# Patient Record
Sex: MALE | Race: WHITE | NOT HISPANIC OR LATINO | URBAN - METROPOLITAN AREA
[De-identification: names, ages, dates, MRNs, and addresses within clinical notes are randomized per-mention and may not be internally consistent; named-entity substitution may affect disease eponyms.]

---

## 2021-10-18 ENCOUNTER — OFFICE VISIT (OUTPATIENT)
Dept: INTERNAL MEDICINE CLINIC | Facility: CLINIC | Age: 56
End: 2021-10-18
Payer: COMMERCIAL

## 2021-10-18 VITALS
HEIGHT: 71 IN | WEIGHT: 174.8 LBS | RESPIRATION RATE: 18 BRPM | DIASTOLIC BLOOD PRESSURE: 102 MMHG | BODY MASS INDEX: 24.47 KG/M2 | HEART RATE: 53 BPM | SYSTOLIC BLOOD PRESSURE: 144 MMHG | OXYGEN SATURATION: 98 % | TEMPERATURE: 98.3 F

## 2021-10-18 DIAGNOSIS — I10 ACCELERATED HYPERTENSION: ICD-10-CM

## 2021-10-18 DIAGNOSIS — Z12.11 SCREENING FOR COLON CANCER: Primary | ICD-10-CM

## 2021-10-18 DIAGNOSIS — Z86.010 HX OF COLONOSCOPY WITH POLYPECTOMY: ICD-10-CM

## 2021-10-18 DIAGNOSIS — Z98.890 HX OF COLONOSCOPY WITH POLYPECTOMY: ICD-10-CM

## 2021-10-18 DIAGNOSIS — F17.200 SMOKING: ICD-10-CM

## 2021-10-18 PROBLEM — Z86.0100 HX OF COLONOSCOPY WITH POLYPECTOMY: Status: ACTIVE | Noted: 2021-10-18

## 2021-10-18 PROBLEM — IMO0001 SMOKING: Status: ACTIVE | Noted: 2021-10-18

## 2021-10-18 PROCEDURE — 99203 OFFICE O/P NEW LOW 30 MIN: CPT | Performed by: INTERNAL MEDICINE

## 2021-10-18 RX ORDER — AMLODIPINE AND VALSARTAN 5; 160 MG/1; MG/1
1 TABLET ORAL DAILY
Qty: 30 TABLET | Refills: 5 | Status: SHIPPED | OUTPATIENT
Start: 2021-10-18

## 2021-10-25 ENCOUNTER — OFFICE VISIT (OUTPATIENT)
Dept: INTERNAL MEDICINE CLINIC | Facility: CLINIC | Age: 56
End: 2021-10-25
Payer: COMMERCIAL

## 2021-10-25 VITALS
HEART RATE: 65 BPM | RESPIRATION RATE: 20 BRPM | BODY MASS INDEX: 24.25 KG/M2 | SYSTOLIC BLOOD PRESSURE: 130 MMHG | OXYGEN SATURATION: 96 % | WEIGHT: 173.2 LBS | HEIGHT: 71 IN | DIASTOLIC BLOOD PRESSURE: 88 MMHG | TEMPERATURE: 98.4 F

## 2021-10-25 DIAGNOSIS — I10 ESSENTIAL HYPERTENSION: Primary | ICD-10-CM

## 2021-10-25 PROCEDURE — 99213 OFFICE O/P EST LOW 20 MIN: CPT | Performed by: INTERNAL MEDICINE

## 2021-10-26 ENCOUNTER — TELEPHONE (OUTPATIENT)
Dept: ADMINISTRATIVE | Facility: OTHER | Age: 56
End: 2021-10-26

## 2022-10-27 ENCOUNTER — VBI (OUTPATIENT)
Dept: ADMINISTRATIVE | Facility: OTHER | Age: 57
End: 2022-10-27

## 2023-06-15 ENCOUNTER — OFFICE VISIT (OUTPATIENT)
Dept: INTERNAL MEDICINE CLINIC | Facility: OTHER | Age: 58
End: 2023-06-15
Payer: COMMERCIAL

## 2023-06-15 VITALS
BODY MASS INDEX: 23.8 KG/M2 | HEART RATE: 69 BPM | TEMPERATURE: 98.1 F | WEIGHT: 170 LBS | HEIGHT: 71 IN | SYSTOLIC BLOOD PRESSURE: 160 MMHG | DIASTOLIC BLOOD PRESSURE: 92 MMHG | OXYGEN SATURATION: 97 %

## 2023-06-15 DIAGNOSIS — I10 ESSENTIAL HYPERTENSION: ICD-10-CM

## 2023-06-15 DIAGNOSIS — Z13.29 SCREENING FOR THYROID DISORDER: ICD-10-CM

## 2023-06-15 DIAGNOSIS — Z13.220 SCREENING FOR LIPID DISORDERS: ICD-10-CM

## 2023-06-15 DIAGNOSIS — I10 ACCELERATED HYPERTENSION: ICD-10-CM

## 2023-06-15 DIAGNOSIS — J01.10 ACUTE NON-RECURRENT FRONTAL SINUSITIS: Primary | ICD-10-CM

## 2023-06-15 DIAGNOSIS — Z12.11 ENCOUNTER FOR COLORECTAL CANCER SCREENING: ICD-10-CM

## 2023-06-15 DIAGNOSIS — R31.0 GROSS HEMATURIA: ICD-10-CM

## 2023-06-15 DIAGNOSIS — Z12.12 ENCOUNTER FOR COLORECTAL CANCER SCREENING: ICD-10-CM

## 2023-06-15 PROCEDURE — 99214 OFFICE O/P EST MOD 30 MIN: CPT | Performed by: NURSE PRACTITIONER

## 2023-06-15 RX ORDER — AMOXICILLIN AND CLAVULANATE POTASSIUM 875; 125 MG/1; MG/1
1 TABLET, FILM COATED ORAL EVERY 12 HOURS SCHEDULED
Qty: 14 TABLET | Refills: 0 | Status: SHIPPED | OUTPATIENT
Start: 2023-06-15 | End: 2023-06-22 | Stop reason: SDUPTHER

## 2023-06-15 RX ORDER — AMLODIPINE AND VALSARTAN 5; 160 MG/1; MG/1
1 TABLET ORAL DAILY
Qty: 30 TABLET | Refills: 1 | Status: SHIPPED | OUTPATIENT
Start: 2023-06-15

## 2023-06-15 NOTE — PATIENT INSTRUCTIONS
Start augmentin twice daily for 7 days   Rest and hydration    Restart exforge for your blood pressure  Follow up with Dr ADEN in 1 month   Get blood done prior to visit     Schedule ultrasound of kidneys and bladder  Schedule follow up appointment with urology

## 2023-06-15 NOTE — LETTER
Dhara 15, 2023     Patient: Linda Mejia  YOB: 1965  Date of Visit: 6/15/2023      To Whom it May Concern:    Rutduran Ricopooja is under my professional care  Raymundo Mar was seen in my office on 6/15/2023  Raymundo Mar may return to work on 6/19/2023   Please excuse him 6/14, 6/15, 6/16, 6/17  If you have any questions or concerns, please don't hesitate to call           Sincerely,          EDUARDO Osborne        CC: No Recipients

## 2023-06-15 NOTE — ASSESSMENT & PLAN NOTE
- new onset gross hematuria intermittently since starting with URI    - DDX: IgA nephropathy, malignancy given his longstanding hx of smoking, stone, etc    - UA in ER showed 6-10 RBCs  - check ultrasound kidneys and bladder  - referral made to follow up with [facility] urology

## 2023-06-15 NOTE — ASSESSMENT & PLAN NOTE
- stopped his amlodipine-valsartan over a year ago  - restart amlodipine-valsartan 5-160mg daily  - follow up 1 month

## 2023-06-15 NOTE — PROGRESS NOTES
Assessment/Plan:    Problem List Items Addressed This Visit        Cardiovascular and Mediastinum    Essential hypertension     - stopped his amlodipine-valsartan over a year ago  - restart amlodipine-valsartan 5-160mg daily  - follow up 1 month         Relevant Medications    amLODIPine-valsartan (EXFORGE) 5-160 MG per tablet       Genitourinary    Gross hematuria     - new onset gross hematuria intermittently since starting with URI    - DDX: IgA nephropathy, malignancy given his longstanding hx of smoking, stone, etc    - UA in ER showed 6-10 RBCs  - check ultrasound kidneys and bladder  - referral made to follow up with [facility]           Relevant Orders    US kidney and bladder    Ambulatory Referral to Urology   Other Visit Diagnoses     Acute non-recurrent frontal sinusitis    -  Primary    - start augmentin bid x 7 days   - continue zyrtec  - work note given      Relevant Medications    amoxicillin-clavulanate (AUGMENTIN) 875-125 mg per tablet    Accelerated hypertension        Relevant Medications    amLODIPine-valsartan (EXFORGE) 5-160 MG per tablet    Other Relevant Orders    TSH, 3rd generation with Free T4 reflex    Encounter for colorectal cancer screening        Relevant Orders    Ambulatory Referral to Gastroenterology    Screening for lipid disorders        Relevant Orders    Lipid Panel with Direct LDL reflex    Screening for thyroid disorder        Relevant Orders    TSH, 3rd generation with Free T4 reflex          BMI Counseling: Body mass index is 24 05 kg/m²  M*Modal software was used to dictate this note  It may contain errors with dictating incorrect words or incorrect spelling  Please contact the provider directly with any questions  Subjective:      Patient ID: Rylie Zuñiga is a 59 y o  male  HPI    Patient presents today for ER follow up  He was last seen in our office in Oct 2021  He has since stopped his Exforge antihypertensive medication   BP is elevated today 160/92  In the ER his BP was 173/110  He has been off of his medication for 1-1 5 years     He was recently seen at [place] [address] ER on 6/11 for URI symptoms and hematuria  EKG in the ED showed sinus bradycardia, left anterior fascicular block  Minimal voltage criteria for LVH  Nonspecific T wave abnormality  CXR showed no acute cardiopulmonary abnormality   UA positive blood 6-10 RBC, otherwise normal  He was referred to urology   CBC and CMP unremarkable   Influenza, RSV and covid negative  He was recommended to use claritin or zyrtec for his symptoms    He URI symptoms started about 1 5 weeks ago  He complains of significant pressure in his head, sore throat,  Rhinorrhea  Minimal cough  He has been taking zyrtec which has been drying him up    Hematuria - he states he was taking a lot of ibuprofen and tylenol last week and then started noticing gross hematuria  He states if he didn't take aspirin, the symptom resolved  He denies any flank pain  No dysuria, frequency or urgency  He smokes 1/2-1 ppd  He has smoked for about 42 years  He states after taking ibuprofen yesterday he noticed his urine was brown tinged  The following portions of the patient's history were reviewed and updated as appropriate: allergies, current medications, past family history, past medical history, past social history, past surgical history and problem list     Review of Systems   HENT: Positive for congestion, ear pain, sinus pressure, sinus pain and sore throat  Respiratory: Positive for cough (mild)  Negative for chest tightness, shortness of breath and wheezing  Cardiovascular: Positive for chest pain (with cough)  Genitourinary: Positive for hematuria  Negative for difficulty urinating, dysuria and flank pain  Neurological: Positive for headaches           Past Medical History:   Diagnosis Date   • Colon polyps    • Hypertension          Current Outpatient Medications:   •  amLODIPine-valsartan (Ilya Sandoval) "5-160 MG per tablet, Take 1 tablet by mouth daily, Disp: 30 tablet, Rfl: 1  •  amoxicillin-clavulanate (AUGMENTIN) 875-125 mg per tablet, Take 1 tablet by mouth every 12 (twelve) hours for 7 days, Disp: 14 tablet, Rfl: 0    No Known Allergies    Social History   Past Surgical History:   Procedure Laterality Date   • COLONOSCOPY W/ BIOPSIES AND POLYPECTOMY       Family History   Family history unknown: Yes       Objective:  /92 (BP Location: Left arm, Patient Position: Sitting, Cuff Size: Standard)   Pulse 69   Temp 98 1 °F (36 7 °C) (Temporal)   Ht 5' 10 5\" (1 791 m)   Wt 77 1 kg (170 lb)   SpO2 97%   BMI 24 05 kg/m²      Physical Exam  Vitals reviewed  Constitutional:       General: He is not in acute distress  Appearance: He is normal weight  He is ill-appearing (mildly)  He is not diaphoretic  HENT:      Head: Normocephalic and atraumatic  Right Ear: Tympanic membrane and external ear normal       Left Ear: Tympanic membrane and external ear normal       Nose: Rhinorrhea present  Mouth/Throat:      Mouth: Mucous membranes are moist       Pharynx: Oropharynx is clear  Posterior oropharyngeal erythema (mild) present  No oropharyngeal exudate  Eyes:      Extraocular Movements: Extraocular movements intact  Conjunctiva/sclera: Conjunctivae normal       Pupils: Pupils are equal, round, and reactive to light  Cardiovascular:      Rate and Rhythm: Normal rate and regular rhythm  Heart sounds: Normal heart sounds  Pulmonary:      Effort: Pulmonary effort is normal  No respiratory distress  Breath sounds: Normal breath sounds  No wheezing, rhonchi or rales  Abdominal:      Tenderness: There is no right CVA tenderness or left CVA tenderness  Lymphadenopathy:      Cervical: No cervical adenopathy  Skin:     General: Skin is warm and dry  Neurological:      Mental Status: He is alert and oriented to person, place, and time  Mental status is at baseline   " Psychiatric:         Mood and Affect: Mood normal          Behavior: Behavior normal          Thought Content:  Thought content normal          Judgment: Judgment normal

## 2023-06-19 ENCOUNTER — TELEPHONE (OUTPATIENT)
Dept: INTERNAL MEDICINE CLINIC | Facility: OTHER | Age: 58
End: 2023-06-19

## 2023-06-19 NOTE — TELEPHONE ENCOUNTER
Patient calling because he was seen last week by Kash Jones  Patient was given off until the 17th  He is still not feeling better and was asking if we could update the note for him to be off today and tomorrow as well and he would go back Wednesday?

## 2023-06-19 NOTE — LETTER
June 19, 2023     Patient: Milan Saunders  YOB: 1965  Date of Visit: 6/19/2023      To Whom it May Concern:    Merari Talamantes is under my professional care  Ebonie Baltazar was seen in my office on 6/19/2023  Ebonie Baltazar may return to work on 6/21/2023   If you have any questions or concerns, please don't hesitate to call           Sincerely,        Jose Arenas MD

## 2023-06-22 ENCOUNTER — OFFICE VISIT (OUTPATIENT)
Dept: INTERNAL MEDICINE CLINIC | Facility: OTHER | Age: 58
End: 2023-06-22
Payer: COMMERCIAL

## 2023-06-22 VITALS
DIASTOLIC BLOOD PRESSURE: 98 MMHG | TEMPERATURE: 98.4 F | WEIGHT: 170.4 LBS | BODY MASS INDEX: 23.85 KG/M2 | HEART RATE: 73 BPM | HEIGHT: 71 IN | OXYGEN SATURATION: 97 % | RESPIRATION RATE: 18 BRPM | SYSTOLIC BLOOD PRESSURE: 142 MMHG

## 2023-06-22 DIAGNOSIS — I10 ESSENTIAL HYPERTENSION: Primary | ICD-10-CM

## 2023-06-22 DIAGNOSIS — J01.10 ACUTE NON-RECURRENT FRONTAL SINUSITIS: ICD-10-CM

## 2023-06-22 DIAGNOSIS — M62.838 NECK MUSCLE SPASM: ICD-10-CM

## 2023-06-22 PROCEDURE — 99214 OFFICE O/P EST MOD 30 MIN: CPT | Performed by: INTERNAL MEDICINE

## 2023-06-22 RX ORDER — VALSARTAN 160 MG/1
160 TABLET ORAL DAILY
Qty: 30 TABLET | Refills: 0 | Status: SHIPPED | OUTPATIENT
Start: 2023-06-22 | End: 2023-06-27

## 2023-06-22 RX ORDER — AMOXICILLIN AND CLAVULANATE POTASSIUM 875; 125 MG/1; MG/1
1 TABLET, FILM COATED ORAL EVERY 12 HOURS SCHEDULED
Qty: 6 TABLET | Refills: 0 | Status: SHIPPED | OUTPATIENT
Start: 2023-06-22 | End: 2023-06-25

## 2023-06-22 RX ORDER — BACLOFEN 10 MG/1
10 TABLET ORAL 2 TIMES DAILY PRN
Qty: 20 TABLET | Refills: 0 | Status: SHIPPED | OUTPATIENT
Start: 2023-06-22

## 2023-06-22 NOTE — PROGRESS NOTES
Assessment/Plan:    Essential hypertension  BP improved however remains elevated  Will add valsartan 160 mg daily to amlodipine-valsartan 5-160 mg daily  Reevaluate in 3 weeks  Neck muscle spasm  Will give baclofen PRN for spasms  Continue Tylenol as needed  Discussed range of motion and stretching exercises as well as massage therapy  Acute non-recurrent frontal sinusitis  We will treat with an additional 3 days of Augmentin twice daily  Continue Zyrtec  Diagnoses and all orders for this visit:    Essential hypertension  -     valsartan (DIOVAN) 160 mg tablet; Take 1 tablet (160 mg total) by mouth daily    Acute non-recurrent frontal sinusitis  Comments:  - start augmentin bid x 7 days   - continue zyrtec  - work note given    Orders:  -     amoxicillin-clavulanate (AUGMENTIN) 875-125 mg per tablet; Take 1 tablet by mouth every 12 (twelve) hours for 3 days    Neck muscle spasm  -     baclofen 10 mg tablet; Take 1 tablet (10 mg total) by mouth 2 (two) times a day as needed for muscle spasms                  Subjective:      Patient ID: Xavier Hoff is a 59 y o  male  Chief Complaint   Patient presents with   • Sinus Problem     Saw Hilda Goodman 6/15 for sinusitis symptoms  Still has sore throat and a pain behind the right side of his head that goes down behind the ear down his arm   • Cough     Bring up green mucus   • Hypertension     elevated   • Insomnia     So much pain he hasn't been sleeping for past 2 nights  Has been taking tylenol        59year old male is seen today with multiple concerns  He was treated with Augmentin for 7 days, last pill was last night  He continues to have a sore throat and a productive cough  He is also experiencing right sided neck pain radiates down his right arm  He has some muscle weakness in his   His BP is elevated  He has been compliant with his antihypertensives  Sinusitis  This is a new problem  The current episode started 1 to 4 weeks ago   The problem has been gradually improving since onset  There has been no fever  Associated symptoms include coughing and a sore throat  Pertinent negatives include no chills, congestion, diaphoresis, headaches, shortness of breath, sinus pressure or sneezing  Past treatments include nothing  Hypertension  This is a chronic problem  The current episode started more than 1 year ago  The problem is unchanged  The problem is uncontrolled  Pertinent negatives include no chest pain, headaches, palpitations or shortness of breath  Past treatments include angiotensin blockers and calcium channel blockers  The current treatment provides mild improvement  There are no compliance problems  The following portions of the patient's history were reviewed and updated as appropriate: allergies, current medications, past family history, past medical history, past social history, past surgical history and problem list     Review of Systems   Constitutional: Negative for activity change, appetite change, chills, diaphoresis, fatigue and fever  HENT: Positive for sore throat  Negative for congestion, postnasal drip, rhinorrhea, sinus pressure, sinus pain and sneezing  Eyes: Negative for visual disturbance  Respiratory: Positive for cough  Negative for apnea, choking, chest tightness, shortness of breath and wheezing  Cardiovascular: Negative for chest pain, palpitations and leg swelling  Gastrointestinal: Negative for abdominal distention, abdominal pain, anal bleeding, blood in stool, constipation, diarrhea, nausea and vomiting  Endocrine: Negative for cold intolerance and heat intolerance  Genitourinary: Negative for difficulty urinating, dysuria and hematuria  Musculoskeletal: Negative  Skin: Negative  Neurological: Negative for dizziness, weakness, light-headedness, numbness and headaches  Hematological: Negative for adenopathy     Psychiatric/Behavioral: Negative for agitation, sleep disturbance and "suicidal ideas  All other systems reviewed and are negative  Past Medical History:   Diagnosis Date   • Colon polyps    • Hypertension          Current Outpatient Medications:   •  amLODIPine-valsartan (EXFORGE) 5-160 MG per tablet, Take 1 tablet by mouth daily, Disp: 30 tablet, Rfl: 1  •  amoxicillin-clavulanate (AUGMENTIN) 875-125 mg per tablet, Take 1 tablet by mouth every 12 (twelve) hours for 3 days, Disp: 6 tablet, Rfl: 0  •  baclofen 10 mg tablet, Take 1 tablet (10 mg total) by mouth 2 (two) times a day as needed for muscle spasms, Disp: 20 tablet, Rfl: 0  •  valsartan (DIOVAN) 160 mg tablet, Take 1 tablet (160 mg total) by mouth daily, Disp: 30 tablet, Rfl: 0    No Known Allergies    Social History   Past Surgical History:   Procedure Laterality Date   • COLONOSCOPY W/ BIOPSIES AND POLYPECTOMY       Family History   Family history unknown: Yes       Objective:  /98 (BP Location: Left arm, Patient Position: Sitting, Cuff Size: Standard)   Pulse 73   Temp 98 4 °F (36 9 °C) (Temporal)   Resp 18   Ht 5' 10 5\" (1 791 m)   Wt 77 3 kg (170 lb 6 4 oz)   SpO2 97%   BMI 24 10 kg/m²     No results found for this or any previous visit (from the past 1344 hour(s))  Physical Exam  Vitals and nursing note reviewed  Constitutional:       General: He is not in acute distress  Appearance: He is well-developed  He is not diaphoretic  HENT:      Head: Normocephalic and atraumatic  Eyes:      General: No scleral icterus  Right eye: No discharge  Left eye: No discharge  Conjunctiva/sclera: Conjunctivae normal       Pupils: Pupils are equal, round, and reactive to light  Neck:      Thyroid: No thyromegaly  Vascular: No JVD  Cardiovascular:      Rate and Rhythm: Normal rate and regular rhythm  Heart sounds: Normal heart sounds  No murmur heard  No friction rub  No gallop  Pulmonary:      Effort: Pulmonary effort is normal  No respiratory distress        " Breath sounds: Normal breath sounds  No wheezing or rales  Chest:      Chest wall: No tenderness  Abdominal:      General: Bowel sounds are normal  There is no distension  Palpations: Abdomen is soft  There is no mass  Tenderness: There is no abdominal tenderness  There is no guarding or rebound  Musculoskeletal:         General: No tenderness or deformity  Normal range of motion  Cervical back: Normal range of motion and neck supple  Lymphadenopathy:      Cervical: No cervical adenopathy  Skin:     General: Skin is warm and dry  Coloration: Skin is not pale  Findings: No erythema or rash  Neurological:      Mental Status: He is alert and oriented to person, place, and time  Cranial Nerves: No cranial nerve deficit  Coordination: Coordination normal       Deep Tendon Reflexes: Reflexes are normal and symmetric  Psychiatric:         Behavior: Behavior normal          Thought Content:  Thought content normal          Judgment: Judgment normal

## 2023-06-22 NOTE — LETTER
June 22, 2023     Patient: Zakia Montes  YOB: 1965  Date of Visit: 6/22/2023      To Whom it May Concern:    Mook Ward is under my professional care  Denver Slider was seen in my office on 6/22/2023  Denver Slider may return to work on 6/27/2023   Please excuse him from work missed from 6/21 through 6/26/2023  If you have any questions or concerns, please don't hesitate to call           Sincerely,          Morris Heath MD        CC: No Recipients

## 2023-06-22 NOTE — ASSESSMENT & PLAN NOTE
Will give baclofen PRN for spasms  Continue Tylenol as needed  Discussed range of motion and stretching exercises as well as massage therapy

## 2023-06-22 NOTE — ASSESSMENT & PLAN NOTE
BP improved however remains elevated  Will add valsartan 160 mg daily to amlodipine-valsartan 5-160 mg daily  Reevaluate in 3 weeks

## 2023-06-23 ENCOUNTER — HOSPITAL ENCOUNTER (OUTPATIENT)
Dept: RADIOLOGY | Facility: IMAGING CENTER | Age: 58
End: 2023-06-23
Payer: COMMERCIAL

## 2023-06-23 ENCOUNTER — TELEPHONE (OUTPATIENT)
Dept: INTERNAL MEDICINE CLINIC | Age: 58
End: 2023-06-23

## 2023-06-23 DIAGNOSIS — R31.0 GROSS HEMATURIA: ICD-10-CM

## 2023-06-23 PROCEDURE — 76775 US EXAM ABDO BACK WALL LIM: CPT

## 2023-06-23 NOTE — TELEPHONE ENCOUNTER
Received prior authorization for medication:    valsartan (DIOVAN) 160 mg tablet       Scanning into media and sending to [employer]

## 2023-06-24 ENCOUNTER — APPOINTMENT (OUTPATIENT)
Dept: LAB | Facility: IMAGING CENTER | Age: 58
End: 2023-06-24
Payer: COMMERCIAL

## 2023-06-24 DIAGNOSIS — Z13.220 SCREENING FOR LIPID DISORDERS: ICD-10-CM

## 2023-06-24 DIAGNOSIS — Z13.29 SCREENING FOR THYROID DISORDER: ICD-10-CM

## 2023-06-24 DIAGNOSIS — I10 ACCELERATED HYPERTENSION: ICD-10-CM

## 2023-06-24 LAB
CHOLEST SERPL-MCNC: 161 MG/DL
HDLC SERPL-MCNC: 47 MG/DL
LDLC SERPL CALC-MCNC: 100 MG/DL (ref 0–100)
TRIGL SERPL-MCNC: 68 MG/DL
TSH SERPL DL<=0.05 MIU/L-ACNC: 2.32 UIU/ML (ref 0.45–4.5)

## 2023-06-24 PROCEDURE — 84443 ASSAY THYROID STIM HORMONE: CPT

## 2023-06-24 PROCEDURE — 80061 LIPID PANEL: CPT

## 2023-06-24 PROCEDURE — 36415 COLL VENOUS BLD VENIPUNCTURE: CPT

## 2023-06-27 ENCOUNTER — OFFICE VISIT (OUTPATIENT)
Dept: INTERNAL MEDICINE CLINIC | Facility: OTHER | Age: 58
End: 2023-06-27
Payer: COMMERCIAL

## 2023-06-27 VITALS
TEMPERATURE: 98.3 F | DIASTOLIC BLOOD PRESSURE: 88 MMHG | SYSTOLIC BLOOD PRESSURE: 130 MMHG | HEIGHT: 71 IN | BODY MASS INDEX: 23.52 KG/M2 | HEART RATE: 72 BPM | WEIGHT: 168 LBS | OXYGEN SATURATION: 98 %

## 2023-06-27 DIAGNOSIS — J01.00 ACUTE NON-RECURRENT MAXILLARY SINUSITIS: Primary | ICD-10-CM

## 2023-06-27 DIAGNOSIS — J01.10 ACUTE NON-RECURRENT FRONTAL SINUSITIS: ICD-10-CM

## 2023-06-27 PROCEDURE — 99213 OFFICE O/P EST LOW 20 MIN: CPT | Performed by: NURSE PRACTITIONER

## 2023-06-27 RX ORDER — PREDNISONE 20 MG/1
40 TABLET ORAL DAILY
Qty: 10 TABLET | Refills: 0 | Status: SHIPPED | OUTPATIENT
Start: 2023-06-27 | End: 2023-07-02

## 2023-06-27 NOTE — TELEPHONE ENCOUNTER
Please contact patient to inform that the additional dose of valsartan prescribed is not covered by his insurance  That being said, I see that his blood pressure during today's appointment was within normal range  Lets hold off on starting the additional valsartan  Thank you

## 2023-06-27 NOTE — LETTER
June 27, 2023     Patient: Carmita Del Toro  YOB: 1965  Date of Visit: 6/27/2023      To Whom it May Concern:    Telma Wilson is under my professional care  Leticia Wilhelmemmanuel was seen in my office on 6/27/2023  Leticia Cleaning may return to work on 6/29/23   If you have any questions or concerns, please don't hesitate to call           Sincerely,          EDUARDO Moreira        CC: No Recipients

## 2023-06-27 NOTE — ASSESSMENT & PLAN NOTE
Course of Augmentin, will start course of prednisone, continue with Zyrtec    Return to work note given

## 2023-06-27 NOTE — PROGRESS NOTES
Assessment/Plan:    Acute non-recurrent frontal sinusitis  Course of Augmentin, will start course of prednisone, continue with Zyrtec  Return to work note given              Diagnoses and all orders for this visit:    Acute non-recurrent maxillary sinusitis  -     predniSONE 20 mg tablet; Take 2 tablets (40 mg total) by mouth daily for 5 days    Acute non-recurrent frontal sinusitis          Subjective:      Patient ID: Angelia Alvarez is a 59 y o  male  Patient presents today with ongoing cold like symptoms  He was seen in our office on 6/15 and 6/22 for similar symptoms   Was treated with a ten day course of Augmentin     He reports ongoing right ear pain, head pain, and sore throat  He reports that he is having trouble swallowing food due to sore throat    He has been taking tylenol without relief  Was taking antihistamine     He reports that his sympotoms are slowly improving       The following portions of the patient's history were reviewed and updated as appropriate: allergies, current medications, past family history, past medical history, past social history, past surgical history and problem list     Review of Systems   Constitutional: Negative for chills and fever  HENT: Positive for ear pain, sinus pressure and sore throat  Eyes: Negative for pain and visual disturbance  Respiratory: Negative for cough and shortness of breath  Cardiovascular: Negative for chest pain and palpitations  Gastrointestinal: Negative for abdominal pain and vomiting  Genitourinary: Negative for dysuria and hematuria  Musculoskeletal: Negative for arthralgias and back pain  Skin: Negative for color change and rash  Neurological: Positive for headaches  Negative for seizures and syncope  All other systems reviewed and are negative          Past Medical History:   Diagnosis Date   • Colon polyps    • Hypertension          Current Outpatient Medications:   •  amLODIPine-valsartan (EXFORGE) 5-160 MG per "tablet, Take 1 tablet by mouth daily, Disp: 30 tablet, Rfl: 1  •  predniSONE 20 mg tablet, Take 2 tablets (40 mg total) by mouth daily for 5 days, Disp: 10 tablet, Rfl: 0  •  valsartan (DIOVAN) 160 mg tablet, Take 1 tablet (160 mg total) by mouth daily, Disp: 30 tablet, Rfl: 0  •  baclofen 10 mg tablet, Take 1 tablet (10 mg total) by mouth 2 (two) times a day as needed for muscle spasms (Patient not taking: Reported on 6/27/2023), Disp: 20 tablet, Rfl: 0    No Known Allergies    Social History   Past Surgical History:   Procedure Laterality Date   • COLONOSCOPY W/ BIOPSIES AND POLYPECTOMY       Family History   Family history unknown: Yes       Objective:  /88 (BP Location: Left arm, Patient Position: Sitting, Cuff Size: Standard)   Pulse 72   Temp 98 3 °F (36 8 °C) (Temporal)   Ht 5' 10 5\" (1 791 m)   Wt 76 2 kg (168 lb)   SpO2 98%   BMI 23 76 kg/m²     Recent Results (from the past 1344 hour(s))   Lipid Panel with Direct LDL reflex    Collection Time: 06/24/23  8:33 AM   Result Value Ref Range    Cholesterol 161 See Comment mg/dL    Triglycerides 68 See Comment mg/dL    HDL, Direct 47 >=40 mg/dL    LDL Calculated 100 0 - 100 mg/dL   TSH, 3rd generation with Free T4 reflex    Collection Time: 06/24/23  8:33 AM   Result Value Ref Range    TSH 3RD GENERATON 2 319 0 450 - 4 500 uIU/mL            Physical Exam  Constitutional:       General: He is not in acute distress  Appearance: He is well-developed  He is not diaphoretic  HENT:      Head: Normocephalic and atraumatic  Right Ear: External ear normal  A middle ear effusion is present  Tympanic membrane is not erythematous or bulging  Left Ear: External ear normal  A middle ear effusion is present  Tympanic membrane is not erythematous or bulging  Nose: Nose normal       Mouth/Throat:      Pharynx: Posterior oropharyngeal erythema present  No oropharyngeal exudate  Eyes:      General:         Right eye: No discharge           Left " eye: No discharge  Conjunctiva/sclera: Conjunctivae normal       Pupils: Pupils are equal, round, and reactive to light  Neck:      Thyroid: No thyromegaly  Cardiovascular:      Rate and Rhythm: Normal rate and regular rhythm  Heart sounds: Normal heart sounds  No murmur heard  No friction rub  No gallop  Pulmonary:      Effort: Pulmonary effort is normal  No respiratory distress  Breath sounds: Normal breath sounds  No stridor  No wheezing or rales  Abdominal:      General: Bowel sounds are normal  There is no distension  Palpations: Abdomen is soft  Tenderness: There is no abdominal tenderness  Musculoskeletal:      Cervical back: Normal range of motion and neck supple  Lymphadenopathy:      Cervical: No cervical adenopathy  Skin:     General: Skin is warm and dry  Findings: No erythema or rash  Neurological:      Mental Status: He is alert and oriented to person, place, and time  Psychiatric:         Behavior: Behavior normal          Thought Content:  Thought content normal          Judgment: Judgment normal

## 2023-07-07 DIAGNOSIS — N28.1 BILATERAL RENAL CYSTS: Primary | ICD-10-CM

## 2023-07-28 ENCOUNTER — VBI (OUTPATIENT)
Dept: ADMINISTRATIVE | Facility: OTHER | Age: 58
End: 2023-07-28

## 2023-08-12 DIAGNOSIS — I10 ACCELERATED HYPERTENSION: ICD-10-CM

## 2023-08-13 RX ORDER — AMLODIPINE AND VALSARTAN 5; 160 MG/1; MG/1
1 TABLET ORAL DAILY
Qty: 30 TABLET | Refills: 0 | OUTPATIENT
Start: 2023-08-13

## 2023-08-21 PROBLEM — J01.10 ACUTE NON-RECURRENT FRONTAL SINUSITIS: Status: RESOLVED | Noted: 2023-06-22 | Resolved: 2023-08-21

## 2023-12-11 ENCOUNTER — VBI (OUTPATIENT)
Dept: ADMINISTRATIVE | Facility: OTHER | Age: 58
End: 2023-12-11

## 2024-04-26 ENCOUNTER — TELEPHONE (OUTPATIENT)
Dept: INTERNAL MEDICINE CLINIC | Facility: OTHER | Age: 59
End: 2024-04-26

## 2024-09-19 ENCOUNTER — ANESTHESIA EVENT (EMERGENCY)
Dept: PERIOP | Facility: HOSPITAL | Age: 59
End: 2024-09-19
Payer: COMMERCIAL

## 2024-09-19 ENCOUNTER — APPOINTMENT (INPATIENT)
Dept: NON INVASIVE DIAGNOSTICS | Facility: HOSPITAL | Age: 59
DRG: 219 | End: 2024-09-19
Attending: THORACIC SURGERY (CARDIOTHORACIC VASCULAR SURGERY)
Payer: COMMERCIAL

## 2024-09-19 ENCOUNTER — APPOINTMENT (EMERGENCY)
Dept: RADIOLOGY | Facility: HOSPITAL | Age: 59
End: 2024-09-19
Payer: COMMERCIAL

## 2024-09-19 ENCOUNTER — ANESTHESIA (EMERGENCY)
Dept: PERIOP | Facility: HOSPITAL | Age: 59
End: 2024-09-19
Payer: COMMERCIAL

## 2024-09-19 ENCOUNTER — APPOINTMENT (EMERGENCY)
Dept: CT IMAGING | Facility: HOSPITAL | Age: 59
End: 2024-09-19
Payer: COMMERCIAL

## 2024-09-19 ENCOUNTER — HOSPITAL ENCOUNTER (EMERGENCY)
Facility: HOSPITAL | Age: 59
End: 2024-09-19
Attending: EMERGENCY MEDICINE
Payer: COMMERCIAL

## 2024-09-19 ENCOUNTER — HOSPITAL ENCOUNTER (INPATIENT)
Facility: HOSPITAL | Age: 59
LOS: 5 days | Discharge: HOME HEALTH | DRG: 219 | End: 2024-09-24
Attending: THORACIC SURGERY (CARDIOTHORACIC VASCULAR SURGERY) | Admitting: THORACIC SURGERY (CARDIOTHORACIC VASCULAR SURGERY)
Payer: COMMERCIAL

## 2024-09-19 VITALS
DIASTOLIC BLOOD PRESSURE: 79 MMHG | OXYGEN SATURATION: 98 % | BODY MASS INDEX: 25.2 KG/M2 | HEIGHT: 71 IN | RESPIRATION RATE: 20 BRPM | TEMPERATURE: 97.6 F | SYSTOLIC BLOOD PRESSURE: 131 MMHG | WEIGHT: 180 LBS | HEART RATE: 49 BPM

## 2024-09-19 DIAGNOSIS — R91.8 PULMONARY NODULES: ICD-10-CM

## 2024-09-19 DIAGNOSIS — I10 ESSENTIAL HYPERTENSION: ICD-10-CM

## 2024-09-19 DIAGNOSIS — R07.9 CHEST PAIN: ICD-10-CM

## 2024-09-19 DIAGNOSIS — I10 ACCELERATED HYPERTENSION: ICD-10-CM

## 2024-09-19 DIAGNOSIS — Z98.890 S/P AORTIC DISSECTION REPAIR: Primary | ICD-10-CM

## 2024-09-19 DIAGNOSIS — I71.00 AORTIC DISSECTION (HCC): Primary | ICD-10-CM

## 2024-09-19 PROBLEM — I71.010 TYPE 1 DISSECTION OF ASCENDING AORTA (HCC): Status: ACTIVE | Noted: 2024-09-19

## 2024-09-19 LAB
2HR DELTA HS TROPONIN: 0 NG/L
ABO GROUP BLD: NORMAL
ALBUMIN SERPL BCG-MCNC: 4.2 G/DL (ref 3.5–5)
ALP SERPL-CCNC: 50 U/L (ref 34–104)
ALT SERPL W P-5'-P-CCNC: 17 U/L (ref 7–52)
ANION GAP SERPL CALCULATED.3IONS-SCNC: 9 MMOL/L (ref 4–13)
AST SERPL W P-5'-P-CCNC: 18 U/L (ref 13–39)
BASE EXCESS BLDA CALC-SCNC: -1 MMOL/L (ref -2–3)
BASE EXCESS BLDA CALC-SCNC: -3 MMOL/L (ref -2–3)
BASE EXCESS BLDA CALC-SCNC: 1 MMOL/L (ref -2–3)
BASOPHILS # BLD AUTO: 0.05 THOUSANDS/ΜL (ref 0–0.1)
BASOPHILS NFR BLD AUTO: 0 % (ref 0–1)
BILIRUB SERPL-MCNC: 0.65 MG/DL (ref 0.2–1)
BLD GP AB SCN SERPL QL: NEGATIVE
BLD GP AB SCN SERPL QL: NEGATIVE
BNP SERPL-MCNC: 49 PG/ML (ref 0–100)
BUN SERPL-MCNC: 25 MG/DL (ref 5–25)
CA-I BLD-SCNC: 0.9 MMOL/L (ref 1.12–1.32)
CA-I BLD-SCNC: 1.04 MMOL/L (ref 1.12–1.32)
CA-I BLD-SCNC: 1.04 MMOL/L (ref 1.12–1.32)
CA-I BLD-SCNC: 1.12 MMOL/L (ref 1.12–1.32)
CA-I BLD-SCNC: 1.16 MMOL/L (ref 1.12–1.32)
CALCIUM SERPL-MCNC: 9.4 MG/DL (ref 8.4–10.2)
CARDIAC TROPONIN I PNL SERPL HS: 4 NG/L
CARDIAC TROPONIN I PNL SERPL HS: 4 NG/L
CHLORIDE SERPL-SCNC: 101 MMOL/L (ref 96–108)
CO2 SERPL-SCNC: 27 MMOL/L (ref 21–32)
CREAT SERPL-MCNC: 1.33 MG/DL (ref 0.6–1.3)
EOSINOPHIL # BLD AUTO: 0.47 THOUSAND/ΜL (ref 0–0.61)
EOSINOPHIL NFR BLD AUTO: 3 % (ref 0–6)
ERYTHROCYTE [DISTWIDTH] IN BLOOD BY AUTOMATED COUNT: 12.7 % (ref 11.6–15.1)
FLUAV RNA RESP QL NAA+PROBE: NEGATIVE
FLUBV RNA RESP QL NAA+PROBE: NEGATIVE
GFR SERPL CREATININE-BSD FRML MDRD: 57 ML/MIN/1.73SQ M
GLUCOSE SERPL-MCNC: 122 MG/DL (ref 65–140)
GLUCOSE SERPL-MCNC: 126 MG/DL (ref 65–140)
GLUCOSE SERPL-MCNC: 145 MG/DL (ref 65–140)
GLUCOSE SERPL-MCNC: 149 MG/DL (ref 65–140)
GLUCOSE SERPL-MCNC: 151 MG/DL (ref 65–140)
GLUCOSE SERPL-MCNC: 158 MG/DL (ref 65–140)
HCO3 BLDA-SCNC: 22.5 MMOL/L (ref 22–28)
HCO3 BLDA-SCNC: 25.1 MMOL/L (ref 22–28)
HCO3 BLDA-SCNC: 26.1 MMOL/L (ref 24–30)
HCO3 BLDA-SCNC: 27.5 MMOL/L (ref 22–28)
HCO3 BLDA-SCNC: 27.6 MMOL/L (ref 22–28)
HCT VFR BLD AUTO: 44.2 % (ref 36.5–49.3)
HCT VFR BLD CALC: 26 % (ref 36.5–49.3)
HCT VFR BLD CALC: 31 % (ref 36.5–49.3)
HCT VFR BLD CALC: 31 % (ref 36.5–49.3)
HCT VFR BLD CALC: 34 % (ref 36.5–49.3)
HCT VFR BLD CALC: 39 % (ref 36.5–49.3)
HGB BLD-MCNC: 14.4 G/DL (ref 12–17)
HGB BLDA-MCNC: 10.5 G/DL (ref 12–17)
HGB BLDA-MCNC: 10.5 G/DL (ref 12–17)
HGB BLDA-MCNC: 11.6 G/DL (ref 12–17)
HGB BLDA-MCNC: 13.3 G/DL (ref 12–17)
HGB BLDA-MCNC: 8.8 G/DL (ref 12–17)
IMM GRANULOCYTES # BLD AUTO: 0.05 THOUSAND/UL (ref 0–0.2)
IMM GRANULOCYTES NFR BLD AUTO: 0 % (ref 0–2)
KCT BLD-ACNC: 154 SEC (ref 89–137)
KCT BLD-ACNC: 402 SEC (ref 89–137)
KCT BLD-ACNC: 521 SEC (ref 89–137)
KCT BLD-ACNC: 653 SEC (ref 89–137)
KCT BLD-ACNC: >1000 SEC (ref 89–137)
LYMPHOCYTES # BLD AUTO: 1.92 THOUSANDS/ΜL (ref 0.6–4.47)
LYMPHOCYTES NFR BLD AUTO: 14 % (ref 14–44)
MCH RBC QN AUTO: 29 PG (ref 26.8–34.3)
MCHC RBC AUTO-ENTMCNC: 32.6 G/DL (ref 31.4–37.4)
MCV RBC AUTO: 89 FL (ref 82–98)
MONOCYTES # BLD AUTO: 1.23 THOUSAND/ΜL (ref 0.17–1.22)
MONOCYTES NFR BLD AUTO: 9 % (ref 4–12)
NEUTROPHILS # BLD AUTO: 10.17 THOUSANDS/ΜL (ref 1.85–7.62)
NEUTS SEG NFR BLD AUTO: 74 % (ref 43–75)
NRBC BLD AUTO-RTO: 0 /100 WBCS
PCO2 BLD: 23 MMOL/L (ref 21–32)
PCO2 BLD: 27 MMOL/L (ref 21–32)
PCO2 BLD: 28 MMOL/L (ref 21–32)
PCO2 BLD: 29 MMOL/L (ref 21–32)
PCO2 BLD: 29 MMOL/L (ref 21–32)
PCO2 BLD: 33.1 MM HG (ref 36–44)
PCO2 BLD: 51.5 MM HG (ref 36–44)
PCO2 BLD: 56.5 MM HG (ref 42–50)
PCO2 BLD: 59.2 MM HG (ref 36–44)
PCO2 BLD: 63.9 MM HG (ref 36–44)
PH BLD: 7.23 [PH] (ref 7.35–7.45)
PH BLD: 7.24 [PH] (ref 7.35–7.45)
PH BLD: 7.27 [PH] (ref 7.3–7.4)
PH BLD: 7.34 [PH] (ref 7.35–7.45)
PH BLD: 7.44 [PH] (ref 7.35–7.45)
PLATELET # BLD AUTO: 240 THOUSANDS/UL (ref 149–390)
PMV BLD AUTO: 8.2 FL (ref 8.9–12.7)
PO2 BLD: 350 MM HG (ref 75–129)
PO2 BLD: 351 MM HG (ref 75–129)
PO2 BLD: 80 MM HG (ref 35–45)
PO2 BLD: >400 MM HG (ref 75–129)
PO2 BLD: >400 MM HG (ref 75–129)
POTASSIUM BLD-SCNC: 3.5 MMOL/L (ref 3.5–5.3)
POTASSIUM BLD-SCNC: 4 MMOL/L (ref 3.5–5.3)
POTASSIUM BLD-SCNC: 4.2 MMOL/L (ref 3.5–5.3)
POTASSIUM BLD-SCNC: 4.2 MMOL/L (ref 3.5–5.3)
POTASSIUM BLD-SCNC: 4.3 MMOL/L (ref 3.5–5.3)
POTASSIUM SERPL-SCNC: 4 MMOL/L (ref 3.5–5.3)
PROT SERPL-MCNC: 7.5 G/DL (ref 6.4–8.4)
RBC # BLD AUTO: 4.97 MILLION/UL (ref 3.88–5.62)
RH BLD: POSITIVE
RSV RNA RESP QL NAA+PROBE: NEGATIVE
SAO2 % BLD FROM PO2: 100 % (ref 60–85)
SAO2 % BLD FROM PO2: 100 % (ref 60–85)
SAO2 % BLD FROM PO2: 94 % (ref 60–85)
SARS-COV-2 RNA RESP QL NAA+PROBE: NEGATIVE
SODIUM BLD-SCNC: 131 MMOL/L (ref 136–145)
SODIUM BLD-SCNC: 132 MMOL/L (ref 136–145)
SODIUM BLD-SCNC: 135 MMOL/L (ref 136–145)
SODIUM BLD-SCNC: 137 MMOL/L (ref 136–145)
SODIUM BLD-SCNC: 138 MMOL/L (ref 136–145)
SODIUM SERPL-SCNC: 137 MMOL/L (ref 135–147)
SPECIMEN EXPIRATION DATE: NORMAL
SPECIMEN EXPIRATION DATE: NORMAL
SPECIMEN SOURCE: ABNORMAL
WBC # BLD AUTO: 13.89 THOUSAND/UL (ref 4.31–10.16)

## 2024-09-19 PROCEDURE — 82947 ASSAY GLUCOSE BLOOD QUANT: CPT

## 2024-09-19 PROCEDURE — P9012 CRYOPRECIPITATE EACH UNIT: HCPCS

## 2024-09-19 PROCEDURE — P9017 PLASMA 1 DONOR FRZ W/IN 8 HR: HCPCS

## 2024-09-19 PROCEDURE — 02QF0ZZ REPAIR AORTIC VALVE, OPEN APPROACH: ICD-10-PCS | Performed by: THORACIC SURGERY (CARDIOTHORACIC VASCULAR SURGERY)

## 2024-09-19 PROCEDURE — 82330 ASSAY OF CALCIUM: CPT

## 2024-09-19 PROCEDURE — 85014 HEMATOCRIT: CPT

## 2024-09-19 PROCEDURE — 36415 COLL VENOUS BLD VENIPUNCTURE: CPT | Performed by: STUDENT IN AN ORGANIZED HEALTH CARE EDUCATION/TRAINING PROGRAM

## 2024-09-19 PROCEDURE — 86900 BLOOD TYPING SEROLOGIC ABO: CPT

## 2024-09-19 PROCEDURE — 33858 AS-AORT GRF F/AORTIC DSJ: CPT | Performed by: THORACIC SURGERY (CARDIOTHORACIC VASCULAR SURGERY)

## 2024-09-19 PROCEDURE — C1874 STENT, COATED/COV W/DEL SYS: HCPCS | Performed by: THORACIC SURGERY (CARDIOTHORACIC VASCULAR SURGERY)

## 2024-09-19 PROCEDURE — 30233R1 TRANSFUSION OF NONAUTOLOGOUS PLATELETS INTO PERIPHERAL VEIN, PERCUTANEOUS APPROACH: ICD-10-PCS | Performed by: ANESTHESIOLOGY

## 2024-09-19 PROCEDURE — C1768 GRAFT, VASCULAR: HCPCS | Performed by: THORACIC SURGERY (CARDIOTHORACIC VASCULAR SURGERY)

## 2024-09-19 PROCEDURE — 33881 EVASC RPR TA NDGFT XCOV LSA: CPT | Performed by: THORACIC SURGERY (CARDIOTHORACIC VASCULAR SURGERY)

## 2024-09-19 PROCEDURE — 83880 ASSAY OF NATRIURETIC PEPTIDE: CPT

## 2024-09-19 PROCEDURE — 34716 OPN AX/SUBCLA ART EXPOS CNDT: CPT | Performed by: THORACIC SURGERY (CARDIOTHORACIC VASCULAR SURGERY)

## 2024-09-19 PROCEDURE — 71045 X-RAY EXAM CHEST 1 VIEW: CPT

## 2024-09-19 PROCEDURE — 33858 AS-AORT GRF F/AORTIC DSJ: CPT | Performed by: PHYSICIAN ASSISTANT

## 2024-09-19 PROCEDURE — 33871 TRANSVRS A-ARCH GRF HYPTHRM: CPT | Performed by: PHYSICIAN ASSISTANT

## 2024-09-19 PROCEDURE — 93005 ELECTROCARDIOGRAM TRACING: CPT

## 2024-09-19 PROCEDURE — 02RW0KZ REPLACEMENT OF THORACIC AORTA, DESCENDING WITH NONAUTOLOGOUS TISSUE SUBSTITUTE, OPEN APPROACH: ICD-10-PCS | Performed by: THORACIC SURGERY (CARDIOTHORACIC VASCULAR SURGERY)

## 2024-09-19 PROCEDURE — 84484 ASSAY OF TROPONIN QUANT: CPT | Performed by: STUDENT IN AN ORGANIZED HEALTH CARE EDUCATION/TRAINING PROGRAM

## 2024-09-19 PROCEDURE — 80053 COMPREHEN METABOLIC PANEL: CPT | Performed by: STUDENT IN AN ORGANIZED HEALTH CARE EDUCATION/TRAINING PROGRAM

## 2024-09-19 PROCEDURE — 96375 TX/PRO/DX INJ NEW DRUG ADDON: CPT

## 2024-09-19 PROCEDURE — 86901 BLOOD TYPING SEROLOGIC RH(D): CPT | Performed by: PHYSICIAN ASSISTANT

## 2024-09-19 PROCEDURE — 99285 EMERGENCY DEPT VISIT HI MDM: CPT

## 2024-09-19 PROCEDURE — 85347 COAGULATION TIME ACTIVATED: CPT

## 2024-09-19 PROCEDURE — 74174 CTA ABD&PLVS W/CONTRAST: CPT

## 2024-09-19 PROCEDURE — 86923 COMPATIBILITY TEST ELECTRIC: CPT

## 2024-09-19 PROCEDURE — 30233L1 TRANSFUSION OF NONAUTOLOGOUS FRESH PLASMA INTO PERIPHERAL VEIN, PERCUTANEOUS APPROACH: ICD-10-PCS | Performed by: ANESTHESIOLOGY

## 2024-09-19 PROCEDURE — 33881 EVASC RPR TA NDGFT XCOV LSA: CPT | Performed by: PHYSICIAN ASSISTANT

## 2024-09-19 PROCEDURE — 99223 1ST HOSP IP/OBS HIGH 75: CPT | Performed by: THORACIC SURGERY (CARDIOTHORACIC VASCULAR SURGERY)

## 2024-09-19 PROCEDURE — 30233N0 TRANSFUSION OF AUTOLOGOUS RED BLOOD CELLS INTO PERIPHERAL VEIN, PERCUTANEOUS APPROACH: ICD-10-PCS | Performed by: THORACIC SURGERY (CARDIOTHORACIC VASCULAR SURGERY)

## 2024-09-19 PROCEDURE — 71275 CT ANGIOGRAPHY CHEST: CPT

## 2024-09-19 PROCEDURE — 33871 TRANSVRS A-ARCH GRF HYPTHRM: CPT | Performed by: THORACIC SURGERY (CARDIOTHORACIC VASCULAR SURGERY)

## 2024-09-19 PROCEDURE — 86901 BLOOD TYPING SEROLOGIC RH(D): CPT

## 2024-09-19 PROCEDURE — 82803 BLOOD GASES ANY COMBINATION: CPT

## 2024-09-19 PROCEDURE — A7041 WATER SEAL DRAIN CONTAINER: HCPCS | Performed by: THORACIC SURGERY (CARDIOTHORACIC VASCULAR SURGERY)

## 2024-09-19 PROCEDURE — 85025 COMPLETE CBC W/AUTO DIFF WBC: CPT | Performed by: STUDENT IN AN ORGANIZED HEALTH CARE EDUCATION/TRAINING PROGRAM

## 2024-09-19 PROCEDURE — 86850 RBC ANTIBODY SCREEN: CPT | Performed by: PHYSICIAN ASSISTANT

## 2024-09-19 PROCEDURE — 5A1221Z PERFORMANCE OF CARDIAC OUTPUT, CONTINUOUS: ICD-10-PCS | Performed by: ANESTHESIOLOGY

## 2024-09-19 PROCEDURE — C2628 CATHETER, OCCLUSION: HCPCS | Performed by: THORACIC SURGERY (CARDIOTHORACIC VASCULAR SURGERY)

## 2024-09-19 PROCEDURE — 84132 ASSAY OF SERUM POTASSIUM: CPT

## 2024-09-19 PROCEDURE — 34716 OPN AX/SUBCLA ART EXPOS CNDT: CPT | Performed by: PHYSICIAN ASSISTANT

## 2024-09-19 PROCEDURE — 86850 RBC ANTIBODY SCREEN: CPT

## 2024-09-19 PROCEDURE — 96366 THER/PROPH/DIAG IV INF ADDON: CPT

## 2024-09-19 PROCEDURE — 0241U HB NFCT DS VIR RESP RNA 4 TRGT: CPT

## 2024-09-19 PROCEDURE — 86900 BLOOD TYPING SEROLOGIC ABO: CPT | Performed by: PHYSICIAN ASSISTANT

## 2024-09-19 PROCEDURE — 96365 THER/PROPH/DIAG IV INF INIT: CPT

## 2024-09-19 PROCEDURE — P9037 PLATE PHERES LEUKOREDU IRRAD: HCPCS

## 2024-09-19 PROCEDURE — 84295 ASSAY OF SERUM SODIUM: CPT

## 2024-09-19 PROCEDURE — 93355 ECHO TRANSESOPHAGEAL (TEE): CPT

## 2024-09-19 DEVICE — GRAFT VASCULAR GELWEAVE STRAIGHT 8MM X 30CM: Type: IMPLANTABLE DEVICE | Site: AXILLA | Status: FUNCTIONAL

## 2024-09-19 DEVICE — BARD® PTFE FELT, 2.5 CM X 15.2 CM
Type: IMPLANTABLE DEVICE | Site: HEART | Status: FUNCTIONAL
Brand: BARD® PTFE FELT

## 2024-09-19 RX ORDER — SODIUM CHLORIDE 9 MG/ML
INJECTION, SOLUTION INTRAVENOUS CONTINUOUS PRN
Status: DISCONTINUED | OUTPATIENT
Start: 2024-09-19 | End: 2024-09-20

## 2024-09-19 RX ORDER — ASPIRIN 81 MG/1
324 TABLET, CHEWABLE ORAL ONCE
Status: COMPLETED | OUTPATIENT
Start: 2024-09-19 | End: 2024-09-19

## 2024-09-19 RX ORDER — CEFAZOLIN SODIUM 2 G/50ML
2000 SOLUTION INTRAVENOUS ONCE
Status: DISCONTINUED | OUTPATIENT
Start: 2024-09-19 | End: 2024-09-20 | Stop reason: HOSPADM

## 2024-09-19 RX ORDER — SODIUM CHLORIDE, SODIUM GLUCONATE, SODIUM ACETATE, POTASSIUM CHLORIDE, MAGNESIUM CHLORIDE, SODIUM PHOSPHATE, DIBASIC, AND POTASSIUM PHOSPHATE .53; .5; .37; .037; .03; .012; .00082 G/100ML; G/100ML; G/100ML; G/100ML; G/100ML; G/100ML; G/100ML
1000 INJECTION, SOLUTION INTRAVENOUS ONCE
Status: COMPLETED | OUTPATIENT
Start: 2024-09-19 | End: 2024-09-19

## 2024-09-19 RX ORDER — ROCURONIUM BROMIDE 10 MG/ML
INJECTION, SOLUTION INTRAVENOUS AS NEEDED
Status: DISCONTINUED | OUTPATIENT
Start: 2024-09-19 | End: 2024-09-20

## 2024-09-19 RX ORDER — POTASSIUM CHLORIDE 29.8 MG/ML
INJECTION INTRAVENOUS AS NEEDED
Status: DISCONTINUED | OUTPATIENT
Start: 2024-09-19 | End: 2024-09-20

## 2024-09-19 RX ORDER — MIDAZOLAM HYDROCHLORIDE 2 MG/2ML
INJECTION, SOLUTION INTRAMUSCULAR; INTRAVENOUS AS NEEDED
Status: DISCONTINUED | OUTPATIENT
Start: 2024-09-19 | End: 2024-09-20

## 2024-09-19 RX ORDER — ALBUMIN, HUMAN INJ 5% 5 %
SOLUTION INTRAVENOUS CONTINUOUS PRN
Status: DISCONTINUED | OUTPATIENT
Start: 2024-09-19 | End: 2024-09-20

## 2024-09-19 RX ORDER — HEPARIN SODIUM 1000 [USP'U]/ML
10000 INJECTION, SOLUTION INTRAVENOUS; SUBCUTANEOUS ONCE
Status: COMPLETED | OUTPATIENT
Start: 2024-09-19 | End: 2024-09-20

## 2024-09-19 RX ORDER — HEPARIN SODIUM 1000 [USP'U]/ML
400 INJECTION, SOLUTION INTRAVENOUS; SUBCUTANEOUS ONCE
Status: COMPLETED | OUTPATIENT
Start: 2024-09-19 | End: 2024-09-19

## 2024-09-19 RX ORDER — CEFAZOLIN SODIUM 1 G/3ML
INJECTION, POWDER, FOR SOLUTION INTRAMUSCULAR; INTRAVENOUS AS NEEDED
Status: DISCONTINUED | OUTPATIENT
Start: 2024-09-19 | End: 2024-09-20

## 2024-09-19 RX ORDER — MILRINONE LACTATE 0.2 MG/ML
INJECTION, SOLUTION INTRAVENOUS AS NEEDED
Status: DISCONTINUED | OUTPATIENT
Start: 2024-09-19 | End: 2024-09-20

## 2024-09-19 RX ORDER — AMINOCAPROIC ACID 250 MG/ML
INJECTION, SOLUTION INTRAVENOUS AS NEEDED
Status: DISCONTINUED | OUTPATIENT
Start: 2024-09-19 | End: 2024-09-20

## 2024-09-19 RX ORDER — SODIUM CHLORIDE, SODIUM GLUCONATE, SODIUM ACETATE, POTASSIUM CHLORIDE, MAGNESIUM CHLORIDE, SODIUM PHOSPHATE, DIBASIC, AND POTASSIUM PHOSPHATE .53; .5; .37; .037; .03; .012; .00082 G/100ML; G/100ML; G/100ML; G/100ML; G/100ML; G/100ML; G/100ML
INJECTION, SOLUTION INTRAVENOUS AS NEEDED
Status: DISCONTINUED | OUTPATIENT
Start: 2024-09-19 | End: 2024-09-20

## 2024-09-19 RX ORDER — FUROSEMIDE 10 MG/ML
INJECTION INTRAMUSCULAR; INTRAVENOUS AS NEEDED
Status: DISCONTINUED | OUTPATIENT
Start: 2024-09-19 | End: 2024-09-20

## 2024-09-19 RX ORDER — VANCOMYCIN HYDROCHLORIDE 1 G/20ML
INJECTION, POWDER, LYOPHILIZED, FOR SOLUTION INTRAVENOUS AS NEEDED
Status: DISCONTINUED | OUTPATIENT
Start: 2024-09-19 | End: 2024-09-20 | Stop reason: HOSPADM

## 2024-09-19 RX ORDER — EPHEDRINE SULFATE 50 MG/ML
INJECTION INTRAVENOUS AS NEEDED
Status: DISCONTINUED | OUTPATIENT
Start: 2024-09-19 | End: 2024-09-20

## 2024-09-19 RX ORDER — LIDOCAINE HYDROCHLORIDE 10 MG/ML
INJECTION, SOLUTION EPIDURAL; INFILTRATION; INTRACAUDAL; PERINEURAL
Status: COMPLETED | OUTPATIENT
Start: 2024-09-19 | End: 2024-09-19

## 2024-09-19 RX ORDER — FENTANYL CITRATE 50 UG/ML
INJECTION, SOLUTION INTRAMUSCULAR; INTRAVENOUS AS NEEDED
Status: DISCONTINUED | OUTPATIENT
Start: 2024-09-19 | End: 2024-09-20

## 2024-09-19 RX ORDER — SUCCINYLCHOLINE/SOD CL,ISO/PF 100 MG/5ML
SYRINGE (ML) INTRAVENOUS AS NEEDED
Status: DISCONTINUED | OUTPATIENT
Start: 2024-09-19 | End: 2024-09-20

## 2024-09-19 RX ORDER — MAGNESIUM HYDROXIDE 1200 MG/15ML
LIQUID ORAL AS NEEDED
Status: DISCONTINUED | OUTPATIENT
Start: 2024-09-19 | End: 2024-09-20 | Stop reason: HOSPADM

## 2024-09-19 RX ORDER — PROPOFOL 10 MG/ML
INJECTION, EMULSION INTRAVENOUS AS NEEDED
Status: DISCONTINUED | OUTPATIENT
Start: 2024-09-19 | End: 2024-09-20

## 2024-09-19 RX ORDER — SODIUM CHLORIDE, SODIUM LACTATE, POTASSIUM CHLORIDE, CALCIUM CHLORIDE 600; 310; 30; 20 MG/100ML; MG/100ML; MG/100ML; MG/100ML
INJECTION, SOLUTION INTRAVENOUS CONTINUOUS PRN
Status: DISCONTINUED | OUTPATIENT
Start: 2024-09-19 | End: 2024-09-20

## 2024-09-19 RX ORDER — FENTANYL CITRATE 50 UG/ML
50 INJECTION, SOLUTION INTRAMUSCULAR; INTRAVENOUS ONCE
Status: COMPLETED | OUTPATIENT
Start: 2024-09-19 | End: 2024-09-19

## 2024-09-19 RX ORDER — PHENYLEPHRINE HCL IN 0.9% NACL 1 MG/10 ML
200-2000 SYRINGE (ML) INTRAVENOUS ONCE
Status: DISCONTINUED | OUTPATIENT
Start: 2024-09-19 | End: 2024-09-20 | Stop reason: HOSPADM

## 2024-09-19 RX ORDER — MANNITOL 250 MG/ML
INJECTION, SOLUTION INTRAVENOUS AS NEEDED
Status: DISCONTINUED | OUTPATIENT
Start: 2024-09-19 | End: 2024-09-20

## 2024-09-19 RX ADMIN — HEPARIN SODIUM 10000 UNITS: 1000 INJECTION INTRAVENOUS; SUBCUTANEOUS at 22:02

## 2024-09-19 RX ADMIN — CEFAZOLIN 2000 MG: 1 INJECTION, POWDER, FOR SOLUTION INTRAMUSCULAR; INTRAVENOUS at 19:45

## 2024-09-19 RX ADMIN — MILRINONE LACTATE IN DEXTROSE 1 MG: 200 INJECTION, SOLUTION INTRAVENOUS at 21:35

## 2024-09-19 RX ADMIN — ROCURONIUM 50 MG: 50 INJECTION, SOLUTION INTRAVENOUS at 22:26

## 2024-09-19 RX ADMIN — PROPOFOL 100 MG: 10 INJECTION, EMULSION INTRAVENOUS at 22:26

## 2024-09-19 RX ADMIN — PHENYLEPHRINE HYDROCHLORIDE 50 MCG: 10 INJECTION INTRAVENOUS at 19:45

## 2024-09-19 RX ADMIN — HEPARIN SODIUM 10000 UNITS: 1000 INJECTION INTRAVENOUS; SUBCUTANEOUS at 19:57

## 2024-09-19 RX ADMIN — PROPOFOL 100 MG: 10 INJECTION, EMULSION INTRAVENOUS at 21:13

## 2024-09-19 RX ADMIN — HEPARIN SODIUM 22600 UNITS: 1000 INJECTION INTRAVENOUS; SUBCUTANEOUS at 21:35

## 2024-09-19 RX ADMIN — MIDAZOLAM 6 MG: 1 INJECTION INTRAMUSCULAR; INTRAVENOUS at 21:46

## 2024-09-19 RX ADMIN — ROCURONIUM 100 MG: 50 INJECTION, SOLUTION INTRAVENOUS at 19:19

## 2024-09-19 RX ADMIN — MILRINONE LACTATE IN DEXTROSE 1 MG: 200 INJECTION, SOLUTION INTRAVENOUS at 21:30

## 2024-09-19 RX ADMIN — ROCURONIUM 50 MG: 50 INJECTION, SOLUTION INTRAVENOUS at 21:46

## 2024-09-19 RX ADMIN — FUROSEMIDE 10 MG: 10 INJECTION, SOLUTION INTRAMUSCULAR; INTRAVENOUS at 22:23

## 2024-09-19 RX ADMIN — METHYLPREDNISOLONE SODIUM SUCCINATE 1000 MG: 1 INJECTION, POWDER, FOR SOLUTION INTRAMUSCULAR; INTRAVENOUS at 20:20

## 2024-09-19 RX ADMIN — MANNITOL 25 G: 12.5 INJECTION, SOLUTION INTRAVENOUS at 22:25

## 2024-09-19 RX ADMIN — PROPOFOL 200 MG: 10 INJECTION, EMULSION INTRAVENOUS at 19:16

## 2024-09-19 RX ADMIN — EPHEDRINE SULFATE 5 MG: 50 INJECTION, SOLUTION INTRAVENOUS at 21:47

## 2024-09-19 RX ADMIN — MANNITOL 25 G: 12.5 INJECTION, SOLUTION INTRAVENOUS at 19:58

## 2024-09-19 RX ADMIN — SODIUM BICARBONATE 50 MEQ: 84 INJECTION, SOLUTION INTRAVENOUS at 19:58

## 2024-09-19 RX ADMIN — FENTANYL CITRATE 500 MCG: 0.05 INJECTION, SOLUTION INTRAMUSCULAR; INTRAVENOUS at 20:34

## 2024-09-19 RX ADMIN — FENTANYL CITRATE 50 MCG: 50 INJECTION INTRAMUSCULAR; INTRAVENOUS at 17:07

## 2024-09-19 RX ADMIN — SODIUM CHLORIDE, SODIUM LACTATE, POTASSIUM CHLORIDE, AND CALCIUM CHLORIDE: .6; .31; .03; .02 INJECTION, SOLUTION INTRAVENOUS at 19:40

## 2024-09-19 RX ADMIN — PROPOFOL 100 MG: 10 INJECTION, EMULSION INTRAVENOUS at 22:15

## 2024-09-19 RX ADMIN — PHENYLEPHRINE HYDROCHLORIDE 50 MCG: 10 INJECTION INTRAVENOUS at 19:53

## 2024-09-19 RX ADMIN — SODIUM BICARBONATE 25 MEQ: 84 INJECTION, SOLUTION INTRAVENOUS at 23:53

## 2024-09-19 RX ADMIN — EPHEDRINE SULFATE 5 MG: 50 INJECTION, SOLUTION INTRAVENOUS at 19:53

## 2024-09-19 RX ADMIN — AMINOCAPROIC ACID 5 G: 250 INJECTION, SOLUTION INTRAVENOUS at 19:45

## 2024-09-19 RX ADMIN — LIDOCAINE HYDROCHLORIDE 0.5 ML: 10 INJECTION, SOLUTION EPIDURAL; INFILTRATION; INTRACAUDAL; PERINEURAL at 19:11

## 2024-09-19 RX ADMIN — POTASSIUM CHLORIDE 20 MEQ: 29.8 INJECTION, SOLUTION INTRAVENOUS at 22:32

## 2024-09-19 RX ADMIN — Medication 300 ML: at 22:30

## 2024-09-19 RX ADMIN — SODIUM CHLORIDE, SODIUM GLUCONATE, SODIUM ACETATE, POTASSIUM CHLORIDE, MAGNESIUM CHLORIDE, SODIUM PHOSPHATE, DIBASIC, AND POTASSIUM PHOSPHATE 1000 ML: .53; .5; .37; .037; .03; .012; .00082 INJECTION, SOLUTION INTRAVENOUS at 16:25

## 2024-09-19 RX ADMIN — PHENYLEPHRINE HYDROCHLORIDE 100 MCG: 10 INJECTION INTRAVENOUS at 19:48

## 2024-09-19 RX ADMIN — Medication 200 ML: at 22:35

## 2024-09-19 RX ADMIN — Medication 200 MG: at 19:16

## 2024-09-19 RX ADMIN — SODIUM CHLORIDE: 0.9 INJECTION, SOLUTION INTRAVENOUS at 19:19

## 2024-09-19 RX ADMIN — Medication 500 ML: at 22:34

## 2024-09-19 RX ADMIN — EPHEDRINE SULFATE 5 MG: 50 INJECTION, SOLUTION INTRAVENOUS at 19:56

## 2024-09-19 RX ADMIN — SODIUM BICARBONATE 25 MEQ: 84 INJECTION, SOLUTION INTRAVENOUS at 23:46

## 2024-09-19 RX ADMIN — PHENYLEPHRINE HYDROCHLORIDE 50 MCG: 10 INJECTION INTRAVENOUS at 19:41

## 2024-09-19 RX ADMIN — FENTANYL CITRATE 500 MCG: 0.05 INJECTION, SOLUTION INTRAMUSCULAR; INTRAVENOUS at 19:16

## 2024-09-19 RX ADMIN — POTASSIUM CHLORIDE 20 MEQ: 29.8 INJECTION, SOLUTION INTRAVENOUS at 23:54

## 2024-09-19 RX ADMIN — MILRINONE LACTATE IN DEXTROSE 0.25 MCG/KG/MIN: 200 INJECTION, SOLUTION INTRAVENOUS at 21:46

## 2024-09-19 RX ADMIN — PROPOFOL 100 MG: 10 INJECTION, EMULSION INTRAVENOUS at 22:11

## 2024-09-19 RX ADMIN — SODIUM BICARBONATE 25 MEQ: 84 INJECTION, SOLUTION INTRAVENOUS at 22:36

## 2024-09-19 RX ADMIN — PROPOFOL 100 MG: 10 INJECTION, EMULSION INTRAVENOUS at 22:09

## 2024-09-19 RX ADMIN — AMINOCAPROIC ACID 2 G/HR: 250 INJECTION, SOLUTION INTRAVENOUS at 19:45

## 2024-09-19 RX ADMIN — ALBUMIN (HUMAN): 12.5 INJECTION, SOLUTION INTRAVENOUS at 19:48

## 2024-09-19 RX ADMIN — Medication 250 ML: at 23:41

## 2024-09-19 RX ADMIN — PROPOFOL 100 MG: 10 INJECTION, EMULSION INTRAVENOUS at 22:38

## 2024-09-19 RX ADMIN — Medication 1 EACH: at 19:58

## 2024-09-19 RX ADMIN — MIDAZOLAM 4 MG: 1 INJECTION INTRAMUSCULAR; INTRAVENOUS at 19:16

## 2024-09-19 RX ADMIN — SODIUM CHLORIDE, SODIUM GLUCONATE, SODIUM ACETATE, POTASSIUM CHLORIDE, MAGNESIUM CHLORIDE, SODIUM PHOSPHATE, DIBASIC, AND POTASSIUM PHOSPHATE 500 ML: .53; .5; .37; .037; .03; .012; .00082 INJECTION, SOLUTION INTRAVENOUS at 19:58

## 2024-09-19 RX ADMIN — HEPARIN SODIUM 10000 UNITS: 1000 INJECTION INTRAVENOUS; SUBCUTANEOUS at 20:38

## 2024-09-19 RX ADMIN — CEFAZOLIN 2000 MG: 1 INJECTION, POWDER, FOR SOLUTION INTRAMUSCULAR; INTRAVENOUS at 23:40

## 2024-09-19 RX ADMIN — IOHEXOL 100 ML: 350 INJECTION, SOLUTION INTRAVENOUS at 16:55

## 2024-09-19 RX ADMIN — PROPOFOL 100 MG: 10 INJECTION, EMULSION INTRAVENOUS at 22:13

## 2024-09-19 RX ADMIN — ASPIRIN 81 MG 324 MG: 81 TABLET ORAL at 16:25

## 2024-09-19 RX ADMIN — Medication 250 ML: at 23:39

## 2024-09-19 RX ADMIN — PHENYLEPHRINE HYDROCHLORIDE 100 MCG: 10 INJECTION INTRAVENOUS at 21:47

## 2024-09-19 RX ADMIN — SODIUM CHLORIDE: 9 INJECTION, SOLUTION INTRAVENOUS at 19:39

## 2024-09-19 NOTE — ED PROCEDURE NOTE
PROCEDURE  CriticalCare Time    Date/Time: 9/19/2024 5:00 PM    Performed by: Greg Montiel III, DO  Authorized by: Greg Montiel III, DO    Critical care provider statement:     Critical care time (minutes):  45    Critical care start time:  9/19/2024 5:00 PM    Critical care end time:  9/19/2024 6:02 PM    Critical care time was exclusive of:  Separately billable procedures and treating other patients    Critical care was necessary to treat or prevent imminent or life-threatening deterioration of the following conditions:  Cardiac failure, circulatory failure, CNS failure or compromise and respiratory failure    Critical care was time spent personally by me on the following activities:  Obtaining history from patient or surrogate, development of treatment plan with patient or surrogate, discussions with consultants, evaluation of patient's response to treatment, review of old charts, examination of patient, re-evaluation of patient's condition, ordering and review of radiographic studies, ordering and review of laboratory studies and ordering and performing treatments and interventions       Greg Montiel III, DO  09/19/24 1801

## 2024-09-19 NOTE — EMTALA/ACUTE CARE TRANSFER
[facility] EMERGENCY DEPARTMENT  [address] [place] [address]  Dept: [phone]      EMTALA TRANSFER CONSENT    NAME Santos CARDOSO 1965                              MRN [id number]    I have been informed of my rights regarding examination, treatment, and transfer   by Dr. Greg Montiel I*    Benefits: Specialized equipment and/or services available at the receiving facility (Include comment)________________________, Continuity of care, Other benefits (Include comment)_______________________ (CT surgery)    Risks: Potential for delay in receiving treatment      Consent for Transfer:  I acknowledge that my medical condition has been evaluated and explained to me by the emergency department physician or other qualified medical person and/or my attending physician, who has recommended that I be transferred to the service of  Accepting Physician: Dr. Rio Menchaca MD at Accepting Facility Name, City & State : [facility]. The above potential benefits of such transfer, the potential risks associated with such transfer, and the probable risks of not being transferred have been explained to me, and I fully understand them.  The doctor has explained that, in my case, the benefits of transfer outweigh the risks.  I agree to be transferred.    I authorize the performance of emergency medical procedures and treatments upon me in both transit and upon arrival at the receiving facility.  Additionally, I authorize the release of any and all medical records to the receiving facility and request they be transported with me, if possible.  I understand that the safest mode of transportation during a medical emergency is an ambulance and that the Hospital advocates the use of this mode of transport. Risks of traveling to the receiving facility by car, including absence of medical control, life sustaining equipment, such as oxygen, and medical personnel  has been explained to me and I fully understand them.    (ZAK CORRECT BOX BELOW)  [  ]  I consent to the stated transfer and to be transported by ambulance/helicopter.  [  ]  I consent to the stated transfer, but refuse transportation by ambulance and accept full responsibility for my transportation by car.  I understand the risks of non-ambulance transfers and I exonerate the Hospital and its staff from any deterioration in my condition that results from this refusal.    X___________________________________________    DATE  24  TIME________  Signature of patient or legally responsible individual signing on patient behalf           RELATIONSHIP TO PATIENT_________________________          Provider Certification    NAME Santos Sandoval                                         1965                              MRN [id number]    A medical screening exam was performed on the above named patient.  Based on the examination:    Condition Necessitating Transfer The encounter diagnosis was Aortic dissection (HCC).    Patient Condition: The patient has been stabilized such that within reasonable medical probability, no material deterioration of the patient condition or the condition of the unborn child(sergey) is likely to result from the transfer, An emergency transfer is being made prior to stabilization due to the need for definitive care and the benefit of transfer outweighs the risk    Reason for Transfer: Level of Care needed not available at this facility    Transfer Requirements: Facility [facility]   Space available and qualified personnel available for treatment as acknowledged by    Agreed to accept transfer and to provide appropriate medical treatment as acknowledged by       Dr. Rio Menchaca MD  Appropriate medical records of the examination and treatment of the patient are provided at the time of transfer   STAFF INITIAL WHEN COMPLETED _______  Transfer will be performed by qualified personnel from    and  appropriate transfer equipment as required, including the use of necessary and appropriate life support measures.    Provider Certification: I have examined the patient and explained the following risks and benefits of being transferred/refusing transfer to the patient/family:  General risk, such as traffic hazards, adverse weather conditions, rough terrain or turbulence, possible failure of equipment (including vehicle or aircraft), or consequences of actions of persons outside the control of the transport personnel, Unanticipated needs of medical equipment and personnel during transport, Risk of worsening condition      Based on these reasonable risks and benefits to the patient and/or the unborn child(sergey), and based upon the information available at the time of the patient’s examination, I certify that the medical benefits reasonably to be expected from the provision of appropriate medical treatments at another medical facility outweigh the increasing risks, if any, to the individual’s medical condition, and in the case of labor to the unborn child, from effecting the transfer.    X____________________________________________ DATE 09/19/24        TIME_______      ORIGINAL - SEND TO MEDICAL RECORDS   COPY - SEND WITH PATIENT DURING TRANSFER

## 2024-09-19 NOTE — ED ATTENDING ATTESTATION
9/19/2024  I, Greg Montiel III, DO, saw and evaluated the patient. I have discussed the patient with the resident/non-physician practitioner and agree with the resident's/non-physician practitioner's findings, Plan of Care, and MDM as documented in the resident's/non-physician practitioner's note, except where noted. All available labs and Radiology studies were reviewed.  I was present for key portions of any procedure(s) performed by the resident/non-physician practitioner and I was immediately available to provide assistance.       At this point I agree with the current assessment done in the Emergency Department.  I have conducted an independent evaluation of this patient a history and physical is as follows:    ED Course  ED Course as of 09/19/24 1801   Thu Sep 19, 2024   1700 Patient seen and evaluated after emergent review of the CT imaging: appears to be Type A aortic dissection w/ extension into the illiacs, pt has significant EKG changes, with BP: 119 / 60, priority one stat transfer requested.   1719 Received communication from attending radiologist: Dr. Stephanie Mcguire,   Hi, I'm reading the CTA on Mr. Sandoval. I'm still working on the report but agree with your note -definitely type A dissection extending throughout entire aorta, into both common iliac arteries and into left external iliac artery. Looks like it's probably involving the aortic root. No mediastinal hematoma.         Critical Care Time  Procedures

## 2024-09-19 NOTE — ED ATTENDING ATTESTATION
9/19/2024  I, Greg Montiel III, DO, saw and evaluated the patient. I have discussed the patient with the resident/non-physician practitioner and agree with the resident's/non-physician practitioner's findings, Plan of Care, and MDM as documented in the resident's/non-physician practitioner's note, except where noted. All available labs and Radiology studies were reviewed.  I was present for key portions of any procedure(s) performed by the resident/non-physician practitioner and I was immediately available to provide assistance.       At this point I agree with the current assessment done in the Emergency Department.  I have conducted an independent evaluation of this patient a history and physical is as follows:    ED Course  ED Course as of 09/19/24 1856   Thu Sep 19, 2024   1700 Patient seen and evaluated after emergent review of the CT imaging: appears to be Type A aortic dissection w/ extension into the illiacs, pt has significant EKG changes, with BP: 119 / 60, priority one stat transfer requested.   1719 Received communication from attending radiologist: Dr. Stephanie Mcguire,   Hi, I'm reading the CTA on Mr. Sandoval. I'm still working on the report but agree with your note -definitely type A dissection extending throughout entire aorta, into both common iliac arteries and into left external iliac artery. Looks like it's probably involving the aortic root. No mediastinal hematoma.         Critical Care Time  Procedures

## 2024-09-19 NOTE — ED PROVIDER NOTES
1. Aortic dissection (HCC)    2. Chest pain    3. Pulmonary nodules      ED Disposition       ED Disposition   Transfer to Another Facility-In Network    Condition   --    Date/Time   Thu Sep 19, 2024  5:16 PM    Comment   Santos Sandoval should be transferred out to [facility].               Assessment & Plan       Medical Decision Making  Patient is a somewhat ill-appearing 59-year-old male presenting with chest pain radiating down into his abdomen and upper legs that started suddenly around 2 PM. He was also short of breath prior to arrival. His chest pain is much better here in the ED. No prior EKG for comparison and he has T wave inversions today.  Brief focused differential diagnosis: ACS, aortic dissection, PE  Will get cardiac workup including EKG, troponin, and chest x-ray to evaluate for acute cardiopulmonary disease and ACS. Will check baseline labs. BNP to evaluate for HF. Dissection study to evaluate for acute aortic dissection. UA to rule out UTI or hematuria.  See ED course for interpretation of labs, imaging, and further medical decision making.   Aspirin for his chest pain which was initially mild. IV fluids for hydration. Later gave fentanyl for worsening chest pain. Patient hemodynamically stable in the emergency department.  CT tech pulled me aside as he finished up the scans and had me review them, revealing a type A aortic dissection. PACS was notified for dissection alert and priority one transfer was arranged. Spoke to Dr. Bernarda MD who accepted the patient for transfer directly to [facility] OR.   Dispo: Patient transferred to [facility] operating room by [facility] crew.    Amount and/or Complexity of Data Reviewed  External Data Reviewed: labs, radiology and notes.  Labs: ordered. Decision-making details documented in ED Course.  Radiology: ordered. Decision-making details documented in ED Course.  ECG/medicine tests: ordered and independent interpretation performed.    Risk  OTC  drugs.  Prescription drug management.        HEART Risk Score      Flowsheet Row Most Recent Value   Heart Score Risk Calculator    History 1 Filed at: 09/19/2024 1631   ECG 1 Filed at: 09/19/2024 1631   Age 1 Filed at: 09/19/2024 1631   Risk Factors 2 Filed at: 09/19/2024 1631   Troponin 1 Filed at: 09/19/2024 1631   HEART Score 6 Filed at: 09/19/2024 1631               ED Course as of 09/19/24 1858   Thu Sep 19, 2024   1608 Blood Pressure: 119/60  Vital signs reviewed and WNL.   1618 WBC(!): 13.89  Leukocytosis. No anemia. Not meeting SIRS criteria given bradycardia.   1620 Absolute Neutrophils(!): 10.17   1628 Comprehensive metabolic panel(!)  Electrolytes WNL. Cr up from prior. No glucose abnormality. No transaminitis.   1638 hs TnI 0hr: 4  Will repeat at 2 hours.   1656 Spoke to PACS calling dissection alert. Called radiology for stat read.   1706 Spoke to PACs on the phone. Awaiting call from CT surgery.   1725 Patient much more comfortable after the fentanyl.   1727 Spoke to Dr Rio Menchaca MD on the phone with PACs and he agrees type A dissection. Priority 1 transfer to [facility]. He will be going straight to the OR.   1749 CTA dissection protocol chest/abdomen/pelvis  IMPRESSION:  1) Type A dissection involving the entire aorta, and extending to the level of the distal right common iliac artery and the distal left external iliac artery.  2) Proximally the dissection extends from at least the level of the sinoatrial junction, however likely also involves the aortic root, although evaluation of this region is limited by motion artifact. No mediastinal hematoma.  3) Complex configuration of the dissection flap with several entry and reentry tears. Both true and false lumens are opacified. SMA, KARL and bilateral renal arteries arise from what is presumed to be the false lumen. All major aortic branches remain   patent.  4) Diffusely ectatic thoracic and abdominal aorta, with ascending aorta measuring up to 4.3 x  4.0 cm.  5) No intramural hematoma.  6) No other acute thoracic, abdominal, or pelvic pathology.  7) 3 nonspecific pulmonary nodules measuring up to 3 mm. Based on current Fleischner Society 2017 Guidelines on incidental pulmonary nodules, no routine follow-up is needed if the patient is low risk for lung cancer.  If the patient is high risk,   optional follow-up chest CT in 12 months can be considered.  8) Additional incidental findings as above.   1815 Report given to [employer] crew at the bedside.       Medications   multi-electrolyte (ISOLYTE-S PH 7.4) bolus 1,000 mL (1,000 mL Intravenous New Bag 9/19/24 1625)   aspirin chewable tablet 324 mg (324 mg Oral Given 9/19/24 1625)   iohexol (OMNIPAQUE) 350 MG/ML injection (MULTI-DOSE) 100 mL (100 mL Intravenous Given 9/19/24 1655)   fentaNYL injection 50 mcg (50 mcg Intravenous Given 9/19/24 1707)       History of Present Illness       Patient is a 59-year-old male with relevant past medical history of hypertension and viral URI presenting with chest pain, shortness of breath, and diaphoresis x 2 hours. He is prescribed a medication for hypertension but is not taking it. He had sudden onset mid to left-sided chest pain around 2 PM when he was getting out of the shower and it radiated into his upper stomach. He felt like he pulled a muscle in his chest and he described as chest heaviness. The pain is much better upon arrival now rating it a 3/10. He did not take anything for the pain. The pain now radiates into his b/l hips. He denies arm pain or jaw pain and has never had a heart attack. He was short of breath prior to arrival. He is a 40-pack-year smoker. 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 He denies fever, chills, headache, dizziness, numbness, tingling, back pain, abdominal pain, nausea, vomiting, or urinary symptoms.      History provided by:  Patient   used: No    Chest Pain  Associated symptoms: fatigue, nausea and weakness     Associated symptoms: no abdominal pain, no back pain, no cough, no dizziness, no fever, no headache, no palpitations, no shortness of breath and not vomiting        Review of Systems   Constitutional:  Positive for fatigue. Negative for chills and fever.   HENT:  Negative for congestion, ear pain, rhinorrhea and sore throat.    Eyes:  Negative for pain and visual disturbance.   Respiratory:  Negative for cough and shortness of breath.    Cardiovascular:  Positive for chest pain. Negative for palpitations.   Gastrointestinal:  Positive for nausea. Negative for abdominal pain, constipation, diarrhea and vomiting.   Genitourinary:  Negative for dysuria, frequency, hematuria and urgency.   Musculoskeletal:  Negative for arthralgias and back pain.   Skin:  Negative for color change and rash.   Neurological:  Positive for weakness. Negative for dizziness, seizures, syncope, light-headedness and headaches.   Psychiatric/Behavioral:  Negative for agitation and confusion.            Objective     ED Triage Vitals   Temperature Pulse Blood Pressure Respirations SpO2 Patient Position - Orthostatic VS   09/19/24 1608 09/19/24 1606 09/19/24 1607 09/19/24 1606 09/19/24 1606 09/19/24 1606   97.6 °F (36.4 °C) 98 119/60 20 96 % Sitting      Temp Source Heart Rate Source BP Location FiO2 (%) Pain Score    09/19/24 1608 09/19/24 1606 09/19/24 1606 -- 09/19/24 1707    Tympanic Monitor Left arm  9        Physical Exam  Vitals and nursing note reviewed.   Constitutional:       General: He is not in acute distress.     Appearance: He is well-developed. He is not ill-appearing.   HENT:      Head: Normocephalic and atraumatic.   Eyes:      Conjunctiva/sclera: Conjunctivae normal.   Cardiovascular:      Rate and Rhythm: Regular rhythm. Bradycardia present.      Heart sounds: No murmur heard.  Pulmonary:      Effort: Pulmonary effort is normal. No respiratory distress.      Breath sounds: Normal breath sounds. No wheezing, rhonchi or  rales.   Abdominal:      Palpations: Abdomen is soft.      Tenderness: There is no abdominal tenderness. There is no guarding or rebound.   Musculoskeletal:         General: No swelling.      Cervical back: Neck supple.   Skin:     General: Skin is warm and dry.      Capillary Refill: Capillary refill takes less than 2 seconds.   Neurological:      General: No focal deficit present.      Mental Status: He is alert and oriented to person, place, and time.      GCS: GCS eye subscore is 4. GCS verbal subscore is 5. GCS motor subscore is 6.      Cranial Nerves: Cranial nerves 2-12 are intact.      Sensory: Sensation is intact.      Motor: Motor function is intact.      Coordination: Coordination is intact.      Gait: Gait is intact.   Psychiatric:         Mood and Affect: Mood normal.         Labs Reviewed   CBC AND DIFFERENTIAL - Abnormal       Result Value    WBC 13.89 (*)     RBC 4.97      Hemoglobin 14.4      Hematocrit 44.2      MCV 89      MCH 29.0      MCHC 32.6      RDW 12.7      MPV 8.2 (*)     Platelets 240      nRBC 0      Segmented % 74      Immature Grans % 0      Lymphocytes % 14      Monocytes % 9      Eosinophils Relative 3      Basophils Relative 0      Absolute Neutrophils 10.17 (*)     Absolute Immature Grans 0.05      Absolute Lymphocytes 1.92      Absolute Monocytes 1.23 (*)     Eosinophils Absolute 0.47      Basophils Absolute 0.05     COMPREHENSIVE METABOLIC PANEL - Abnormal    Sodium 137      Potassium 4.0      Chloride 101      CO2 27      ANION GAP 9      BUN 25      Creatinine 1.33 (*)     Glucose 122      Calcium 9.4      AST 18      ALT 17      Alkaline Phosphatase 50      Total Protein 7.5      Albumin 4.2      Total Bilirubin 0.65      eGFR 57      Narrative:     National Kidney Disease Foundation guidelines for Chronic Kidney Disease (CKD):     Stage 1 with normal or high GFR (GFR > 90 mL/min/1.73 square meters)    Stage 2 Mild CKD (GFR = 60-89 mL/min/1.73 square meters)    Stage 3A  Moderate CKD (GFR = 45-59 mL/min/1.73 square meters)    Stage 3B Moderate CKD (GFR = 30-44 mL/min/1.73 square meters)    Stage 4 Severe CKD (GFR = 15-29 mL/min/1.73 square meters)    Stage 5 End Stage CKD (GFR <15 mL/min/1.73 square meters)  Note: GFR calculation is accurate only with a steady state creatinine   COVID19, INFLUENZA A/B, RSV PCR, [facility] - Normal    SARS-CoV-2 Negative      INFLUENZA A PCR Negative      INFLUENZA B PCR Negative      RSV PCR Negative      Narrative:     This test has been performed using the CoV-2/Flu/RSV plus assay on the [employer] GeneXpert platform. This test has been validated by the  and verified by the performing laboratory.     This test is designed to amplify and detect the following: nucleocapsid (N), envelope (E), and RNA-dependent RNA polymerase (RdRP) genes of the SARS-CoV-2 genome; matrix (M), basic polymerase (PB2), and acidic protein (PA) segments of the influenza A genome; matrix (M) and non-structural protein (NS) segments of the influenza B genome, and the nucleocapsid genes of RSV A and RSV B.     Positive results are indicative of the presence of Flu A, Flu B, RSV, and/or SARS-CoV-2 RNA. Positive results for SARS-CoV-2 or suspected novel influenza should be reported to state, local, or federal health departments according to local reporting requirements.      All results should be assessed in conjunction with clinical presentation and other laboratory markers for clinical management.     FOR PEDIATRIC PATIENTS - copy/paste COVID Guidelines URL to browser: [email or web address]      HS TROPONIN I 0HR - Normal    hs TnI 0hr 4     B-TYPE NATRIURETIC PEPTIDE (BNP) - Normal    BNP 49     HS TROPONIN I 2HR - Normal    hs TnI 2hr 4      Delta 2hr hsTnI 0     URINALYSIS WITH REFLEX TO SCOPE   HS TROPONIN I 4HR   TYPE AND SCREEN    ABO Grouping O      Rh Factor Positive      Antibody Screen Negative      Specimen  Expiration Date 20240922     ABORH RECHECK    ABO Grouping O      Rh Factor Positive       CTA dissection protocol chest/abdomen/pelvis   Final Interpretation by Stephanie Mcguire MD (09/19 1742)      1) Type A dissection involving the entire aorta, and extending to the level of the distal right common iliac artery and the distal left external iliac artery.      2) Proximally the dissection extends from at least the level of the sinoatrial junction, however likely also involves the aortic root, although evaluation of this region is limited by motion artifact. No mediastinal hematoma.      3) Complex configuration of the dissection flap with several entry and reentry tears. Both true and false lumens are opacified. SMA, KARL and bilateral renal arteries arise from what is presumed to be the false lumen. All major aortic branches remain    patent.      4) Diffusely ectatic thoracic and abdominal aorta, with ascending aorta measuring up to 4.3 x 4.0 cm.      5) No intramural hematoma.      6) No other acute thoracic, abdominal, or pelvic pathology.      7) 3 nonspecific pulmonary nodules measuring up to 3 mm. Based on current Fleischner Society 2017 Guidelines on incidental pulmonary nodules, no routine follow-up is needed if the patient is low risk for lung cancer.  If the patient is high risk,    optional follow-up chest CT in 12 months can be considered.      8) Additional incidental findings as above.      The urgent findings of this study were discussed with  and JOE Caraballo via HIPAA compliant secure electronic messaging on 9/19/2024 at 5:18 PM with confirmation of receipt.               Workstation performed: [id number]         XR chest 1 view portable   Final Interpretation by Cristóbal Forte MD (09/19 1624)      No acute cardiopulmonary disease.            Workstation performed: [id number]             ECG 12 Lead Documentation Only    Date/Time: 9/19/2024 4:08 PM    Performed by: Cristino Caraballo,  DAVID  Authorized by: Cristino Caraballo PA-C    Indications / Diagnosis:  Chest pain  ECG reviewed by me, the ED Provider: yes    Patient location:  ED  Previous ECG:     Comparison to cardiac monitor: Yes    Interpretation:     Interpretation: abnormal    Rate:     ECG rate:  48    ECG rate assessment: bradycardic    Rhythm:     Rhythm: sinus bradycardia    Ectopy:     Ectopy: PVCs      PVCs:  Infrequent  QRS:     QRS axis:  Left    QRS intervals:  Normal  Conduction:     Conduction: normal    ST segments:     ST segments:  Abnormal    Elevation:  II and III  T waves:     T waves: inverted      Inverted:  V6, V5, V4, V3, II, III and aVF  CriticalCare Time    Date/Time: 9/19/2024 4:45 PM    Performed by: Cristino Caraballo PA-C  Authorized by: Cristino Caraballo PA-C    Critical care provider statement:     Critical care time (minutes):  60    Critical care time was exclusive of:  Separately billable procedures and treating other patients and teaching time    Critical care was necessary to treat or prevent imminent or life-threatening deterioration of the following conditions:  Cardiac failure    Critical care was time spent personally by me on the following activities:  Blood draw for specimens, obtaining history from patient or surrogate, development of treatment plan with patient or surrogate, discussions with consultants, discussions with primary provider, examination of patient, interpretation of cardiac output measurements, ordering and review of laboratory studies, ordering and performing treatments and interventions, ordering and review of radiographic studies, re-evaluation of patient's condition and review of old charts    I assumed direction of critical care for this patient from another provider in my specialty: no    Comments:      Type A aortic dissection.      ED Medication and Procedure Management   Prior to Admission Medications   Prescriptions Last Dose Informant Patient Reported? Taking?    amLODIPine-valsartan (EXFORGE) 5-160 MG per tablet  Self No No   Sig: Take 1 tablet by mouth daily   baclofen 10 mg tablet   No No   Sig: Take 1 tablet (10 mg total) by mouth 2 (two) times a day as needed for muscle spasms   Patient not taking: Reported on 6/27/2023      Facility-Administered Medications: None     Discharge Medication List as of 9/19/2024  6:39 PM        CONTINUE these medications which have NOT CHANGED    Details   amLODIPine-valsartan (EXFORGE) 5-160 MG per tablet Take 1 tablet by mouth daily, Starting u 6/15/2023, Normal      baclofen 10 mg tablet Take 1 tablet (10 mg total) by mouth 2 (two) times a day as needed for muscle spasms, Starting u 6/22/2023, Normal           No discharge procedures on file.     Cristino Caraballo PA-C  09/19/24 1147

## 2024-09-19 NOTE — H&P
Cardiac Surgery   History and Physical  Santos Sandoval 59 y.o. male MRN: [id number]  Unit/Bed#: OR [place] Encounter: [id number]    Attending Physician: Rio Menchaca M.D.    Admitting Diagnosis: Type A Dissection    History of Present Illness: Santos Sandoval is a 59 y.o. year old male who presents with Type A Dissection. He has a PMH HTN, colon polyps, tobacco abuse who presented to [facility] ED with severe chest pain radiating down into his abdomen and legs. The pain started suddenly this afternoon when he was getting out of the shower. He felt like he pulled a muscle. The pain was associated with diaphoresis and SOB. He has not been taking his BP meds. He is a 40 pack year smoker. CTA was obtained which showed Type A Dissectoin. The patient was emergently transferred to the OR at [facility] for emergent Type A Dissection repair.    His sister is his main contact. Current smoker 1/2 ppd, some ETOH use,and occasional marijuana use. NKDA           Past Medical History:  Past Medical History:   Diagnosis Date    Colon polyps     Hypertension          Past Surgical History:   Past Surgical History:   Procedure Laterality Date    COLONOSCOPY W/ BIOPSIES AND POLYPECTOMY           Family History:  Family History   Family history unknown: Yes         Social History:  Social History     Substance and Sexual Activity   Alcohol Use Yes    Alcohol/week: 3.0 standard drinks of alcohol    Types: 3 Cans of beer per week    Comment: weekly     Social History     Substance and Sexual Activity   Drug Use Yes    Types: Marijuana     Social History     Tobacco Use   Smoking Status Every Day    Current packs/day: 0.50    Average packs/day: 0.5 packs/day for 45.7 years (22.9 ttl pk-yrs)    Types: Cigarettes    Start date: 1979   Smokeless Tobacco Never   Tobacco Comments    daily     Marital Status: Unknown      Home Medications:   Prior to Admission medications    Medication Sig Start Date End Date Taking? Authorizing Provider    amLODIPine-valsartan (EXFORGE) 5-160 MG per tablet Take 1 tablet by mouth daily 6/15/23   EDUARDO Mondragon   baclofen 10 mg tablet Take 1 tablet (10 mg total) by mouth 2 (two) times a day as needed for muscle spasms  Patient not taking: Reported on 6/27/2023 6/22/23   Sheldon Moreira MD       Allergies:  No Known Allergies    Review of Systems:  Review of Systems   Constitutional:  Negative for chills and fever.   Respiratory:  Negative for cough and shortness of breath.    Cardiovascular:  Positive for chest pain. Negative for palpitations.   Gastrointestinal:  Positive for abdominal pain. Negative for vomiting.   Genitourinary:  Negative for dysuria and hematuria.   Musculoskeletal:  Negative for arthralgias and back pain.   Skin:  Negative for color change and rash.   Neurological:  Negative for seizures and syncope.   All other systems reviewed and are negative.      Vital Signs:     There were no vitals filed for this visit.  Invasive Devices       Peripheral Intravenous Line  Duration             Peripheral IV 09/19/24 Left Antecubital <1 day    Peripheral IV 09/19/24 Right Antecubital <1 day                    Physical Exam:  Physical Exam  Vitals reviewed.   Constitutional:       General: He is not in acute distress.     Appearance: Normal appearance. He is normal weight.   HENT:      Head: Normocephalic and atraumatic.      Mouth/Throat:      Dentition: Abnormal dentition.   Eyes:      Extraocular Movements: Extraocular movements intact.      Conjunctiva/sclera: Conjunctivae normal.      Pupils: Pupils are equal, round, and reactive to light.   Cardiovascular:      Rate and Rhythm: Normal rate and regular rhythm.      Pulses:           Dorsalis pedis pulses are 2+ on the right side and 2+ on the left side.        Posterior tibial pulses are 2+ on the right side and 2+ on the left side.      Heart sounds: Normal heart sounds. No murmur heard.  Pulmonary:      Effort: Pulmonary effort is normal.       "Breath sounds: Normal breath sounds.   Abdominal:      General: Abdomen is flat. Bowel sounds are normal.      Palpations: Abdomen is soft.   Musculoskeletal:         General: No swelling. Normal range of motion.      Right lower leg: No edema.      Left lower leg: No edema.   Skin:     General: Skin is warm and dry.   Neurological:      General: No focal deficit present.      Mental Status: He is alert and oriented to person, place, and time.   Psychiatric:         Mood and Affect: Mood normal.         Behavior: Behavior normal.         Lab Results:     Results from last 7 days   Lab Units 09/19/24  1607   WBC Thousand/uL 13.89*   HEMOGLOBIN g/dL 14.4   HEMATOCRIT % 44.2   PLATELETS Thousands/uL 240     Results from last 7 days   Lab Units 09/19/24  1607   POTASSIUM mmol/L 4.0   CHLORIDE mmol/L 101   CO2 mmol/L 27   BUN mg/dL 25   CREATININE mg/dL 1.33*   CALCIUM mg/dL 9.4         No results found for: \"HGBA1C\"  No results found for: \"CKTOTAL\", \"CKMB\", \"CKMBINDEX\", \"TROPONINI\"    Imaging Studies:     CTA Dissection Protocol:  FINDINGS:     VASCULAR: There is fusiform ectasia of the thoracic aorta with the ascending aorta measuring 4.3 x 4.0 cm (at the level of the right pulmonary artery). The descending aorta measures 3.4 x 3.2 cm. The abdominal aorta is also ectatic, measuring up to 2.6 x   2.5 cm.     There is an intimomedial dissection flap throughout the entire aorta and extending into both common iliac arteries and the left external iliac artery. In the ascending aorta and the flap extends at least from the level of the sinotubular junction,   however likely also involves the aortic root although evaluation of this region is limited by motion artifact. The dissection is complex with several entry and reentry tears. Both the true and false lumen are opacified.     No evidence of intramural hematoma. No evidence of mediastinal hematoma.     The great vessels off the aortic arch are patent. The left vertebral " artery arises directly from the aortic arch between the left common carotid and subclavian arteries. This represents a normal anatomic variant.     The celiac axis is supplied by both the true and false lumen. The SMA, the bilateral renal arteries and the inferior mesenteric artery appear to be supplied by the false lumen. The celiac axis, superior mesenteric artery, inferior mesenteric artery and   bilateral renal arteries remain patent.     As mentioned above, the dissection flap extends into the bilateral common iliac arteries. On the left it also extends into to the level of the distal left external iliac artery. The bilateral common, internal and external iliac arteries are normal in   caliber.     Mild atherosclerotic calcifications in the abdominal aorta and the bilateral iliac vessels.     CHEST     BRONCHOPULMONARY:  Clear central airways. Mild paraseptal emphysematous changes in the right apex. There is a catheter in the lumen in the left upper lobe.     There is a noncalcified solid 3 mm right lower lobe nodule (series 4 image 139). There are also 2 micronodules in the left lower lobe (series 4 image 141 and 178).     PLEURA:  No pleural effusions. No pneumothorax.     HEART: The heart is normal in size. No pericardial effusion. Minimal coronary artery calcifications.     MEDIASTINUM/LYMPH NODES:  No axillary, mediastinal or hilar lymphadenopathy.     CHEST WALL AND LOWER NECK:  Unremarkable.     ABDOMEN     LIVER/BILIARY TREE:  Normal liver morphology. The arterial phase appearance of the liver is within normal limits. No focal hepatic lesions. No biliary ductal dilation.     GALLBLADDER:  No calcified gallstones. No pericholecystic inflammatory change.     SPLEEN: Unremarkable arterial phase appearance of the spleen.     PANCREAS:  Unremarkable. Normal caliber main pancreatic duct.     ADRENAL GLANDS:  Unremarkable.     KIDNEYS/URETERS:  Symmetric renal enhancement. No intrarenal or ureteral calculi. No  hydronephrosis. Several simple cysts in both kidneys. One of the cysts in the right kidney appears to contain a thin septation. There are also several subcentimeter   hypodense lesions in the kidneys, too small to accurately characterize with CT technique, however statistically most likely are additional cysts.     STOMACH AND BOWEL:  Evaluation of the gastrointestinal tract is somewhat limited by underdistention and lack of oral contrast.  No bowel obstruction or convincing inflammation.     APPENDIX: Normal appendix.     ABDOMINOPELVIC CAVITY:  No ascites or pneumoperitoneum.     LYMPH NODES: No abdominal or pelvic lymphadenopathy.     PELVIS     REPRODUCTIVE ORGANS:  The prostate gland is not enlarged.     URINARY BLADDER: The urinary bladder is under distended, limiting its evaluation.     ABDOMINAL WALL/INGUINAL REGIONS: No ventral abdominal wall hernia.     MUSCULOSKELETAL:  No focal aggressive osseous lesions.     IMPRESSION:     1) Type A dissection involving the entire aorta, and extending to the level of the distal right common iliac artery and the distal left external iliac artery.     2) Proximally the dissection extends from at least the level of the sinoatrial junction, however likely also involves the aortic root, although evaluation of this region is limited by motion artifact. No mediastinal hematoma.     3) Complex configuration of the dissection flap with several entry and reentry tears. Both true and false lumens are opacified. SMA, KARL and bilateral renal arteries arise from what is presumed to be the false lumen. All major aortic branches remain   patent.     4) Diffusely ectatic thoracic and abdominal aorta, with ascending aorta measuring up to 4.3 x 4.0 cm.     5) No intramural hematoma.     6) No other acute thoracic, abdominal, or pelvic pathology.     7) 3 nonspecific pulmonary nodules measuring up to 3 mm. Based on current Fleischner Society 2017 Guidelines on incidental pulmonary  nodules, no routine follow-up is needed if the patient is low risk for lung cancer.  If the patient is high risk,   optional follow-up chest CT in 12 months can be considered.     8) Additional incidental findings as above.     The urgent findings of this study were discussed with  and JOE Caraballo via HIPAA compliant secure electronic messaging on 9/19/2024 at 5:18 PM with confirmation of receipt.    CTA personally reviewed by me revealing Type A dissection. Confirmed by radiologist      Assessment:  Active Problems:  There are no active Hospital Problems.    Type A Dissection    Plan:  Santos Sandoval is admitted today for treatment of Type A Dissection.    Straight to OR for emergent surgery    Santos Sandoval was comfortable with our recommendations, and their questions were answered to their satisfaction.  We will continue to evaluate the patient daily with further recommendations as work up is completed.  Thank you for allowing us to participate in the care of this patient.     SIGNATURE: Lora Murdock PA-C  DATE: September 19, 2024  TIME: 7:07 PM    * This note was completed in part utilizing [employer] direct voice recognition software.   Grammatical errors, random word insertion, spelling mistakes, and incomplete sentences may be an occasional consequence of the system secondary to software limitations, ambient noise and hardware issues. At the time of dictation, efforts were made to edit, clarify and /or correct errors. Please read the chart carefully and recognize, using context, where substitutions have occurred.  If you have any questions or concerns about the context, text or information contained within the body of this dictation, please contact myself, the provider, for further clarification.

## 2024-09-19 NOTE — ANESTHESIA PREPROCEDURE EVALUATION
Procedure:  BENTALL PROCEDURE (ASCENDING AORTIC REPAIR) (Chest)    Relevant Problems   CARDIO   (+) Essential hypertension   (+) Type 1 dissection of ascending aorta (HCC)      MUSCULOSKELETAL   (+) Neck muscle spasm      PULMONARY   (+) Smoking        Physical Exam    Airway    Mallampati score: I  TM Distance: >3 FB  Neck ROM: full     Dental       Cardiovascular      Pulmonary      Other Findings  Missing many, none loose      Anesthesia Plan  ASA Score- 4 Emergent    Anesthesia Type- general with ASA Monitors.         Additional Monitors: central venous line and pulmonary artery catheter.    Airway Plan: ETT.    Comment: Emergent  Standard ASA mon, migdalia/CVP/PAC/MAURILIO  Post-op ICU/vent  Prn blood products.       Plan Factors-    Chart reviewed.   Existing labs reviewed.                   Induction- intravenous.    Postoperative Plan-         Informed Consent- Anesthetic plan and risks discussed with patient.

## 2024-09-20 ENCOUNTER — APPOINTMENT (INPATIENT)
Dept: RADIOLOGY | Facility: HOSPITAL | Age: 59
DRG: 219 | End: 2024-09-20
Payer: COMMERCIAL

## 2024-09-20 PROBLEM — D62 ACUTE BLOOD LOSS AS CAUSE OF POSTOPERATIVE ANEMIA: Status: ACTIVE | Noted: 2024-09-20

## 2024-09-20 LAB
ABO GROUP BLD BPU: NORMAL
ABO GROUP BLD: NORMAL
ANION GAP SERPL CALCULATED.3IONS-SCNC: 7 MMOL/L (ref 4–13)
ANION GAP SERPL CALCULATED.3IONS-SCNC: 9 MMOL/L (ref 4–13)
APTT PPP: 31 SECONDS (ref 23–34)
APTT PPP: 35 SECONDS (ref 23–34)
ATRIAL RATE: 48 BPM
ATRIAL RATE: 74 BPM
BASE EXCESS BLDA CALC-SCNC: -1 MMOL/L (ref -2–3)
BASE EXCESS BLDA CALC-SCNC: -2 MMOL/L (ref -2–3)
BASE EXCESS BLDA CALC-SCNC: -4 MMOL/L (ref -2–3)
BASE EXCESS BLDA CALC-SCNC: 0 MMOL/L (ref -2–3)
BASE EXCESS BLDA CALC-SCNC: 4 MMOL/L (ref -2–3)
BLD GP AB SCN SERPL QL: NEGATIVE
BPU ID: NORMAL
BUN SERPL-MCNC: 14 MG/DL (ref 5–25)
BUN SERPL-MCNC: 20 MG/DL (ref 5–25)
CA-I BLD-SCNC: 0.87 MMOL/L (ref 1.12–1.32)
CA-I BLD-SCNC: 0.89 MMOL/L (ref 1.12–1.32)
CA-I BLD-SCNC: 0.9 MMOL/L (ref 1.12–1.32)
CA-I BLD-SCNC: 0.93 MMOL/L (ref 1.12–1.32)
CA-I BLD-SCNC: 1.01 MMOL/L (ref 1.12–1.32)
CA-I BLD-SCNC: 1.1 MMOL/L (ref 1.12–1.32)
CA-I BLD-SCNC: 1.13 MMOL/L (ref 1.12–1.32)
CA-I BLD-SCNC: 1.16 MMOL/L (ref 1.12–1.32)
CA-I BLD-SCNC: 1.24 MMOL/L (ref 1.12–1.32)
CALCIUM SERPL-MCNC: 7.5 MG/DL (ref 8.4–10.2)
CALCIUM SERPL-MCNC: 8.6 MG/DL (ref 8.4–10.2)
CHLORIDE SERPL-SCNC: 103 MMOL/L (ref 96–108)
CHLORIDE SERPL-SCNC: 109 MMOL/L (ref 96–108)
CHOLEST SERPL-MCNC: 127 MG/DL
CO2 SERPL-SCNC: 27 MMOL/L (ref 21–32)
CO2 SERPL-SCNC: 27 MMOL/L (ref 21–32)
CREAT SERPL-MCNC: 0.85 MG/DL (ref 0.6–1.3)
CREAT SERPL-MCNC: 1.22 MG/DL (ref 0.6–1.3)
CROSSMATCH: NORMAL
ERYTHROCYTE [DISTWIDTH] IN BLOOD BY AUTOMATED COUNT: 13.2 % (ref 11.6–15.1)
ERYTHROCYTE [DISTWIDTH] IN BLOOD BY AUTOMATED COUNT: 13.2 % (ref 11.6–15.1)
EST. AVERAGE GLUCOSE BLD GHB EST-MCNC: 126 MG/DL
FIBRINOGEN PPP-MCNC: 278 MG/DL (ref 206–523)
FIBRINOGEN PPP-MCNC: 415 MG/DL (ref 206–523)
GFR SERPL CREATININE-BSD FRML MDRD: 63 ML/MIN/1.73SQ M
GFR SERPL CREATININE-BSD FRML MDRD: 94 ML/MIN/1.73SQ M
GLUCOSE SERPL-MCNC: 120 MG/DL (ref 65–140)
GLUCOSE SERPL-MCNC: 124 MG/DL (ref 65–140)
GLUCOSE SERPL-MCNC: 126 MG/DL (ref 65–140)
GLUCOSE SERPL-MCNC: 127 MG/DL (ref 65–140)
GLUCOSE SERPL-MCNC: 128 MG/DL (ref 65–140)
GLUCOSE SERPL-MCNC: 132 MG/DL (ref 65–140)
GLUCOSE SERPL-MCNC: 141 MG/DL (ref 65–140)
GLUCOSE SERPL-MCNC: 157 MG/DL (ref 65–140)
GLUCOSE SERPL-MCNC: 166 MG/DL (ref 65–140)
GLUCOSE SERPL-MCNC: 171 MG/DL (ref 65–140)
GLUCOSE SERPL-MCNC: 211 MG/DL (ref 65–140)
GLUCOSE SERPL-MCNC: 254 MG/DL (ref 65–140)
GLUCOSE SERPL-MCNC: 271 MG/DL (ref 65–140)
GLUCOSE SERPL-MCNC: 280 MG/DL (ref 65–140)
GLUCOSE SERPL-MCNC: 283 MG/DL (ref 65–140)
GLUCOSE SERPL-MCNC: 284 MG/DL (ref 65–140)
GLUCOSE SERPL-MCNC: 285 MG/DL (ref 65–140)
GLUCOSE SERPL-MCNC: 285 MG/DL (ref 65–140)
HBA1C MFR BLD: 6 %
HCO3 BLDA-SCNC: 22.7 MMOL/L (ref 22–28)
HCO3 BLDA-SCNC: 22.8 MMOL/L (ref 22–28)
HCO3 BLDA-SCNC: 23.1 MMOL/L (ref 24–30)
HCO3 BLDA-SCNC: 24.1 MMOL/L (ref 22–28)
HCO3 BLDA-SCNC: 25.5 MMOL/L (ref 22–28)
HCO3 BLDA-SCNC: 26.5 MMOL/L (ref 22–28)
HCO3 BLDA-SCNC: 26.6 MMOL/L (ref 22–28)
HCO3 BLDA-SCNC: 29.4 MMOL/L (ref 22–28)
HCT VFR BLD AUTO: 31.7 % (ref 36.5–49.3)
HCT VFR BLD AUTO: 32.7 % (ref 36.5–49.3)
HCT VFR BLD AUTO: 33.3 % (ref 36.5–49.3)
HCT VFR BLD CALC: 22 % (ref 36.5–49.3)
HCT VFR BLD CALC: 25 % (ref 36.5–49.3)
HCT VFR BLD CALC: 25 % (ref 36.5–49.3)
HCT VFR BLD CALC: 26 % (ref 36.5–49.3)
HCT VFR BLD CALC: 27 % (ref 36.5–49.3)
HCT VFR BLD CALC: 28 % (ref 36.5–49.3)
HDLC SERPL-MCNC: 39 MG/DL
HGB BLD-MCNC: 10.5 G/DL (ref 12–17)
HGB BLD-MCNC: 10.9 G/DL (ref 12–17)
HGB BLD-MCNC: 11 G/DL (ref 12–17)
HGB BLDA-MCNC: 7.5 G/DL (ref 12–17)
HGB BLDA-MCNC: 8.5 G/DL (ref 12–17)
HGB BLDA-MCNC: 8.5 G/DL (ref 12–17)
HGB BLDA-MCNC: 8.8 G/DL (ref 12–17)
HGB BLDA-MCNC: 9.2 G/DL (ref 12–17)
HGB BLDA-MCNC: 9.5 G/DL (ref 12–17)
INR PPP: 0.7 (ref 0.85–1.19)
INR PPP: 0.76 (ref 0.85–1.19)
KCT BLD-ACNC: 129 SEC (ref 89–137)
KCT BLD-ACNC: 129 SEC (ref 89–137)
KCT BLD-ACNC: 475 SEC (ref 89–137)
KCT BLD-ACNC: 476 SEC (ref 89–137)
KCT BLD-ACNC: 495 SEC (ref 89–137)
KCT BLD-ACNC: 683 SEC (ref 89–137)
KCT BLD-ACNC: 757 SEC (ref 89–137)
LDLC SERPL CALC-MCNC: 78 MG/DL (ref 0–100)
MAGNESIUM SERPL-MCNC: 1.9 MG/DL (ref 1.9–2.7)
MAGNESIUM SERPL-MCNC: 3 MG/DL (ref 1.9–2.7)
MCH RBC QN AUTO: 29.2 PG (ref 26.8–34.3)
MCH RBC QN AUTO: 29.3 PG (ref 26.8–34.3)
MCHC RBC AUTO-ENTMCNC: 33 G/DL (ref 31.4–37.4)
MCHC RBC AUTO-ENTMCNC: 33.1 G/DL (ref 31.4–37.4)
MCV RBC AUTO: 88 FL (ref 82–98)
MCV RBC AUTO: 89 FL (ref 82–98)
NONHDLC SERPL-MCNC: 88 MG/DL
P AXIS: 70 DEGREES
P AXIS: 72 DEGREES
PCO2 BLD: 24 MMOL/L (ref 21–32)
PCO2 BLD: 26 MMOL/L (ref 21–32)
PCO2 BLD: 27 MMOL/L (ref 21–32)
PCO2 BLD: 28 MMOL/L (ref 21–32)
PCO2 BLD: 28 MMOL/L (ref 21–32)
PCO2 BLD: 31 MMOL/L (ref 21–32)
PCO2 BLD: 35.4 MM HG (ref 36–44)
PCO2 BLD: 37.5 MM HG (ref 42–50)
PCO2 BLD: 38 MM HG (ref 36–44)
PCO2 BLD: 47.4 MM HG (ref 36–44)
PCO2 BLD: 53.6 MM HG (ref 36–44)
PCO2 BLD: 57.4 MM HG (ref 36–44)
PCO2 BLD: 58.7 MM HG (ref 36–44)
PCO2 BLD: 59.9 MM HG (ref 36–44)
PH BLD: 7.23 [PH] (ref 7.35–7.45)
PH BLD: 7.25 [PH] (ref 7.35–7.45)
PH BLD: 7.25 [PH] (ref 7.35–7.45)
PH BLD: 7.3 [PH] (ref 7.35–7.45)
PH BLD: 7.39 [PH] (ref 7.35–7.45)
PH BLD: 7.4 [PH] (ref 7.35–7.45)
PH BLD: 7.4 [PH] (ref 7.3–7.4)
PH BLD: 7.42 [PH] (ref 7.35–7.45)
PHOSPHATE SERPL-MCNC: 3.4 MG/DL (ref 2.3–4.1)
PLATELET # BLD AUTO: 125 THOUSANDS/UL (ref 149–390)
PLATELET # BLD AUTO: 176 THOUSANDS/UL (ref 149–390)
PLATELET # BLD AUTO: 182 THOUSANDS/UL (ref 149–390)
PLATELET # BLD AUTO: 192 THOUSANDS/UL (ref 149–390)
PMV BLD AUTO: 8.6 FL (ref 8.9–12.7)
PMV BLD AUTO: 9 FL (ref 8.9–12.7)
PMV BLD AUTO: 9 FL (ref 8.9–12.7)
PMV BLD AUTO: 9.2 FL (ref 8.9–12.7)
PO2 BLD: 102 MM HG (ref 75–129)
PO2 BLD: 110 MM HG (ref 75–129)
PO2 BLD: 204 MM HG (ref 75–129)
PO2 BLD: 232 MM HG (ref 75–129)
PO2 BLD: 239 MM HG (ref 75–129)
PO2 BLD: 36 MM HG (ref 35–45)
PO2 BLD: 377 MM HG (ref 75–129)
PO2 BLD: 388 MM HG (ref 75–129)
PO2 BLD: 71 MM HG (ref 75–129)
POTASSIUM BLD-SCNC: 3.3 MMOL/L (ref 3.5–5.3)
POTASSIUM BLD-SCNC: 3.3 MMOL/L (ref 3.5–5.3)
POTASSIUM BLD-SCNC: 3.4 MMOL/L (ref 3.5–5.3)
POTASSIUM BLD-SCNC: 3.5 MMOL/L (ref 3.5–5.3)
POTASSIUM BLD-SCNC: 3.6 MMOL/L (ref 3.5–5.3)
POTASSIUM BLD-SCNC: 4.2 MMOL/L (ref 3.5–5.3)
POTASSIUM BLD-SCNC: 4.8 MMOL/L (ref 3.5–5.3)
POTASSIUM BLD-SCNC: 5.1 MMOL/L (ref 3.5–5.3)
POTASSIUM SERPL-SCNC: 3.9 MMOL/L (ref 3.5–5.3)
POTASSIUM SERPL-SCNC: 3.9 MMOL/L (ref 3.5–5.3)
POTASSIUM SERPL-SCNC: 4.3 MMOL/L (ref 3.5–5.3)
PR INTERVAL: 168 MS
PR INTERVAL: 184 MS
PROTHROMBIN TIME: 10.3 SECONDS (ref 12.3–15)
PROTHROMBIN TIME: 11 SECONDS (ref 12.3–15)
QRS AXIS: -51 DEGREES
QRS AXIS: -61 DEGREES
QRSD INTERVAL: 110 MS
QRSD INTERVAL: 90 MS
QT INTERVAL: 454 MS
QT INTERVAL: 542 MS
QTC INTERVAL: 484 MS
QTC INTERVAL: 503 MS
RBC # BLD AUTO: 3.58 MILLION/UL (ref 3.88–5.62)
RBC # BLD AUTO: 3.77 MILLION/UL (ref 3.88–5.62)
RH BLD: POSITIVE
SAO2 % BLD FROM PO2: 100 % (ref 60–85)
SAO2 % BLD FROM PO2: 69 % (ref 60–85)
SAO2 % BLD FROM PO2: 97 % (ref 60–85)
SAO2 % BLD FROM PO2: 97 % (ref 60–85)
SODIUM BLD-SCNC: 134 MMOL/L (ref 136–145)
SODIUM BLD-SCNC: 135 MMOL/L (ref 136–145)
SODIUM BLD-SCNC: 136 MMOL/L (ref 136–145)
SODIUM BLD-SCNC: 136 MMOL/L (ref 136–145)
SODIUM BLD-SCNC: 139 MMOL/L (ref 136–145)
SODIUM BLD-SCNC: 142 MMOL/L (ref 136–145)
SODIUM BLD-SCNC: 144 MMOL/L (ref 136–145)
SODIUM BLD-SCNC: 144 MMOL/L (ref 136–145)
SODIUM SERPL-SCNC: 137 MMOL/L (ref 135–147)
SODIUM SERPL-SCNC: 145 MMOL/L (ref 135–147)
SPECIMEN EXPIRATION DATE: NORMAL
SPECIMEN SOURCE: ABNORMAL
SPECIMEN SOURCE: NORMAL
SPECIMEN SOURCE: NORMAL
T WAVE AXIS: -75 DEGREES
T WAVE AXIS: 237 DEGREES
TRIGL SERPL-MCNC: 51 MG/DL
UNIT DISPENSE STATUS: NORMAL
UNIT PRODUCT CODE: NORMAL
UNIT PRODUCT VOLUME: 125 ML
UNIT PRODUCT VOLUME: 280 ML
UNIT PRODUCT VOLUME: 300 ML
UNIT PRODUCT VOLUME: 350 ML
UNIT RH: NORMAL
VENTRICULAR RATE: 48 BPM
VENTRICULAR RATE: 74 BPM
WBC # BLD AUTO: 10.38 THOUSAND/UL (ref 4.31–10.16)
WBC # BLD AUTO: 9.88 THOUSAND/UL (ref 4.31–10.16)

## 2024-09-20 PROCEDURE — 85730 THROMBOPLASTIN TIME PARTIAL: CPT | Performed by: THORACIC SURGERY (CARDIOTHORACIC VASCULAR SURGERY)

## 2024-09-20 PROCEDURE — 85014 HEMATOCRIT: CPT

## 2024-09-20 PROCEDURE — 85384 FIBRINOGEN ACTIVITY: CPT | Performed by: PHYSICIAN ASSISTANT

## 2024-09-20 PROCEDURE — 86901 BLOOD TYPING SEROLOGIC RH(D): CPT | Performed by: THORACIC SURGERY (CARDIOTHORACIC VASCULAR SURGERY)

## 2024-09-20 PROCEDURE — 85014 HEMATOCRIT: CPT | Performed by: PHYSICIAN ASSISTANT

## 2024-09-20 PROCEDURE — 94002 VENT MGMT INPAT INIT DAY: CPT

## 2024-09-20 PROCEDURE — 82948 REAGENT STRIP/BLOOD GLUCOSE: CPT

## 2024-09-20 PROCEDURE — 86850 RBC ANTIBODY SCREEN: CPT | Performed by: THORACIC SURGERY (CARDIOTHORACIC VASCULAR SURGERY)

## 2024-09-20 PROCEDURE — 99223 1ST HOSP IP/OBS HIGH 75: CPT | Performed by: INTERNAL MEDICINE

## 2024-09-20 PROCEDURE — 83735 ASSAY OF MAGNESIUM: CPT | Performed by: PHYSICIAN ASSISTANT

## 2024-09-20 PROCEDURE — 80048 BASIC METABOLIC PNL TOTAL CA: CPT | Performed by: PHYSICIAN ASSISTANT

## 2024-09-20 PROCEDURE — 85347 COAGULATION TIME ACTIVATED: CPT

## 2024-09-20 PROCEDURE — P9012 CRYOPRECIPITATE EACH UNIT: HCPCS

## 2024-09-20 PROCEDURE — 85018 HEMOGLOBIN: CPT | Performed by: PHYSICIAN ASSISTANT

## 2024-09-20 PROCEDURE — 82330 ASSAY OF CALCIUM: CPT | Performed by: PHYSICIAN ASSISTANT

## 2024-09-20 PROCEDURE — 82330 ASSAY OF CALCIUM: CPT

## 2024-09-20 PROCEDURE — 88304 TISSUE EXAM BY PATHOLOGIST: CPT | Performed by: PATHOLOGY

## 2024-09-20 PROCEDURE — 85610 PROTHROMBIN TIME: CPT | Performed by: PHYSICIAN ASSISTANT

## 2024-09-20 PROCEDURE — P9017 PLASMA 1 DONOR FRZ W/IN 8 HR: HCPCS

## 2024-09-20 PROCEDURE — 85027 COMPLETE CBC AUTOMATED: CPT | Performed by: PHYSICIAN ASSISTANT

## 2024-09-20 PROCEDURE — 94760 N-INVAS EAR/PLS OXIMETRY 1: CPT

## 2024-09-20 PROCEDURE — 82803 BLOOD GASES ANY COMBINATION: CPT

## 2024-09-20 PROCEDURE — 84132 ASSAY OF SERUM POTASSIUM: CPT

## 2024-09-20 PROCEDURE — 84295 ASSAY OF SERUM SODIUM: CPT

## 2024-09-20 PROCEDURE — 83036 HEMOGLOBIN GLYCOSYLATED A1C: CPT | Performed by: PHYSICIAN ASSISTANT

## 2024-09-20 PROCEDURE — 86900 BLOOD TYPING SEROLOGIC ABO: CPT | Performed by: THORACIC SURGERY (CARDIOTHORACIC VASCULAR SURGERY)

## 2024-09-20 PROCEDURE — P9037 PLATE PHERES LEUKOREDU IRRAD: HCPCS

## 2024-09-20 PROCEDURE — 99024 POSTOP FOLLOW-UP VISIT: CPT | Performed by: PHYSICIAN ASSISTANT

## 2024-09-20 PROCEDURE — 85610 PROTHROMBIN TIME: CPT | Performed by: THORACIC SURGERY (CARDIOTHORACIC VASCULAR SURGERY)

## 2024-09-20 PROCEDURE — 80061 LIPID PANEL: CPT | Performed by: PHYSICIAN ASSISTANT

## 2024-09-20 PROCEDURE — 85049 AUTOMATED PLATELET COUNT: CPT | Performed by: THORACIC SURGERY (CARDIOTHORACIC VASCULAR SURGERY)

## 2024-09-20 PROCEDURE — 82947 ASSAY GLUCOSE BLOOD QUANT: CPT

## 2024-09-20 PROCEDURE — 93010 ELECTROCARDIOGRAM REPORT: CPT | Performed by: INTERNAL MEDICINE

## 2024-09-20 PROCEDURE — NC001 PR NO CHARGE: Performed by: PHYSICIAN ASSISTANT

## 2024-09-20 PROCEDURE — 85384 FIBRINOGEN ACTIVITY: CPT | Performed by: THORACIC SURGERY (CARDIOTHORACIC VASCULAR SURGERY)

## 2024-09-20 PROCEDURE — 84132 ASSAY OF SERUM POTASSIUM: CPT | Performed by: PHYSICIAN ASSISTANT

## 2024-09-20 PROCEDURE — 85730 THROMBOPLASTIN TIME PARTIAL: CPT | Performed by: PHYSICIAN ASSISTANT

## 2024-09-20 PROCEDURE — 88311 DECALCIFY TISSUE: CPT | Performed by: PATHOLOGY

## 2024-09-20 PROCEDURE — 84100 ASSAY OF PHOSPHORUS: CPT | Performed by: PHYSICIAN ASSISTANT

## 2024-09-20 PROCEDURE — 93005 ELECTROCARDIOGRAM TRACING: CPT

## 2024-09-20 PROCEDURE — 71045 X-RAY EXAM CHEST 1 VIEW: CPT

## 2024-09-20 DEVICE — IMPLANTABLE DEVICE: Type: IMPLANTABLE DEVICE | Site: AORTA | Status: FUNCTIONAL

## 2024-09-20 DEVICE — IMPLANTABLE DEVICE: Type: IMPLANTABLE DEVICE | Site: CHEST | Status: FUNCTIONAL

## 2024-09-20 RX ORDER — ATORVASTATIN CALCIUM 80 MG/1
80 TABLET, FILM COATED ORAL
Status: DISCONTINUED | OUTPATIENT
Start: 2024-09-20 | End: 2024-09-22

## 2024-09-20 RX ORDER — POTASSIUM CHLORIDE 14.9 MG/ML
20 INJECTION INTRAVENOUS ONCE AS NEEDED
Status: DISCONTINUED | OUTPATIENT
Start: 2024-09-20 | End: 2024-09-21

## 2024-09-20 RX ORDER — FENTANYL CITRATE 50 UG/ML
50 INJECTION, SOLUTION INTRAMUSCULAR; INTRAVENOUS
Status: DISCONTINUED | OUTPATIENT
Start: 2024-09-20 | End: 2024-09-20

## 2024-09-20 RX ORDER — FUROSEMIDE 10 MG/ML
40 INJECTION INTRAMUSCULAR; INTRAVENOUS
Status: DISCONTINUED | OUTPATIENT
Start: 2024-09-20 | End: 2024-09-20

## 2024-09-20 RX ORDER — FOLIC ACID 1 MG/1
1 TABLET ORAL DAILY
Status: DISCONTINUED | OUTPATIENT
Start: 2024-09-20 | End: 2024-09-24 | Stop reason: HOSPADM

## 2024-09-20 RX ORDER — MAGNESIUM SULFATE HEPTAHYDRATE 40 MG/ML
2 INJECTION, SOLUTION INTRAVENOUS ONCE
Status: COMPLETED | OUTPATIENT
Start: 2024-09-20 | End: 2024-09-20

## 2024-09-20 RX ORDER — METOPROLOL TARTRATE 25 MG/1
25 TABLET, FILM COATED ORAL EVERY 12 HOURS SCHEDULED
Status: DISCONTINUED | OUTPATIENT
Start: 2024-09-20 | End: 2024-09-20

## 2024-09-20 RX ORDER — SODIUM CHLORIDE 450 MG/100ML
20 INJECTION, SOLUTION INTRAVENOUS CONTINUOUS
Status: DISCONTINUED | OUTPATIENT
Start: 2024-09-20 | End: 2024-09-21

## 2024-09-20 RX ORDER — GLYCOPYRROLATE 0.2 MG/ML
INJECTION INTRAMUSCULAR; INTRAVENOUS AS NEEDED
Status: DISCONTINUED | OUTPATIENT
Start: 2024-09-20 | End: 2024-09-20

## 2024-09-20 RX ORDER — POTASSIUM CHLORIDE 29.8 MG/ML
40 INJECTION INTRAVENOUS ONCE AS NEEDED
Status: COMPLETED | OUTPATIENT
Start: 2024-09-20 | End: 2024-09-20

## 2024-09-20 RX ORDER — POLYETHYLENE GLYCOL 3350 17 G/17G
17 POWDER, FOR SOLUTION ORAL DAILY
Status: DISCONTINUED | OUTPATIENT
Start: 2024-09-20 | End: 2024-09-24 | Stop reason: HOSPADM

## 2024-09-20 RX ORDER — PROTAMINE SULFATE 10 MG/ML
INJECTION, SOLUTION INTRAVENOUS AS NEEDED
Status: DISCONTINUED | OUTPATIENT
Start: 2024-09-20 | End: 2024-09-20

## 2024-09-20 RX ORDER — PANTOPRAZOLE SODIUM 40 MG/1
40 TABLET, DELAYED RELEASE ORAL
Status: DISCONTINUED | OUTPATIENT
Start: 2024-09-20 | End: 2024-09-24 | Stop reason: HOSPADM

## 2024-09-20 RX ORDER — HEPARIN SODIUM 5000 [USP'U]/ML
5000 INJECTION, SOLUTION INTRAVENOUS; SUBCUTANEOUS EVERY 8 HOURS SCHEDULED
Status: DISCONTINUED | OUTPATIENT
Start: 2024-09-20 | End: 2024-09-24 | Stop reason: HOSPADM

## 2024-09-20 RX ORDER — CHLORHEXIDINE GLUCONATE ORAL RINSE 1.2 MG/ML
15 SOLUTION DENTAL 2 TIMES DAILY
Status: DISCONTINUED | OUTPATIENT
Start: 2024-09-20 | End: 2024-09-21

## 2024-09-20 RX ORDER — CEFAZOLIN SODIUM 2 G/50ML
2000 SOLUTION INTRAVENOUS EVERY 8 HOURS
Status: COMPLETED | OUTPATIENT
Start: 2024-09-20 | End: 2024-09-21

## 2024-09-20 RX ORDER — METOPROLOL TARTRATE 25 MG/1
25 TABLET, FILM COATED ORAL EVERY 12 HOURS SCHEDULED
Status: DISCONTINUED | OUTPATIENT
Start: 2024-09-20 | End: 2024-09-22

## 2024-09-20 RX ORDER — OXYCODONE HYDROCHLORIDE 5 MG/1
5 TABLET ORAL EVERY 4 HOURS PRN
Status: DISCONTINUED | OUTPATIENT
Start: 2024-09-20 | End: 2024-09-24 | Stop reason: HOSPADM

## 2024-09-20 RX ORDER — NEOSTIGMINE METHYLSULFATE 1 MG/ML
INJECTION INTRAVENOUS AS NEEDED
Status: DISCONTINUED | OUTPATIENT
Start: 2024-09-20 | End: 2024-09-20

## 2024-09-20 RX ORDER — FONDAPARINUX SODIUM 2.5 MG/.5ML
2.5 INJECTION SUBCUTANEOUS DAILY
Status: DISCONTINUED | OUTPATIENT
Start: 2024-09-20 | End: 2024-09-20

## 2024-09-20 RX ORDER — CALCIUM GLUCONATE 20 MG/ML
2 INJECTION, SOLUTION INTRAVENOUS ONCE AS NEEDED
Status: DISCONTINUED | OUTPATIENT
Start: 2024-09-20 | End: 2024-09-20

## 2024-09-20 RX ORDER — LANOLIN ALCOHOL/MO/W.PET/CERES
100 CREAM (GRAM) TOPICAL DAILY
Status: DISCONTINUED | OUTPATIENT
Start: 2024-09-20 | End: 2024-09-24 | Stop reason: HOSPADM

## 2024-09-20 RX ORDER — ONDANSETRON 2 MG/ML
4 INJECTION INTRAMUSCULAR; INTRAVENOUS EVERY 6 HOURS PRN
Status: DISCONTINUED | OUTPATIENT
Start: 2024-09-20 | End: 2024-09-24 | Stop reason: HOSPADM

## 2024-09-20 RX ORDER — AMIODARONE HYDROCHLORIDE 150 MG/3ML
INJECTION, SOLUTION INTRAVENOUS AS NEEDED
Status: DISCONTINUED | OUTPATIENT
Start: 2024-09-20 | End: 2024-09-20

## 2024-09-20 RX ORDER — HYDROMORPHONE HCL/PF 1 MG/ML
0.5 SYRINGE (ML) INJECTION EVERY 2 HOUR PRN
Status: DISCONTINUED | OUTPATIENT
Start: 2024-09-20 | End: 2024-09-20

## 2024-09-20 RX ORDER — LIDOCAINE HCL/PF 100 MG/5ML
SYRINGE (ML) INJECTION AS NEEDED
Status: DISCONTINUED | OUTPATIENT
Start: 2024-09-20 | End: 2024-09-20

## 2024-09-20 RX ORDER — AMIODARONE HYDROCHLORIDE 200 MG/1
200 TABLET ORAL EVERY 8 HOURS SCHEDULED
Status: DISCONTINUED | OUTPATIENT
Start: 2024-09-20 | End: 2024-09-24 | Stop reason: HOSPADM

## 2024-09-20 RX ORDER — ASPIRIN 325 MG
325 TABLET ORAL DAILY
Status: DISCONTINUED | OUTPATIENT
Start: 2024-09-20 | End: 2024-09-24 | Stop reason: HOSPADM

## 2024-09-20 RX ORDER — MAGNESIUM SULFATE 500 MG/ML
VIAL (ML) INJECTION AS NEEDED
Status: DISCONTINUED | OUTPATIENT
Start: 2024-09-20 | End: 2024-09-20

## 2024-09-20 RX ORDER — CALCIUM CHLORIDE 100 MG/ML
INJECTION INTRAVENOUS; INTRAVENTRICULAR AS NEEDED
Status: DISCONTINUED | OUTPATIENT
Start: 2024-09-20 | End: 2024-09-20

## 2024-09-20 RX ORDER — BISACODYL 10 MG
10 SUPPOSITORY, RECTAL RECTAL DAILY PRN
Status: DISCONTINUED | OUTPATIENT
Start: 2024-09-20 | End: 2024-09-21

## 2024-09-20 RX ORDER — CALCIUM GLUCONATE 20 MG/ML
2 INJECTION, SOLUTION INTRAVENOUS ONCE
Status: COMPLETED | OUTPATIENT
Start: 2024-09-20 | End: 2024-09-20

## 2024-09-20 RX ORDER — ONDANSETRON 2 MG/ML
INJECTION INTRAMUSCULAR; INTRAVENOUS AS NEEDED
Status: DISCONTINUED | OUTPATIENT
Start: 2024-09-20 | End: 2024-09-20

## 2024-09-20 RX ORDER — POTASSIUM CHLORIDE 29.8 MG/ML
40 INJECTION INTRAVENOUS ONCE AS NEEDED
Status: DISCONTINUED | OUTPATIENT
Start: 2024-09-20 | End: 2024-09-20

## 2024-09-20 RX ORDER — FENTANYL CITRATE 50 UG/ML
50 INJECTION, SOLUTION INTRAMUSCULAR; INTRAVENOUS ONCE
Status: DISCONTINUED | OUTPATIENT
Start: 2024-09-20 | End: 2024-09-20

## 2024-09-20 RX ORDER — SODIUM CHLORIDE, SODIUM LACTATE, POTASSIUM CHLORIDE, CALCIUM CHLORIDE 600; 310; 30; 20 MG/100ML; MG/100ML; MG/100ML; MG/100ML
INJECTION, SOLUTION INTRAVENOUS CONTINUOUS PRN
Status: DISCONTINUED | OUTPATIENT
Start: 2024-09-20 | End: 2024-09-20

## 2024-09-20 RX ORDER — ACETAMINOPHEN 325 MG/1
975 TABLET ORAL
Status: DISCONTINUED | OUTPATIENT
Start: 2024-09-20 | End: 2024-09-24 | Stop reason: HOSPADM

## 2024-09-20 RX ADMIN — POTASSIUM CHLORIDE 40 MEQ: 29.8 INJECTION, SOLUTION INTRAVENOUS at 09:04

## 2024-09-20 RX ADMIN — COAGULATION FACTOR VIIA (RECOMBINANT) 6 MG: KIT at 02:49

## 2024-09-20 RX ADMIN — NOREPINEPHRINE BITARTRATE 8 MCG: 1 INJECTION, SOLUTION, CONCENTRATE INTRAVENOUS at 03:49

## 2024-09-20 RX ADMIN — CEFAZOLIN SODIUM 2000 MG: 2 SOLUTION INTRAVENOUS at 10:29

## 2024-09-20 RX ADMIN — FOLIC ACID 1 MG: 1 TABLET ORAL at 10:30

## 2024-09-20 RX ADMIN — SODIUM CHLORIDE 20 ML/HR: 0.45 INJECTION, SOLUTION INTRAVENOUS at 04:37

## 2024-09-20 RX ADMIN — CALCIUM GLUCONATE 2 G: 20 INJECTION, SOLUTION INTRAVENOUS at 20:15

## 2024-09-20 RX ADMIN — SODIUM CHLORIDE, SODIUM LACTATE, POTASSIUM CHLORIDE, CALCIUM CHLORIDE: 600; 310; 30; 20 INJECTION, SOLUTION INTRAVENOUS at 03:25

## 2024-09-20 RX ADMIN — POTASSIUM PHOSPHATE, MONOBASIC POTASSIUM PHOSPHATE, DIBASIC 21 MMOL: 224; 236 INJECTION, SOLUTION, CONCENTRATE INTRAVENOUS at 22:18

## 2024-09-20 RX ADMIN — FENTANYL CITRATE 100 MCG: 0.05 INJECTION, SOLUTION INTRAMUSCULAR; INTRAVENOUS at 04:04

## 2024-09-20 RX ADMIN — HEPARIN SODIUM 5000 UNITS: 5000 INJECTION INTRAVENOUS; SUBCUTANEOUS at 08:20

## 2024-09-20 RX ADMIN — HEPARIN SODIUM 5000 UNITS: 1000 INJECTION INTRAVENOUS; SUBCUTANEOUS at 00:42

## 2024-09-20 RX ADMIN — HEPARIN SODIUM 5000 UNITS: 1000 INJECTION INTRAVENOUS; SUBCUTANEOUS at 02:13

## 2024-09-20 RX ADMIN — MUPIROCIN 1 APPLICATION: 20 OINTMENT TOPICAL at 20:15

## 2024-09-20 RX ADMIN — CALCIUM CHLORIDE 0.5 G: 100 INJECTION INTRAVENOUS; INTRAVENTRICULAR at 02:38

## 2024-09-20 RX ADMIN — LIDOCAINE HYDROCHLORIDE 100 MG: 20 INJECTION INTRAVENOUS at 01:32

## 2024-09-20 RX ADMIN — ALBUMIN (HUMAN): 12.5 INJECTION, SOLUTION INTRAVENOUS at 01:25

## 2024-09-20 RX ADMIN — NOREPINEPHRINE BITARTRATE 8 MCG: 1 INJECTION, SOLUTION, CONCENTRATE INTRAVENOUS at 02:53

## 2024-09-20 RX ADMIN — AMIODARONE HYDROCHLORIDE 150 MG: 50 INJECTION, SOLUTION INTRAVENOUS at 01:30

## 2024-09-20 RX ADMIN — CALCIUM CHLORIDE 0.5 G: 100 INJECTION INTRAVENOUS; INTRAVENTRICULAR at 03:02

## 2024-09-20 RX ADMIN — CALCIUM CHLORIDE 1 G: 100 INJECTION INTRAVENOUS; INTRAVENTRICULAR at 01:44

## 2024-09-20 RX ADMIN — ONDANSETRON 4 MG: 2 INJECTION INTRAMUSCULAR; INTRAVENOUS at 02:31

## 2024-09-20 RX ADMIN — CALCIUM CHLORIDE 0.5 G: 100 INJECTION INTRAVENOUS; INTRAVENTRICULAR at 02:45

## 2024-09-20 RX ADMIN — THIAMINE HCL TAB 100 MG 100 MG: 100 TAB at 10:30

## 2024-09-20 RX ADMIN — HEPARIN SODIUM 5000 UNITS: 1000 INJECTION INTRAVENOUS; SUBCUTANEOUS at 01:21

## 2024-09-20 RX ADMIN — MAGNESIUM SULFATE HEPTAHYDRATE 2 G: 40 INJECTION, SOLUTION INTRAVENOUS at 21:11

## 2024-09-20 RX ADMIN — CHLORHEXIDINE GLUCONATE 0.12% ORAL RINSE 15 ML: 1.2 LIQUID ORAL at 20:15

## 2024-09-20 RX ADMIN — MUPIROCIN 1 APPLICATION: 20 OINTMENT TOPICAL at 08:20

## 2024-09-20 RX ADMIN — NICARDIPINE HYDROCHLORIDE 200 MCG: 25 INJECTION, SOLUTION INTRAVENOUS at 03:31

## 2024-09-20 RX ADMIN — ASPIRIN 325 MG ORAL TABLET 325 MG: 325 PILL ORAL at 08:21

## 2024-09-20 RX ADMIN — CHLORHEXIDINE GLUCONATE 0.12% ORAL RINSE 15 ML: 1.2 LIQUID ORAL at 08:20

## 2024-09-20 RX ADMIN — MAGNESIUM SULFATE HEPTAHYDRATE 2 G: 500 INJECTION, SOLUTION INTRAMUSCULAR; INTRAVENOUS at 01:30

## 2024-09-20 RX ADMIN — NOREPINEPHRINE BITARTRATE 16 MCG: 1 INJECTION, SOLUTION, CONCENTRATE INTRAVENOUS at 02:37

## 2024-09-20 RX ADMIN — SODIUM CHLORIDE, SODIUM LACTATE, POTASSIUM CHLORIDE, CALCIUM CHLORIDE: 600; 310; 30; 20 INJECTION, SOLUTION INTRAVENOUS at 03:59

## 2024-09-20 RX ADMIN — METOPROLOL TARTRATE 25 MG: 25 TABLET, FILM COATED ORAL at 22:16

## 2024-09-20 RX ADMIN — PROPOFOL 30 MCG/KG/MIN: 10 INJECTION, EMULSION INTRAVENOUS at 01:31

## 2024-09-20 RX ADMIN — EPINEPHRINE 4 MCG/MIN: 1 INJECTION, SOLUTION, CONCENTRATE INTRAVENOUS at 01:45

## 2024-09-20 RX ADMIN — GLYCOPYRROLATE 0.2 MCG: 0.2 INJECTION, SOLUTION INTRAMUSCULAR; INTRAVENOUS at 02:43

## 2024-09-20 RX ADMIN — Medication 250 ML: at 00:50

## 2024-09-20 RX ADMIN — SODIUM CHLORIDE 3 UNITS/HR: 9 INJECTION, SOLUTION INTRAVENOUS at 04:47

## 2024-09-20 RX ADMIN — NICARDIPINE HYDROCHLORIDE 7 MG/HR: 2.5 INJECTION, SOLUTION INTRAVENOUS at 18:49

## 2024-09-20 RX ADMIN — Medication 250 ML: at 00:48

## 2024-09-20 RX ADMIN — ATORVASTATIN CALCIUM 80 MG: 80 TABLET, FILM COATED ORAL at 17:03

## 2024-09-20 RX ADMIN — CEFAZOLIN SODIUM 2000 MG: 2 SOLUTION INTRAVENOUS at 17:03

## 2024-09-20 RX ADMIN — NOREPINEPHRINE BITARTRATE 16 MCG: 1 INJECTION, SOLUTION, CONCENTRATE INTRAVENOUS at 02:33

## 2024-09-20 RX ADMIN — CEFAZOLIN 2000 MG: 1 INJECTION, POWDER, FOR SOLUTION INTRAMUSCULAR; INTRAVENOUS at 02:45

## 2024-09-20 RX ADMIN — SODIUM BICARBONATE 25 MEQ: 84 INJECTION, SOLUTION INTRAVENOUS at 00:09

## 2024-09-20 RX ADMIN — POTASSIUM CHLORIDE 20 MEQ: 29.8 INJECTION, SOLUTION INTRAVENOUS at 00:42

## 2024-09-20 RX ADMIN — VASOPRESSIN 0.04 UNITS/MIN: 20 INJECTION INTRAVENOUS at 02:02

## 2024-09-20 RX ADMIN — ACETAMINOPHEN 975 MG: 325 TABLET ORAL at 20:15

## 2024-09-20 RX ADMIN — ACETAMINOPHEN 975 MG: 325 TABLET ORAL at 08:20

## 2024-09-20 RX ADMIN — AMIODARONE HYDROCHLORIDE 200 MG: 200 TABLET ORAL at 22:16

## 2024-09-20 RX ADMIN — ACETAMINOPHEN 975 MG: 325 TABLET ORAL at 14:13

## 2024-09-20 RX ADMIN — CALCIUM CHLORIDE 0.5 G: 100 INJECTION INTRAVENOUS; INTRAVENTRICULAR at 03:21

## 2024-09-20 RX ADMIN — PHENYLEPHRINE HYDROCHLORIDE 250 MCG: 10 INJECTION INTRAVENOUS at 01:32

## 2024-09-20 RX ADMIN — MAGNESIUM SULFATE HEPTAHYDRATE 2 G: 40 INJECTION, SOLUTION INTRAVENOUS at 04:51

## 2024-09-20 RX ADMIN — NICARDIPINE HYDROCHLORIDE 5 MG/HR: 2.5 INJECTION, SOLUTION INTRAVENOUS at 15:14

## 2024-09-20 RX ADMIN — PHENYLEPHRINE HYDROCHLORIDE 250 MCG: 10 INJECTION INTRAVENOUS at 01:33

## 2024-09-20 RX ADMIN — SODIUM CHLORIDE 4 UNITS/HR: 9 INJECTION, SOLUTION INTRAVENOUS at 00:11

## 2024-09-20 RX ADMIN — Medication 1 TABLET: at 10:30

## 2024-09-20 RX ADMIN — AMIODARONE HYDROCHLORIDE 200 MG: 200 TABLET ORAL at 14:13

## 2024-09-20 RX ADMIN — FENTANYL CITRATE 150 MCG: 0.05 INJECTION, SOLUTION INTRAMUSCULAR; INTRAVENOUS at 02:28

## 2024-09-20 RX ADMIN — NICARDIPINE HYDROCHLORIDE 7 MG/HR: 2.5 INJECTION, SOLUTION INTRAVENOUS at 22:34

## 2024-09-20 RX ADMIN — ALBUMIN (HUMAN): 12.5 INJECTION, SOLUTION INTRAVENOUS at 02:34

## 2024-09-20 RX ADMIN — NEOSTIGMINE METHYLSULFATE 3 MG: 1 INJECTION INTRAVENOUS at 03:52

## 2024-09-20 RX ADMIN — AMIODARONE HYDROCHLORIDE 200 MG: 200 TABLET ORAL at 05:03

## 2024-09-20 RX ADMIN — GLYCOPYRROLATE 6 MCG: 0.2 INJECTION, SOLUTION INTRAMUSCULAR; INTRAVENOUS at 03:52

## 2024-09-20 RX ADMIN — HEPARIN SODIUM 5000 UNITS: 5000 INJECTION INTRAVENOUS; SUBCUTANEOUS at 14:13

## 2024-09-20 RX ADMIN — FENTANYL CITRATE 150 MCG: 0.05 INJECTION, SOLUTION INTRAMUSCULAR; INTRAVENOUS at 03:55

## 2024-09-20 RX ADMIN — FENTANYL CITRATE 50 MCG: 50 INJECTION INTRAMUSCULAR; INTRAVENOUS at 06:29

## 2024-09-20 RX ADMIN — OXYCODONE HYDROCHLORIDE 5 MG: 5 TABLET ORAL at 22:16

## 2024-09-20 RX ADMIN — POTASSIUM CHLORIDE 20 MEQ: 29.8 INJECTION, SOLUTION INTRAVENOUS at 01:16

## 2024-09-20 RX ADMIN — SODIUM BICARBONATE 25 MEQ: 84 INJECTION, SOLUTION INTRAVENOUS at 00:31

## 2024-09-20 RX ADMIN — NICARDIPINE HYDROCHLORIDE 5 MG/HR: 2.5 INJECTION, SOLUTION INTRAVENOUS at 10:12

## 2024-09-20 RX ADMIN — PROTAMINE SULFATE 350 MG: 10 INJECTION, SOLUTION INTRAVENOUS at 02:31

## 2024-09-20 RX ADMIN — HEPARIN SODIUM 5000 UNITS: 5000 INJECTION INTRAVENOUS; SUBCUTANEOUS at 22:22

## 2024-09-20 RX ADMIN — NOREPINEPHRINE BITARTRATE 4 MCG/MIN: 1 INJECTION, SOLUTION, CONCENTRATE INTRAVENOUS at 02:34

## 2024-09-20 RX ADMIN — OXYCODONE HYDROCHLORIDE 5 MG: 5 TABLET ORAL at 05:03

## 2024-09-20 RX ADMIN — NICARDIPINE HYDROCHLORIDE 2 MG/HR: 2.5 INJECTION, SOLUTION INTRAVENOUS at 05:02

## 2024-09-20 RX ADMIN — FENTANYL CITRATE 100 MCG: 0.05 INJECTION, SOLUTION INTRAMUSCULAR; INTRAVENOUS at 02:48

## 2024-09-20 NOTE — UTILIZATION REVIEW
Initial Clinical Review    Admission: Date/Time/Statement:   Admission Orders (From admission, onward)       Ordered        09/19/24 1918  Inpatient Admission  Once                          Orders Placed This Encounter   Procedures    Inpatient Admission     Standing Status:   Standing     Number of Occurrences:   1     Order Specific Question:   Level of Care     Answer:   Critical Care [15]     Order Specific Question:   Estimated length of stay     Answer:   More than 2 Midnights     Order Specific Question:   Certification     Answer:   I certify that inpatient services are medically necessary for this patient for a duration of greater than two midnights. See H&P and MD Progress Notes for additional information about the patient's course of treatment.       Initial Presentation: 59 y.o. male transferred from [facility] ED to [facility] bed for type a dissection requiring urgent surgical intervention. H/O  HTN, colon polyps, tobacco abuse.  Presented to the ED with chest pain.   OPERATIVE NOTE:  SURGERY DATE: 9/19/2024   ANESTHESIA: General endotracheal anesthesia with transesophageal echocardiogram   PROCEDURE:   1. Ascending aorta and full arch replacement with a 28 mm Gelweave graft and a 12/8/8 Gelweave trifurcated graft.  2. Frozen elephant trunk with a Linwood TAG 34 x 34 x 150 endovascular graft.  3. Resuspension of the aortic valve.      Date: 9/20   Day 2: Continue pulmonary artery catheter, central line, arterial line.  Volume resuscitation as needed.  MOnitor tele. Intra-op blood products: x3u Cryo, x3u Platelets, x3u FFP, x6u Factor 7. Intubated, paced rhythym, calm, chest tube with minimal output.      Cardiac surgery consult:  Epi, Primaocr, Vaso, and Levo have been weaned off.  Continue with Cardene. Following commands.  Maintain central access. WEan vent toward extubation.  Start Iv lasix, potassium.     9/20 UPDated:  Extubated. POD #0 s/p ascending aorta and arch  replacement and aortic valve resuspension.   Creat 1.33 up from baseline 0.9.  Trend. Strict I/os.  Continues with insulin infusion.       Temperature Pulse Respirations Blood Pressure SpO2 Pain Score   09/20/24 0443 09/20/24 0443 09/20/24 0443 09/20/24 0922 09/20/24 0441 09/20/24 0629   (!) 96.8 °F (36 °C) 74 22 133/69 98 % Med Not Given for Pain - for MAR use only     Weight (last 2 days)       Date/Time Weight    09/20/24 0426 88.1 (194.23)            Vital Signs (last 3 days)       Date/Time Temp Pulse Resp BP MAP (mmHg) Arterial Line BP MAP SpO2 FiO2 (%) Calculated FIO2 (%) - Nasal Cannula Nasal Cannula O2 Flow Rate (L/min) O2 Device CO CI CVP CVP (mean) Lita Coma Scale Score Pain    09/20/24 0923 98 °F (36.7 °C) 76 14 -- -- -- -- 95 % -- -- -- -- 5.5 L/min 2.8 L/min/m2 -- 5 mmHg -- --    09/20/24 0922 -- -- -- 133/69 92 -- -- -- -- -- -- -- -- -- -- -- -- --    09/20/24 0801 98.1 °F (36.7 °C) 85 19 -- -- 124/62 81 mmHg 96 % -- 36 4 L/min Nasal cannula 5.4 L/min 2.7 L/min/m2 -- 5 mmHg -- 2    09/20/24 0800 -- -- -- -- -- -- -- -- -- -- -- -- -- -- -- -- 15 --    09/20/24 0705 97.7 °F (36.5 °C) 80 10 -- -- 119/62 79 mmHg 98 % 50 -- -- Ventilator 5.1 L/min 2.5 L/min/m2 -- 8 mmHg -- --    09/20/24 0700 -- 85 20 -- -- 107/56 72 mmHg 99 % -- -- -- -- -- -- -- 5 mmHg -- --    09/20/24 0629 -- -- -- -- -- -- -- -- -- -- -- -- -- -- -- -- 11 Med Not Given for Pain - for MAR use only    09/20/24 0615 -- -- -- -- -- -- -- -- -- -- -- -- -- -- -- -- 3 --    09/20/24 0600 96.4 °F (35.8 °C) 75 13 -- -- 113/57 75 mmHg 96 % -- -- -- -- 5.4 L/min 2.7 L/min/m2 -- 4 mmHg 3 --    09/20/24 0545 -- -- -- -- -- -- -- -- -- -- -- -- -- -- -- -- 3 --    09/20/24 0530 -- -- -- -- -- -- -- -- -- -- -- -- -- -- -- -- 3 --    09/20/24 0515 -- -- -- -- -- -- -- -- -- -- -- -- -- -- -- -- 3 --    09/20/24 0500 96.8 °F (36 °C) 75 23 -- -- 131/62 83 mmHg 99 % -- -- -- -- 5.9 L/min 2.9 L/min/m2 -- 5 mmHg 3 --    09/20/24 0445 -- -- -- -- --  -- -- -- -- -- -- -- -- -- -- -- 3 --    09/20/24 0443 96.8 °F (36 °C) 74 22 -- -- 127/56 77 mmHg 99 % 70 -- -- Ventilator 6.4 L/min 3.2 L/min/m2 -- 11 mmHg -- --    09/20/24 0441 -- -- -- -- -- -- -- 98 % -- -- -- -- -- -- -- -- -- --    09/20/24 0425 -- -- -- -- -- -- -- -- -- -- -- -- -- -- 4 mmHg -- -- --              Pertinent Labs/Diagnostic Test Results:   Radiology:  XR chest portable ICU   Final Interpretation by Nickolas Vasquez MD (09/20 0739)      Tubes and lines as above.            Workstation performed: [id number]           Cardiology:  MAURILIO Intraop Interventional w/ realtime guidance of cardiac procedures   Final Result by SYSTEMGENERATED, DOCUMENTATION (09/20 0706)   This order contains the linked images for the MAURILIO that was performed by    the Anesthesiologist.  Please see the  CARDIAC MAURILIO ANESTHESIA procedure    for results.        9/19 CTA C/a/p: Type A dissection involving the entire aorta, and extending to the level of the distal right common iliac artery and the distal left external iliac artery.     2) Proximally the dissection extends from at least the level of the sinoatrial junction, however likely also involves the aortic root, although evaluation of this region is limited by motion artifact. No mediastinal hematoma.     3) Complex configuration of the dissection flap with several entry and reentry tears. Both true and false lumens are opacified. SMA, KARL and bilateral renal arteries arise from what is presumed to be the false lumen. All major aortic branches remain   patent.     4) Diffusely ectatic thoracic and abdominal aorta, with ascending aorta measuring up to 4.3 x 4.0 cm.     5) No intramural hematoma.     6) No other acute thoracic, abdominal, or pelvic pathology.     7) 3 nonspecific pulmonary nodules measuring up to 3 mm. Based on current Fleischner Society 2017 Guidelines on incidental pulmonary nodules, no routine follow-up is needed if the patient is low risk for lung  cancer.  If the patient is high risk,   optional follow-up chest CT in 12 months can be considered.   9/19 CXR: No acute cardiopulmonary disease.           Results from last 7 days   Lab Units 09/19/24  1625   SARS-COV-2  Negative     Results from last 7 days   Lab Units 09/20/24  0847 09/20/24  0833 09/20/24  0432 09/20/24  0353 09/20/24  0350 09/20/24  0259 09/19/24 1925 09/19/24  1607   WBC Thousand/uL 10.38* 9.88  --   --   --   --   --  13.89*   HEMOGLOBIN g/dL 11.0* 10.5*  --   --   --   --   --  14.4   I STAT HEMOGLOBIN g/dl  --   --  7.5* 8.5*  --  8.5*   < >  --    HEMATOCRIT % 33.3* 31.7*  --   --   --   --   --  44.2   HEMATOCRIT, ISTAT %  --   --  22* 25*  --  25*   < >  --    PLATELETS Thousands/uL 182 176  --   --  192  --    < > 240   TOTAL NEUT ABS Thousands/µL  --   --   --   --   --   --   --  10.17*    < > = values in this interval not displayed.         Results from last 7 days   Lab Units 09/20/24  0701 09/20/24  0432 09/20/24  0353 09/20/24  0259 09/20/24  0157 09/20/24  0145 09/19/24 1925 09/19/24  1607   SODIUM mmol/L 145  --   --   --   --   --   --  137   POTASSIUM mmol/L 3.9  --   --   --   --   --   --  4.0   CHLORIDE mmol/L 109*  --   --   --   --   --   --  101   CO2 mmol/L 27  --   --   --   --   --   --  27   CO2, I-STAT mmol/L  --  28 27 28 24 24   < >  --    ANION GAP mmol/L 9  --   --   --   --   --   --  9   BUN mg/dL 20  --   --   --   --   --   --  25   CREATININE mg/dL 1.22  --   --   --   --   --   --  1.33*   EGFR ml/min/1.73sq m 63  --   --   --   --   --   --  57   CALCIUM mg/dL 8.6  --   --   --   --   --   --  9.4   CALCIUM, IONIZED, ISTAT mmol/L  --  1.24 1.16 1.10* 1.13 0.90*   < >  --    MAGNESIUM mg/dL 3.0*  --   --   --   --   --   --   --     < > = values in this interval not displayed.     Results from last 7 days   Lab Units 09/19/24  1607   AST U/L 18   ALT U/L 17   ALK PHOS U/L 50   TOTAL PROTEIN g/dL 7.5   ALBUMIN g/dL 4.2   TOTAL BILIRUBIN mg/dL 0.65      Results from last 7 days   Lab Units 09/20/24  0830 09/20/24  0609 09/20/24  0430   POC GLUCOSE mg/dl 141* 128 166*     Results from last 7 days   Lab Units 09/20/24  0701 09/19/24  1607   GLUCOSE RANDOM mg/dL 120 122         Results from last 7 days   Lab Units 09/20/24  0833   HEMOGLOBIN A1C % 6.0*   EAG mg/dl 126       Results from last 7 days   Lab Units 09/20/24  0432 09/20/24  0353 09/20/24  0259 09/20/24  0145 09/20/24  0116 09/19/24  2305 09/19/24  2218   PH, JANY I-STAT   --   --   --   --  7.398  --  7.273*   PCO2, JANY ISTAT mm HG  --   --   --   --  37.5*  --  56.5*   PO2, JANY ISTAT mm HG  --   --   --   --  36.0  --  80.0*   HCO3, JANY ISTAT mmol/L  --   --   --   --  23.1*  --  26.1   I STAT BASE EXC mmol/L -1 -2 0   < > -2   < > -1   I STAT O2 SAT % 97* 97* 100*   < > 69   < > 94*   ISTAT PH ART  7.254* 7.246* 7.304*   < >  --    < >  --    I STAT ART PCO2 mm HG 59.9* 58.7* 53.6*   < >  --    < >  --    I STAT ART PO2 mm .0 102.0 239.0*   < >  --    < >  --    I STAT ART HCO3 mmol/L 26.5 25.5 26.6   < >  --    < >  --     < > = values in this interval not displayed.         Results from last 7 days   Lab Units 09/19/24  1759 09/19/24  1607   HS TNI 0HR ng/L  --  4   HS TNI 2HR ng/L 4  --    HSTNI D2 ng/L 0  --      Results from last 7 days   Lab Units 09/20/24  0712 09/20/24  0350   PROTIME seconds 10.3* 11.0*   INR  0.70* 0.76*   PTT seconds 31 35*       Results from last 7 days   Lab Units 09/19/24  1607   BNP pg/mL 49       Results from last 7 days   Lab Units 09/20/24  0805 09/20/24  0723 09/20/24  0555   UNIT PRODUCT CODE  W8355F49  Q7639K60  Y1533F95  T2033V23 H4619G31  T5955P91  H6996N31  P4814S71 P3070L47  D1159W60  S5087E36  V4861L32  L9161G41  A9650R74  V4404A96  Q5409O15  H3192U65   UNIT NUMBER  [id number]  [id number]  [id number]  [id number]  [id number]  [id number]  [id number]  [id number]   [id number]  [id number]  [id number]  [id number]  [id number]  [id number]  [id number]   UNITABO  O  O  O  O O  O  O  O O  O  O  O  O  AB  AB  O  A   UNITRH  POS  POS  POS  POS POS  POS  POS  POS NEG  POS  POS  POS  POS  POS  NEG  POS  POS   CROSSMATCH  Compatible  Compatible  Compatible  Compatible Compatible  Compatible  Compatible  Compatible  --    UNIT DISPENSE STATUS  Return to Inv  Return to Inv  Return to Inv  Return to Inv Crossmatched  Crossmatched  Crossmatched  Crossmatched Presumed Trans  Presumed Trans  Presumed Trans  Presumed Trans  Presumed Trans  Presumed Trans  Presumed Trans  Presumed Trans  Presumed Trans   UNIT PRODUCT VOL mL 350  350  350  350 350  350  350  350 125  280  300  125  125  280  280  300  300       Results from last 7 days   Lab Units 09/19/24  1625   INFLUENZA A PCR  Negative   INFLUENZA B PCR  Negative   RSV PCR  Negative       Past Medical History:   Diagnosis Date    Colon polyps     Hypertension        Admitting Diagnosis: Type A rupture  Age/Sex: 59 y.o. male  Admission Orders:  Scheduled Medications:  acetaminophen, 975 mg, Oral, Q6H While awake  amiodarone, 200 mg, Oral, Q8H HOSEA  aspirin, 325 mg, Oral, Daily  atorvastatin, 80 mg, Oral, Daily With Dinner  cefazolin, 2,000 mg, Intravenous, Q8H  chlorhexidine, 15 mL, Mouth/Throat, BID  folic acid, 1 mg, Oral, Daily  heparin (porcine), 5,000 Units, Subcutaneous, Q8H [facility]  multivitamin-minerals, 1 tablet, Oral, Daily  mupirocin, 1 Application, Nasal, Q12H HOSEA  pantoprazole, 40 mg, Oral, Early Morning  polyethylene glycol, 17 g, Oral, Daily  thiamine, 100 mg, Oral, Daily      Continuous IV Infusions:  insulin regular (HumuLIN R,NovoLIN R) 1 Units/mL in sodium chloride 0.9 % 100 mL infusion, 0.3-21 Units/hr, Intravenous, Titrated  niCARdipine, 2.5-15 mg/hr, Intravenous, Titrated  sodium chloride, 20 mL/hr, Intravenous,  Continuous      PRN Meds:  bisacodyl, 10 mg, Rectal, Daily PRN  calcium gluconate, 2 g, Intravenous, Once PRN  HYDROmorphone, 0.5 mg, Intravenous, Q2H PRN  lactated ringers, 500 mL, Intravenous, Once PRN  ondansetron, 4 mg, Intravenous, Q6H PRN  oxyCODONE, 2.5 mg, Oral, Q4H PRN   Or  oxyCODONE, 5 mg, Oral, Q4H PRN  potassium chloride, 20 mEq, Intravenous, Once PRN  potassium chloride, 40 mEq, Intravenous, Once PRN  potassium chloride, 40 mEq, Intravenous, Once PRN        IP CONSULT TO MEDICAL CRITICAL CARE  IP CONSULT TO CASE MANAGEMENT    Network Utilization Review Department  ATTENTION: Please call with any questions or concerns to [phone] and carefully listen to the prompts so that you are directed to the right person. All voicemails are confidential.   For Discharge needs, contact Care Management DC Support Team at [phone] opt. [phone]  Send all requests for admission clinical reviews, approved or denied determinations and any other requests to dedicated fax number below belonging to the [place] where the patient is receiving treatment. List of dedicated fax numbers for the Facilities:  FACILITY NAME UR FAX NUMBER   ADMISSION DENIALS (Administrative/Medical Necessity) [phone]   DISCHARGE SUPPORT TEAM (NETWORK) [phone]   PARENT CHILD HEALTH (Maternity/NICU/Pediatrics) [phone]   [facility] [phone]   [facility] [phone]   [facility] [phone]   [facility] [phone]   [facility] [phone]   [facility] [phone]   [facility] [phone]   [facility] [phone]   [facility] [phone]   [facility] [phone]   [facility] [phone]   [facility]  [facility] [phone]

## 2024-09-20 NOTE — CONSULTS
Consultation - Critical Care/ICU   Name: Santos Sandoval 59 y.o. male I MRN: [id number]  Unit/Bed#: OR POOL I Date of Admission: 9/19/2024   Date of Service: 9/20/2024 I Hospital Day: 1   Inpatient consult to Medical Critical Care  Consult performed by: Nickolas Dougherty PA-C  Consult ordered by: Lora Murdock PA-C      Physician Requesting Evaluation: Rio Menchaca MD   Reason for Evaluation / Principal Problem: Type A aortic dissection        Assessment & Plan   Impressions:  Type A aortic Dissection s/p   BRODY  HTN  Tobacco use    Neuro:   Discontinue continuous sedation.   ATC tylenol, PRN oxycodone for pain  Trend neuro exam  Delirium precautions    CV:   Goals:   Cardiac Surgery Hemodynamic Monitoring goals: MAP goal >65 CI >2.2 Continue pulmonary artery catheter for hemodynamic monitoring  Continue central line due to vasoactive medications Continue arterial line for blood pressure monitoring and/or frequent blood gas draws  Volume resuscitation as needed.   Epicardial pacing wire plan : Epicardial wires not in place  Monitor rhythm on telemetry.    SBP goal <120mmHg      Lung:   Check STAT post-op ABG and CXR  Wean vent with spontaneous breathing trial with goal to extubate today    GI:   GI prophylaxis with PPI  Bowel regimen  Zofran PRN for nausea.     FEN:   NPO Replenish K >4.0, mag >2.0 and calcium >7.0.     :   Check STAT post-op BMP  Broderick in place.   Monitor UOP with goal >0.5cc/kg/hour.  Lasix versus volume resuscitate as needed depending on hemodynamics and volume status.    ID:   Prophylactic post-op abx. Maintain normothermia. Trend temps.     Heme:   Check STAT post-op H/H and platelets.  Monitor incision site, invasive lines, and chest tube outputs for bleeding. Send coag panel if needed.    Endo:   Insulin gtt for blood sugar control.     Disposition: ICU Care     History of Present Illness   HPI: Santos Sandoval is a 59 y.o. male with a PMHx of HTN and tobacco use who presented to [facility]  with severe chest pain that radiated down into his abdomen and legs, diaphoresis, and shortness of breath. The patient was in his usual state of health until approx 2pm on 9/19 when these symptoms began and became progressively worse. The patient presented to the ER where a work up revealed a Type A dissection descending throughout his entire aorta with a complex configuration of the dissection flap where his SMA, KARL, and bilateral renal arteries were fed off of his false lumen. He was emergently transferred to [facility] where he was taken immediately to the operating room. Patient had a prolonged pump run s/s complexity of case however arrives to ICU intubated and sedated in critical but stable condition.     Intra-op blood products: x3u Cryo, x3u Platelets, x3u FFP, x6u Factor 7    CARDIOPULMONARY BYPASS TIME: 276 minutes.   CROSSCLAMP TIME: 183 minutes.  HYPOTHERMIC CIRCULATORY ARREST TIME: 60 minutes.  ANTEGRADE CEREBRAL PERFUSION TIME: 59 minutes.    Intra-op MAURILIO LVEF 50%, mild to moderate MR  Post-op medications: Critical Care Infusions : Levophed 5 mcg/min    ROS unable to be obtained due to patient being intubated and sedated.   History obtained from chart review due to patient being intubated and sedated.   Historical Information   Past Medical History:  No date: Colon polyps  No date: Hypertension Past Surgical History:  No date: COLONOSCOPY W/ BIOPSIES AND POLYPECTOMY   Current Outpatient Medications   Medication Instructions    amLODIPine-valsartan (EXFORGE) 5-160 MG per tablet 1 tablet, Oral, Daily    baclofen 10 mg, Oral, 2 times daily PRN    No Known Allergies   Social History     Tobacco Use    Smoking status: Every Day     Current packs/day: 0.50     Average packs/day: 0.5 packs/day for 45.7 years (22.9 ttl pk-yrs)     Types: Cigarettes     Start date: 1979    Smokeless tobacco: Never    Tobacco comments:     daily   Vaping Use    Vaping status: Never Used   Substance Use Topics    Alcohol use: Yes      Alcohol/week: 3.0 standard drinks of alcohol     Types: 3 Cans of beer per week     Comment: weekly    Drug use: Yes     Types: Marijuana    Family History   Family history unknown: Yes            Objective        Vitals: Invasive Monitoring      BP     Pulse     Resp     O2 Sat     O2 Device     Temp      Arterial Line  Lockhart BP    No data recorded   MAP    No data recorded     PA Catheter   Most Recent  Min/Max in 24hrs    PAP  19 No data recorded   CVP   No data recorded   CI   3.2 No data recorded   SVR   No data recorded          Physical Exam   Physical Exam  Eyes:      Pupils: Pupils are equal, round, and reactive to light.   Skin:     General: Skin is warm, dry and not mottled extremities.      Capillary Refill: Capillary refill takes less than 2 seconds.   Neck:      Vascular: Central line present. No JVD.   Cardiovascular:      Rate and Rhythm: Normal rate. Paced rhythm.      Pulses: Normal pulses.      Heart sounds: No murmur heard.     No friction rub. No gallop.   Musculoskeletal:      Right lower leg: No edema.      Left lower leg: No edema.   Abdominal: General: There is no distension.      Palpations: Abdomen is soft.      Tenderness: There is no abdominal tenderness. There is no guarding.   Constitutional:       General: He is not in acute distress.     Appearance: He is well-developed. He is not toxic-appearing.      Interventions: He is sedated and intubated.   Pulmonary:      Effort: Pulmonary effort is normal. No accessory muscle usage, respiratory distress or accessory muscle usage. He is intubated.      Breath sounds: Normal breath sounds. No wheezing or rales.      Comments: Chest tube with minimal sang output - approx 60cc    R SC cutdown site C/D/I  Neurological:      General: No focal deficit present.      Mental Status: He is calm.      Cranial Nerves: No facial asymmetry.   Genitourinary/Anorectal:  Broderick present.        Diagnostic Studies      09/20/24 CXR: Lines and tubes in good  position.   This was personally reviewed by myself in PACS.  09/20/24 EKG: NSR with a L anterior fasicular block, Qtc 503.   This was personally reviewed by myself.     Medications:  Scheduled PRN   amiodarone (CORDARONE) 150 mg/3 mL perfusion syringe, 150 mg, Once  cefazolin, 2,000 mg, Once  EPINEPHrine 10 mcg/mL in sodium chloride 0.9% 10 mL syringe,  mcg, Once  magnesium sulfate 2 g/4 mL perfusion syringe, 2 g, Once  niCARdipine 100 mcg/mL in sodium chloride 0.9% 10 mL syringe, 100-1,000 mcg, Once  phenylephrine, 200-2,000 mcg, Once  phenylephrine 10,000 mcg/20 mL perfusion syringe (500 mcg/mL) 10,000 mcg, 10,000 mcg, Once          Continuous    EPINEPHrine 5,000 mcg (STANDARD CONCENTRATION) IV in sodium chloride 0.9% 250 mL, 1-10 mcg/min  insulin regular 100 units in sodium chloride 0.9% 100 mL, 100 Units  niCARdipine (CARDENE) 25 mg (STANDARD CONCENTRATION) in sodium chloride 0.9% 250 mL, 1-15 mg/hr  phenylephrine (ADARSH-SYNEPHRINE) 50 mg (STANDARD CONCENTRATION) in sodium chloride 0.9% 250 mL,  mcg/min         Labs:  CBC  Recent Labs     09/19/24  1607 09/19/24  1925 09/20/24  0205 09/20/24  0259 09/20/24  0350 09/20/24  0353   WBC 13.89*  --   --   --   --   --    HGB 14.4   < >  --  8.5*  --  8.5*   HCT 44.2   < >  --  25*  --  25*     --  125*  --  192  --     < > = values in this interval not displayed.     BMP  Recent Labs     09/19/24  1607 09/19/24  1925 09/20/24  0259 09/20/24  0353   SODIUM 137  --   --   --    K 4.0  --   --   --      --   --   --    CO2 27   < > 28 27   AGAP 9  --   --   --    BUN 25  --   --   --    CREATININE 1.33*  --   --   --    CALCIUM 9.4  --   --   --     < > = values in this interval not displayed.       Coags  Recent Labs     09/20/24  0350   INR 0.76*   PTT 35*        Additional Electrolytes  Recent Labs     09/20/24  0259 09/20/24  0353   CAIONIZED 1.10* 1.16          Blood Gas  No recent results  No recent results LFTs  Recent Labs      09/19/24  1607   ALT 17   AST 18   ALKPHOS 50   ALB 4.2   TBILI 0.65       Infectious  No recent results     No recent results Glucose  Recent Labs     09/19/24  1607   GLUC 122        Invasive lines and devices:  Invasive Devices       Central Venous Catheter Line  Duration             CVC Central Lines 09/19/24 <1 day    Introducer 09/19/24 <1 day              Peripheral Intravenous Line  Duration             Peripheral IV 09/19/24 Left Antecubital <1 day    Peripheral IV 09/19/24 Right Antecubital <1 day    Peripheral IV 09/19/24 Right Arm <1 day              Arterial Line  Duration             Arterial Line 09/19/24 Left Radial <1 day              Drain  Duration             Chest Tube 1 Anterior;Mediastinal 32 Fr. <1 day    Chest Tube 2 Posterior;Mediastinal 32 Fr. <1 day    Urethral Catheter Latex;Temperature probe 16 Fr. <1 day              Airway  Duration             ETT  Hi-Lo;Cuffed;Oral 8.5 mm <1 day

## 2024-09-20 NOTE — ANESTHESIA PROCEDURE NOTES
Introducer/[place]    Performed by: Michael Ordaz MD  Authorized by: Michael Ordaz MD    Date/Time: 9/19/2024 7:24 PM  Consent: Verbal consent obtained. Written consent obtained.  Risks and benefits: risks, benefits and alternatives were discussed  Consent given by: patient  Patient understanding: patient states understanding of the procedure being performed  Patient consent: the patient's understanding of the procedure matches consent given  Procedure consent: procedure consent matches procedure scheduled  Relevant documents: relevant documents present and verified  Required items: required blood products, implants, devices, and special equipment available  Patient identity confirmed: arm band  Indications: vascular access and central pressure monitoring  Location details: right internal jugular  Catheter size: 9 Fr  Patient position: Trendelenburg  Anesthesia method: ga.  Assessment: blood return through all ports and free fluid flow  Preparation: skin prepped with ChloraPrep  Skin prep agent dried: skin prep agent completely dried prior to procedure  Sterile barriers: all five maximum sterile barriers used - cap, mask, sterile gown, sterile gloves, and large sterile sheet  Hand hygiene: hand hygiene performed prior to central venous catheter insertion  Ultrasound guidance: yes  ultrasound permanent image saved  Site selection rationale: aortic dissection  Number of attempts: 1  Successful placement: yes  Post-procedure: line sutured and dressing applied  Patient tolerance: patient tolerated the procedure well with no immediate complications

## 2024-09-20 NOTE — OP NOTE
OPERATIVE REPORT  PATIENT NAME: Santos Sandoval    :  1965  MRN: [id number]  Pt Location: BE OR ROOM 16    SURGERY DATE: 2024    SURGEON: Rio Menchaca MD    ASSISTANT: Lora Murdock PA-C    ADDITIONAL ASSISTANT: N/A    ANESTHESIA: General endotracheal anesthesia with transesophageal echocardiogram guidance, Dr. Michael Ordaz     PREOPERATIVE DIAGNOSIS: Type A aortic dissection    POSTOPERATIVE DIAGNOSIS: Type A aortic dissection    PROCEDURE:   1. Ascending aorta and full arch replacement with a 28 mm Gelweave graft and a  Gelweave trifurcated graft.  2. Frozen elephant trunk with a [place] TAG 34 x 34 x 150 endovascular graft.  3. Resuspension of the aortic valve.    CARDIOPULMONARY BYPASS TIME: 276 minutes.   CROSSCLAMP TIME: 183 minutes.  HYPOTHERMIC CIRCULATORY ARREST TIME: 60 minutes.  ANTEGRADE CEREBRAL PERFUSION TIME: 59 minutes.    NYHA Class: 1    CCS Class: 1    INDICATIONS:  The patient is a 59 y.o. year-old male that presented to the emergency department complaining of chest pain, a CTA of the chest abdomen and pelvis shows a type A aortic dissection with extension into bilateral iliac arteries, the tear is located in the proximal ascending aorta and extends into the proximal descending thoracic aorta. I saw the patient in consultation and recommended the procedure described previously.    FINDINGS:  1. Intraoperative transesophageal echocardiogram revealed type A aortic dissection, EF 60%, trileaflet aortic valve with trace AI, aortic root 4.2 cms, mild to moderate mitral regurgitation.  2. Type A aortic dissection, the tear was located in the proximal ascending aorta and extended into the proximal descending thoracic aorta.  There was no involvement of the aortic root.  3. Aortic valve was trileaflet  4. The left vertebral artery originated directly from the aortic arch  5. Final transesophageal echocardiogram demonstrated aortic valve with normal function, no other changes.      OPERATIVE TECHNIQUE:    The patient was taken to the operating room and placed supine on the operating table. Following the satisfactory induction of general anesthesia and placement of monitoring lines, the patient was prepped and draped in the usual sterile fashion. A time-out procedure was performed.    An incision was made 1 fingerbreadth below the right clavicle, the soft tissues were dissected out, the axillary artery was identified and controlled with vessel loops, heparinization was obtained, an arteriotomy was performed, an 8 mm Gelweave graft was anastomosed in and end-to-side fashion with 5-0 Prolene suture and connected to the arterial limb of the cardiopulmonary bypass machine.    The patient underwent a median sternotomy and pericardiotomy. The innominate vein, Innominate artery, left common carotid artery, left vertebral artery and left subclavian artery were dissected out and controlled with vessel loops. Cannulation for cardiopulmonary bypass was completed with a double stage venous cannula. Once the ACT was greater than 480 seconds we placed the patient on cardiopulmonary bypass, systemic cooling to 21.5 degrees Celsius was initiated.  After cooling down for 40 minutes and reaching a core temperature of 23 °C the ascending aorta was crossclamped, an aortotomy was performed and cardiac arrest was obtained without complications with [place] cardioplegia given directly into the coronary ostium, additional doses of cardioplegia were given as indicated. A left ventricular vent was placed through the right superior pulmonary vein. A CO2 flooded field was used during the entire case.    The aorta was transected below the crossclamp and resected down to the level of the sinotubular junction. The aortic valve was found to be trileaflet.  Once the patient had reached a core body temperature of 21.5 degrees Celsius, the patient was placed in Trendelenburg, the innominate artery was snared, hypothermic  systemic circulatory arrest with antegrade cerebral perfusion was initiated. The crossclamp was removed from the ascending aorta, inspection revealed a tear that extended through the aortic arch into the proximal descending thoracic aorta.  The innominate artery, left common carotid artery, and an island containing the left vertebral artery and the left subclavian artery were resected from the aortic arch, the aorta was resected up to the level of the proximal descending thoracic aorta.  A 28 mm Gelweave graft with a side branch was anastomosed to the proximal descending thoracic aorta in an end-to-end fashion using running 4-0 Prolene suture with felt strip reinforcing the aortic wall.  A guidewire was advanced through the aortic graft into the descending thoracic aorta and a frozen elephant trunk was performed using a [place] Tag 34 x 34 x 100 mm endovascular graft.  A second arterial limb from the cardiopulmonary bypass system was connected to the side branch of the Gelweave graft, the aorta was thoroughly de-aired and systemic body perfusion was reinitiated.  A Gelweave 12/8/8 trifurcated graft was used to reconstruct the head vessels, the innominate artery, left carotid artery and an island containing the left vertebral artery and left subclavian artery were anastomosed in an end to end fashion to the Gelweave trifurcated graft using a running suture of 5-0 Prolene.  Antegrade perfusion was resumed after each anastomosis.     Attention was turned to the aortic root. The aortic valve was resuspended with pledgeted 4-0 Prolene sutures placed at the top of the commissures and tied outside of the aorta.The aortic graft was trimmed to the appropriate size and anastomosed to the sinotubular junction in an end-to-end fashion using running 4-0 Prolene suture, the aortic root was reinforced with felt strip. A root vent was placed in the neoaorta.  The proximal segment of the Gelweave trifurcated graft was then anastomosed  to the ascending aorta graft in an end-to-side fashion with a running suture of 4-0 Prolene. The heart was extensively de-aired and the crossclamp was removed.  Following a period of reperfusion and after reaching a core temperature of 37 Celsius degree, the patient was weaned from cardiopulmonary bypass and decannulated. Protamine was administered with normalization of the ACT. Hemostasis was confirmed in all fields. Thoracostomy tubes were placed. The sternum was closed with stainless steel wires. The fascia, subdermis and skin were closed with multiple layers of running absorbable suture.  The axillary artery vascular graft was cut flush to the artery and hemoclips were placed obtaining hemostasis, the incision was closed in layers with absorbable suture once satisfied with the hemostasis .      COMPLICATIONS: None    PACKS/TUBES/DRAINS: Chest tubes x 2.     EBL: 500 cc.    TRANSFUSION: 3u PLT, 3u FFP, 3u Cryo, Factor VII.     SPECIMENS:   Ascending aorta and aortic arch.    Sponge, needle instrument counts were reported as correct by the nursing staff. As the attending surgeon, I was present and scrubbed for all critical portions of this procedure. There is no cardiac residency program at this facility and for complex procedures such as this, it is vital to have a physician assistant experienced in cardiac surgery.  The assistance of Lora Murdock PA-C was necessary for gentle retraction of the heart and aorta to facilitate mobilization of the aortic arch and aortic root, and excision of the aorta. Lora Murdock PA-C was also necessary for following of the suture with correct tension during creation of the distal and proximal anastomoses.    SIGNATURE: Rio Menchaca MD  DATE: September 20, 2024  TIME: 4:02 AM

## 2024-09-20 NOTE — ANESTHESIA POSTPROCEDURE EVALUATION
Post-Op Assessment Note    CV Status:  Stable        Airway: intubated     Post Op Vitals Reviewed: Yes    No anethesia notable event occurred.    Comments: see intra-op quick note for details of ICU hand off            BP      Temp      Pulse     Resp      SpO2

## 2024-09-20 NOTE — ANESTHESIA PROCEDURE NOTES
Procedure Performed: MAURILIO Anesthesia  Start Time:  9/19/2024 7:28 PM        Preanesthesia Checklist    Patient identified, IV assessed, risks and benefits discussed, monitors and equipment assessed, procedure being performed at surgeon's request and anesthesia consent obtained.      Procedure    Diagnostic Indications for MAURILIO:  assessment of ascending aorta, assessment of surgical repair, defect repair evaluation, hemodynamic monitoring and suspected pericardial effusion. Type of MAURILIO: complete MAURILIO with interpretation. Images Saved: ultrasound permanent image saved. Physician Requesting Echo: Rio Menchaca MD.  Location performed: OR. Intubated. Bite block not placed.   Heart visualized. Insertion of MAURILIO Probe:  Atraumatic. Probe Type:  Multiplane. Modalities:  3D, continuous wave Doppler, color flow mapping and pulse wave Doppler.      Echocardiographic and Doppler Measurements    PREPROCEDURE    LEFT VENTRICLE:  Left global ventricular function: low normal. Ejection Fraction: 50%.        RIGHT VENTRICLE:  Systolic Function: normal.  Cavity size normal.              AORTIC VALVE:  Leaflets: normal and trileaflet. Leaflet motions normal and normal. Stenosis: none. Mean Gradient: 4 mmHg.    Regurgitation: trace.      MITRAL VALVE:  Leaflets: normal. Leaflet Motions: normal. Regurgitation: mild-moderate, closer to moderate with SBP 140s, closer to mild SBP 110s; posteriorly directed jet with wall hugging, no flow reversal or significant systolic blunting in LUPV or RUPV, VC 0.2-0.3cm.   Stenosis: none.   Specific leaflet segments with abnormal motions are described in the following comments: posible leading edge prolapse of anterior leaflet, lateral.    TRICUSPID VALVE:  Leaflets: normal. Leaflet Motions: normal.  Regurgitation: trace.      PULMONIC VALVE:   Regurgitation: trace.         ASCENDING AORTA:    Dissection present.      AORTIC ARCH:    dissection present.     DESCENDING AORTA:    Dissection present.      OTHER AORTIC FINDINGS:   Aortic dissection, extends to STJ - appears stop just above RCA; large tear/communication between true and false lumens in distal arch, near left subclavian artery, flap extends down all visualized portions of descending aorta    SoV - 4.3-4.4 cm.                OTHER ATRIAL FINDINGS:  No MARILEE clot.    ATRIAL SEPTUM:  Intra-atrial septal morphology: no PFO by CFD.                      POSTPROCEDURE    LEFT VENTRICLE: Unchanged .         RIGHT VENTRICLE: Unchanged .           AORTIC VALVE:      Regurgitation: trace.      MITRAL VALVE: Unchanged .         TRICUSPID VALVE: Unchanged .                    AORTA:     OTHER AORTA FINDINGS: Tube graft in ascending aortic position; stent graft in proximal descending.    REMOVAL:  Probe Removal: atraumatic.

## 2024-09-20 NOTE — RESPIRATORY THERAPY NOTE
RT Protocol Note  Santos Sandoval 59 y.o. male MRN: [id number]  Unit/Bed#: [facility]-313-01 Encounter: [id number]    Assessment    Principal Problem:    Type 1 dissection of ascending aorta (HCC)  Active Problems:    Essential hypertension    Smoking    Hx of colonoscopy with polypectomy    S/P aortic dissection repair    Acute blood loss as cause of postoperative anemia      Home Pulmonary Medications:  NA       Past Medical History:   Diagnosis Date    Colon polyps     Hypertension      Social History     Socioeconomic History    Marital status: Unknown     Spouse name: Not on file    Number of children: Not on file    Years of education: Not on file    Highest education level: Not on file   Occupational History    Not on file   Tobacco Use    Smoking status: Every Day     Current packs/day: 0.50     Average packs/day: 0.5 packs/day for 45.7 years (22.9 ttl pk-yrs)     Types: Cigarettes     Start date: 1979    Smokeless tobacco: Never    Tobacco comments:     daily   Vaping Use    Vaping status: Never Used   Substance and Sexual Activity    Alcohol use: Yes     Alcohol/week: 3.0 standard drinks of alcohol     Types: 3 Cans of beer per week     Comment: weekly    Drug use: Yes     Types: Marijuana    Sexual activity: Not on file   Other Topics Concern    Not on file   Social History Narrative    Not on file     Social Determinants of Health     Financial Resource Strain: Not on file   Food Insecurity: Not on file   Transportation Needs: Not on file   Physical Activity: Not on file   Stress: Not on file   Social Connections: Not on file   Intimate Partner Violence: Not on file   Housing Stability: Not on file       Subjective         Objective    Physical Exam:   Assessment Type: Assess only  General Appearance: Sedated  Respiratory Pattern: Assisted  Chest Assessment: Chest expansion symmetrical  Bilateral Breath Sounds: Diminished, Clear    Vitals:  Pulse 74, temperature (!) 96.8 °F (36 °C), resp. rate 22, SpO2 99%.           Imaging and other studies: No pertinent imaging studies reviewed.          Plan    Respiratory Plan: Vent/NIV/HFNC  Airway Clearance Plan:  (Intubated will re-evaluate post extubation)     Resp Comments: (P) Pt is a 60 y/o male admitted s/p aortic dissection repair. Pt placed on vent without issues. Pt evaluated per respiratory protocol. Pt is not in any respiratory distress. Breathe sounds are diminished and clear. No home use of respiratory medications. No udns started at this time. Will follow pt per protocol

## 2024-09-20 NOTE — OCCUPATIONAL THERAPY NOTE
Occupational Therapy Cancel Note        Patient Name: Santos Sandoval  Today's Date: 9/20/2024 09/20/24 0940   OT Last Visit   OT Visit Date 09/20/24   Note Type   Note type Cancelled Session   Cancel Reasons Medical status   Additional Comments OT consult received, chart reviewed. Per RN, pt not medically appropriate for therapy at this time. WIll continue to follow as appropriate.       Yamel Sykes, ANNEMARIE, OTR/L

## 2024-09-20 NOTE — ANESTHESIA PROCEDURE NOTES
Arterial Line Insertion    Performed by: Michael Ordaz MD  Authorized by: Michael Ordaz MD  Consent: Verbal consent obtained. Written consent obtained.  Risks and benefits: risks, benefits and alternatives were discussed  Consent given by: patient  Patient understanding: patient states understanding of the procedure being performed  Patient consent: the patient's understanding of the procedure matches consent given  Procedure consent: procedure consent matches procedure scheduled  Relevant documents: relevant documents present and verified  Required items: required blood products, implants, devices, and special equipment available  Patient identity confirmed: arm band  Preparation: Patient was prepped and draped in the usual sterile fashion.  Indications: multiple ABGs and hemodynamic monitoring  Orientation:  Left  Location: radial arterylidocaine (PF) (XYLOCAINE-MPF) 1 % - Infiltration   0.5 mL - 9/19/2024 7:11:00 PM  Procedure Details:  Needle gauge: 20  Seldinger technique: Seldinger technique used  Number of attempts: 1    Post-procedure:  Post-procedure: dressing applied  Waveform: good waveform and waveform confirmed  Post-procedure CNS: normal and unchanged  Patient tolerance: patient tolerated the procedure well with no immediate complications

## 2024-09-20 NOTE — CASE MANAGEMENT
Case Management Discharge Planning Note    Patient name Santos Sandoval  Location [facility]313/[facility]-01 MRN [id number]  : 1965 Date 2024       Current Admission Date: 2024  Current Admission Diagnosis:Type 1 dissection of ascending aorta (HCC)   Patient Active Problem List    Diagnosis Date Noted Date Diagnosed    Acute blood loss as cause of postoperative anemia 2024     Type 1 dissection of ascending aorta (HCC) 2024     S/P aortic dissection repair 2024     Neck muscle spasm 2023     Gross hematuria 06/15/2023     Essential hypertension 10/18/2021     Smoking 10/18/2021     Hx of colonoscopy with polypectomy 10/18/2021       LOS (days): 1  Geometric Mean LOS (GMLOS) (days):   Days to GMLOS:     OBJECTIVE:  Risk of Unplanned Readmission Score: 11.47       Current admission status: Inpatient   Preferred Pharmacy:   [address] [address]  Phone: [phone] Fax: [phone]    Primary Care Provider: Sheldon Moreira MD    Primary Insurance: [facility]  Secondary Insurance:     DISCHARGE DETAILS:  Pt extubated this morning.  Dissection of ascending aorta s/p repair.  CM attempted to speak to pt at bedside and obtain open assessment.  Pt was sleeping and did not respond to name being called.  CM will follow up tomorrow for discharge planning.

## 2024-09-20 NOTE — PHYSICAL THERAPY NOTE
Physical Therapy Cancellation Note       09/20/24 0941   PT Last Visit   PT Visit Date 09/20/24   Note Type   Note type Cancelled Session   Cancel Reasons Medical status         PT consult received and chart reviewed. Per RN, pt not medically appropriate for therapy at this time. WIll continue to follow as appropriate.     Go Gilbert PT, DPT

## 2024-09-20 NOTE — PROGRESS NOTES
Progress Note - Cardiac Surgery   Santos Sandoval 59 y.o. male MRN: [id number]  Unit/Bed#: PPHP-313-01 Encounter: [id number]    Aortic dissection. S/P ascending aorta and full arch replacement/frozen elephant trunk with endovascular graft/resuspension of the aortic valve.; POD # 0    24 Hour Events: Epi, Primaocr, Vaso, and Levo have been weaned off. Remains intubated and supported with Cardene. Postoperatively has woken up and followed commands. Neurovascularly intact with signals to all distal extremities.      Medications:   Scheduled Meds:  Current Facility-Administered Medications   Medication Dose Route Frequency Provider Last Rate    acetaminophen  650 mg Rectal Q4H PRN Lora Murdock PA-C      acetaminophen  975 mg Oral Q6H While awake Lora Murdock PA-C      amiodarone  200 mg Oral Q8H [facility] Lora Murdock PA-C      aspirin  325 mg Oral Daily Lora Murdock PA-C      atorvastatin  80 mg Oral Daily With Dinner Lora Murdock PA-C      bisacodyl  10 mg Rectal Daily PRN Lora Murdock PA-C      calcium gluconate  2 g Intravenous Once PRN Lora Murdock PA-C      cefazolin  2,000 mg Intravenous Q8H Lora Murdock PA-C      chlorhexidine  15 mL Mouth/Throat BID Lora Murdock PA-C      EPINEPHrine 5,000 mcg (STANDARD CONCENTRATION) IV in sodium chloride 0.9% 250 mL  1-10 mcg/min Intravenous Titrated Rio Menchaca MD      fentaNYL  50 mcg Intravenous Once Lora Murdock PA-C      fentaNYL  50 mcg Intravenous Q1H PRN Lora Murdock PA-C      furosemide  40 mg Intravenous BID (diuretic) Orlin Byrd PA-C      heparin (porcine)  5,000 Units Subcutaneous Q8H [facility] Orlin Byrd PA-C      HYDROmorphone  0.5 mg Intravenous Q2H PRN Lora Murdock PA-C      insulin regular (HumuLIN R,NovoLIN R) 1 Units/mL in sodium chloride 0.9 % 100 mL infusion  0.3-21 Units/hr Intravenous Titrated Lora Murdock PA-C 1.5 Units/hr (09/20/24 0610)    insulin regular 100  units in sodium chloride 0.9% 100 mL  100 Units Intravenous Titrated Rio Menchaca MD      lactated ringers  500 mL Intravenous Once PRN Lora Murdock PA-C      metoprolol tartrate  25 mg Per NG Tube Q12H [facility] Orlin Byrd PA-C      mupirocin  1 Application Nasal Q12H [facility] Lora Murdock PA-C      niCARdipine (CARDENE) 25 mg (STANDARD CONCENTRATION) in sodium chloride 0.9% 250 mL  1-15 mg/hr Intravenous Titrated Rio Menchaca MD      niCARdipine  2.5-15 mg/hr Intravenous Titrated Lora Murdock PA-C 2.5 mg/hr (09/20/24 0606)    norepinephrine  1-30 mcg/min Intravenous Titrated Nickolas Dougherty PA-C      ondansetron  4 mg Intravenous Q6H PRN Lora Murdock PA-C      oxyCODONE  2.5 mg Oral Q4H PRN Lora Murdock PA-C      Or    oxyCODONE  5 mg Oral Q4H PRN Lora Murdock PA-C      pantoprazole  40 mg Oral Early Morning Lora Murdock PA-C      phenylephrine (ADARSH-SYNEPHRINE) 50 mg (STANDARD CONCENTRATION) in sodium chloride 0.9% 250 mL   mcg/min Intravenous Titrated Rio Menchaca MD      phenylephine   mcg/min Intravenous Titrated Lora Murdock PA-C      polyethylene glycol  17 g Oral Daily Lora Murdock PA-C      potassium chloride  20 mEq Intravenous Once PRN Lora Murdock PA-C      potassium chloride  40 mEq Intravenous Once PRN Lora Murdock PA-C      potassium chloride  40 mEq Intravenous Once PRN Lora Murdock PA-C      sodium chloride  20 mL/hr Intravenous Continuous Lora Murdock PA-C 20 mL/hr (09/20/24 0437)     Continuous Infusions:EPINEPHrine 5,000 mcg (STANDARD CONCENTRATION) IV in sodium chloride 0.9% 250 mL, 1-10 mcg/min  insulin regular (HumuLIN R,NovoLIN R) 1 Units/mL in sodium chloride 0.9 % 100 mL infusion, 0.3-21 Units/hr, Last Rate: 1.5 Units/hr (09/20/24 0610)  insulin regular 100 units in sodium chloride 0.9% 100 mL, 100 Units  niCARdipine (CARDENE) 25 mg (STANDARD CONCENTRATION) in sodium chloride 0.9% 250  mL, 1-15 mg/hr  niCARdipine, 2.5-15 mg/hr, Last Rate: 2.5 mg/hr (09/20/24 0606)  norepinephrine, 1-30 mcg/min  phenylephrine (ADARSH-SYNEPHRINE) 50 mg (STANDARD CONCENTRATION) in sodium chloride 0.9% 250 mL,  mcg/min  phenylephine,  mcg/min  sodium chloride, 20 mL/hr, Last Rate: 20 mL/hr (09/20/24 0437)      PRN Meds:.  acetaminophen    bisacodyl    calcium gluconate    fentaNYL    HYDROmorphone    lactated ringers    ondansetron    oxyCODONE **OR** oxyCODONE    potassium chloride    potassium chloride    potassium chloride    Vitals:   Vitals:    09/20/24 0500 09/20/24 0600 09/20/24 0700 09/20/24 0705   Pulse: 75 75 85 80   Resp: (!) 23 13 20 (!) 10   Temp: (!) 96.8 °F (36 °C) (!) 96.4 °F (35.8 °C)  97.7 °F (36.5 °C)   TempSrc:    Core   SpO2: 99% 96% 99% 98%   Weight:           Hemodynamics:  PAP: (18-27)/(8-14) 24/14  CVP:  [4 mmHg] 4 mmHg  CO:  [5.1 L/min-6.4 L/min] 5.1 L/min  CI:  [2.5 L/min/m2-3.2 L/min/m2] 2.5 L/min/m2    Respiratory:   SpO2: SpO2: 98 %, SpO2 Activity: SpO2 Activity: At Rest; Full vent support    Intake/Output:   I/O         09/18 0701 09/19 0700 09/19 0701 09/20 0700    P.O.  0    I.V.  4991.5    Blood  1575    NG/GT  30    IV Piggyback  800    Cell Saver  2429    Total Intake  9825.5    Urine  2800    Emesis/NG output  0    Blood  1500    Chest Tube  110    Total Output  4410    Net  +5415.5                  Weights:   Weight (last 2 days)       Date/Time Weight    09/20/24 0426 88.1 (194.23)          Chest tube Output:    Mediastinal tubes: 110 mL/8 hours  110 mL/24 hours   `       Results:   Results from last 7 days   Lab Units 09/20/24  0432 09/20/24  0353 09/20/24  0350 09/20/24  0259 09/20/24  0205 09/19/24  1925 09/19/24  1607   WBC Thousand/uL  --   --   --   --   --   --  13.89*   HEMOGLOBIN g/dL  --   --   --   --   --   --  14.4   I STAT HEMOGLOBIN g/dl 7.5* 8.5*  --  8.5*  --    < >  --    HEMATOCRIT %  --   --   --   --   --   --  44.2   HEMATOCRIT, ISTAT % 22* 25*   --  25*  --    < >  --    PLATELETS Thousands/uL  --   --  192  --  125*  --  240    < > = values in this interval not displayed.     Results from last 7 days   Lab Units 09/20/24  0701 09/20/24  0432 09/20/24  0353 09/19/24  1925 09/19/24  1607   SODIUM mmol/L 145  --   --   --  137   POTASSIUM mmol/L 3.9  --   --   --  4.0   CHLORIDE mmol/L 109*  --   --   --  101   CO2 mmol/L 27  --   --   --  27   CO2, I-STAT mmol/L  --  28 27   < >  --    BUN mg/dL 20  --   --   --  25   CREATININE mg/dL 1.22  --   --   --  1.33*   GLUCOSE, ISTAT mg/dl  --  171* 211*   < >  --    CALCIUM mg/dL 8.6  --   --   --  9.4    < > = values in this interval not displayed.     Recent Labs     09/20/24  0701   MG 3.0*     Results from last 7 days   Lab Units 09/20/24  0712 09/20/24  0350   INR  0.70* 0.76*   PTT seconds 31 35*       Point of care glucose: 120-128    Studies:    CXR: No effusion. No PTX.         EKG: NSR. No ischemic changes.     Reviewed radiology reports from this admission including: ECG report and CXR image.    Invasive Lines/Tubes:  Invasive Devices       Central Venous Catheter Line  Duration             CVC Central Lines 09/19/24 <1 day    Introducer 09/19/24 <1 day              Peripheral Intravenous Line  Duration             Peripheral IV 09/19/24 Left Antecubital <1 day    Peripheral IV 09/19/24 Right Antecubital <1 day    Peripheral IV 09/19/24 Right Arm <1 day              Arterial Line  Duration             Arterial Line 09/19/24 Left Radial <1 day              Drain  Duration             Chest Tube 1 Anterior;Mediastinal 32 Fr. <1 day    Chest Tube 2 Posterior;Mediastinal 32 Fr. <1 day    NG/OG/Enteral Tube Orogastric Center mouth <1 day    Urethral Catheter Latex;Temperature probe 16 Fr. <1 day              Airway  Duration             ETT  Hi-Lo;Cuffed;Oral 8.5 mm <1 day                    Physical Exam:    General: No acute distress, Alert, and Normal appearance  HEENT/NECK:  Normocephalic. Atraumatic.   No jugular venous distention.    Cardiac: Regular rate and rhythm and No murmurs/rubs/gallops  Pulmonary:  Breath sounds clear bilaterally and No rales/rhonchi/wheezes  Abdomen:  Non-tender, Non-distended, and Normal bowel sounds  Incisions: Sternum is stable.  Incision dressed with Acticoat.  No erythema or drainage  Extremities: Extremities warm/dry  Neuro: Alert and oriented X 3 and Sensation is grossly intact  Skin: Warm/Dry, without rashes or lesions.    Assessment:  Principal Problem:    Type 1 dissection of ascending aorta (HCC)  Active Problems:    Essential hypertension    Smoking    Hx of colonoscopy with polypectomy    S/P aortic dissection repair    Acute blood loss as cause of postoperative anemia       Aortic dissection. S/P ascending aorta and full arch replacement/frozen elephant trunk with endovascular graft/resuspension of the aortic valve.; POD # 0    .  Plan:    Cardiac:     Normal ventricular systolic function, EF 50%    Cardiac infusions: Cardene, 2.5 mg/hour; Wean off    Cardiac index > 2.2  D/C [place] Nini catheter  Maintain Arterial line    NSR; Hypertensive  Start Lopressor, 12.5 mg PO BID via OGT      Continue prophylactic Amiodarone, 200 mg PO TID    Continue ASA and Statin therapy    Maintain epicardial pacing wires for beta blocker initiation    Maintain central IV access today for lack of peripheral access    Continue Subcutaneous Heparin for DVT prophylaxis    Consult cardiology for postoperative medical management    Pulmonary:     Extubated    CXR findings: Clear    Wean ventilator towards extubation    Chest tube output remains persistently high; Continue chest tubes to suction today    Renal:     Normal preoperative renal function  Baseline creatinine 0.94  Creatinine 1.22 today, from 1.33  BMP ordered for tomorrow    Intake/Output net: + 5400 mL/24 hours    Diuretic Regimen:  Start IV Lasix, 40 mg BID  Start Potassium Chloride 20 mEq PO BID    Discontinue potassium replacement  scales    Maintain jacobson catheter for today    Neuro:   Wakes and follows commands   Wean sedation and wean ventilator      GI:   Advance diet following extubation    Continue stool softeners and prn suppository    Continue GI prophylaxis    Endo:   Continue insulin infusion today    Hematology:     Post-operative acute blood loss anemia; Hemoglobin 7.5; trend prn    Disposition:    Maintain ICU level of care for today      VTE Pharmacologic Prophylaxis: VTE covered by:  heparin (porcine), Subcutaneous     and Heparin  VTE Mechanical Prophylaxis: sequential compression device    Collaborative rounds completed with supervising physician and with the bedside nurse    SIGNATURE: Orlin Byrd PA-C  DATE: September 20, 2024  TIME: 7:22 AM

## 2024-09-20 NOTE — RESPIRATORY THERAPY NOTE
Resp Therapy   09/20/24 0757   Respiratory Assessment   Resp Comments Pt was placed on SPONT by PA, told by PA to extubate pt per order. Pt extubated to nasal cannula without issues, pt able to speak and no stridor heard, will continue to monitor pt per resp protocol.   Vent Information   Vent type Ha C3   Ha Vent Mode SPONT   Ventilator End Yes   Daily Screen   Patient safety screen outcome: Passed   Spont breathing trial outcome: Passed   Name of Medical Team Notified: Crticial Care PA   Preparing to extubate/ Notify Nurse Yes   Extubation order obtained Yes   Patient extubated Yes   IHI Ventilator Associated Pneumonia Bundle   Daily Assessment of Readiness to Extubate Yes   Head of Bed Elevated HOB 30

## 2024-09-20 NOTE — ANESTHESIA PROCEDURE NOTES
Central Line Insertion    Performed by: Michael Ordaz MD  Authorized by: Michael Ordaz MD    Date/Time: 9/19/2024 7:23 PM  Catheter Type:  triple lumen  Consent: Verbal consent obtained. Written consent obtained.  Risks and benefits: risks, benefits and alternatives were discussed  Consent given by: patient  Patient understanding: patient states understanding of the procedure being performed  Patient consent: the patient's understanding of the procedure matches consent given  Procedure consent: procedure consent matches procedure scheduled  Relevant documents: relevant documents present and verified  Required items: required blood products, implants, devices, and special equipment available  Patient identity confirmed: arm band  Indications: vascular access and central pressure monitoring  Catheter size: 7 Fr  Patient position: Trendelenburg  Anesthesia method: ga.  Assessment: blood return through all ports and free fluid flow  Preparation: skin prepped with ChloraPrep  Skin prep agent dried: skin prep agent completely dried prior to procedure  Sterile barriers: all five maximum sterile barriers used - cap, mask, sterile gown, sterile gloves, and large sterile sheet  Hand hygiene: hand hygiene performed prior to central venous catheter insertion  sterile gel and probe cover used in ultrasound-guided central venous catheter insertionVessel of Catheter Tip End: central venous  Number of attempts: 1  Successful placement: yes  Post-procedure: chlorhexidine patch applied, line sutured and dressing applied  Patient tolerance: patient tolerated the procedure well with no immediate complications

## 2024-09-21 LAB
ANION GAP SERPL CALCULATED.3IONS-SCNC: 7 MMOL/L (ref 4–13)
BUN SERPL-MCNC: 15 MG/DL (ref 5–25)
CALCIUM SERPL-MCNC: 7.8 MG/DL (ref 8.4–10.2)
CHLORIDE SERPL-SCNC: 101 MMOL/L (ref 96–108)
CO2 SERPL-SCNC: 27 MMOL/L (ref 21–32)
CREAT SERPL-MCNC: 0.9 MG/DL (ref 0.6–1.3)
ERYTHROCYTE [DISTWIDTH] IN BLOOD BY AUTOMATED COUNT: 13.4 % (ref 11.6–15.1)
GFR SERPL CREATININE-BSD FRML MDRD: 91 ML/MIN/1.73SQ M
GLUCOSE SERPL-MCNC: 102 MG/DL (ref 65–140)
GLUCOSE SERPL-MCNC: 108 MG/DL (ref 65–140)
GLUCOSE SERPL-MCNC: 116 MG/DL (ref 65–140)
GLUCOSE SERPL-MCNC: 118 MG/DL (ref 65–140)
GLUCOSE SERPL-MCNC: 119 MG/DL (ref 65–140)
GLUCOSE SERPL-MCNC: 124 MG/DL (ref 65–140)
GLUCOSE SERPL-MCNC: 126 MG/DL (ref 65–140)
GLUCOSE SERPL-MCNC: 127 MG/DL (ref 65–140)
GLUCOSE SERPL-MCNC: 135 MG/DL (ref 65–140)
GLUCOSE SERPL-MCNC: 148 MG/DL (ref 65–140)
HCT VFR BLD AUTO: 29.6 % (ref 36.5–49.3)
HGB BLD-MCNC: 9.6 G/DL (ref 12–17)
INR PPP: 1.2 (ref 0.85–1.19)
MAGNESIUM SERPL-MCNC: 2.2 MG/DL (ref 1.9–2.7)
MCH RBC QN AUTO: 29 PG (ref 26.8–34.3)
MCHC RBC AUTO-ENTMCNC: 32.4 G/DL (ref 31.4–37.4)
MCV RBC AUTO: 89 FL (ref 82–98)
PLATELET # BLD AUTO: 161 THOUSANDS/UL (ref 149–390)
PMV BLD AUTO: 9.4 FL (ref 8.9–12.7)
POTASSIUM SERPL-SCNC: 4.3 MMOL/L (ref 3.5–5.3)
PROTHROMBIN TIME: 15.4 SECONDS (ref 12.3–15)
RBC # BLD AUTO: 3.31 MILLION/UL (ref 3.88–5.62)
SODIUM SERPL-SCNC: 135 MMOL/L (ref 135–147)
WBC # BLD AUTO: 15.38 THOUSAND/UL (ref 4.31–10.16)

## 2024-09-21 PROCEDURE — 85610 PROTHROMBIN TIME: CPT | Performed by: PHYSICIAN ASSISTANT

## 2024-09-21 PROCEDURE — 82948 REAGENT STRIP/BLOOD GLUCOSE: CPT

## 2024-09-21 PROCEDURE — 97163 PT EVAL HIGH COMPLEX 45 MIN: CPT

## 2024-09-21 PROCEDURE — 94760 N-INVAS EAR/PLS OXIMETRY 1: CPT

## 2024-09-21 PROCEDURE — 85027 COMPLETE CBC AUTOMATED: CPT | Performed by: PHYSICIAN ASSISTANT

## 2024-09-21 PROCEDURE — 83735 ASSAY OF MAGNESIUM: CPT | Performed by: PHYSICIAN ASSISTANT

## 2024-09-21 PROCEDURE — 97167 OT EVAL HIGH COMPLEX 60 MIN: CPT

## 2024-09-21 PROCEDURE — 94664 DEMO&/EVAL PT USE INHALER: CPT

## 2024-09-21 PROCEDURE — 80048 BASIC METABOLIC PNL TOTAL CA: CPT | Performed by: PHYSICIAN ASSISTANT

## 2024-09-21 PROCEDURE — 99024 POSTOP FOLLOW-UP VISIT: CPT | Performed by: THORACIC SURGERY (CARDIOTHORACIC VASCULAR SURGERY)

## 2024-09-21 RX ORDER — HYDRALAZINE HYDROCHLORIDE 20 MG/ML
10 INJECTION INTRAMUSCULAR; INTRAVENOUS ONCE
Status: COMPLETED | OUTPATIENT
Start: 2024-09-21 | End: 2024-09-21

## 2024-09-21 RX ORDER — INSULIN LISPRO 100 [IU]/ML
1-6 INJECTION, SOLUTION INTRAVENOUS; SUBCUTANEOUS
Status: DISCONTINUED | OUTPATIENT
Start: 2024-09-21 | End: 2024-09-24 | Stop reason: HOSPADM

## 2024-09-21 RX ORDER — FUROSEMIDE 10 MG/ML
40 INJECTION INTRAMUSCULAR; INTRAVENOUS DAILY
Status: DISCONTINUED | OUTPATIENT
Start: 2024-09-21 | End: 2024-09-22

## 2024-09-21 RX ORDER — DOCUSATE SODIUM 100 MG/1
100 CAPSULE, LIQUID FILLED ORAL 2 TIMES DAILY
Status: DISCONTINUED | OUTPATIENT
Start: 2024-09-21 | End: 2024-09-24 | Stop reason: HOSPADM

## 2024-09-21 RX ORDER — LISINOPRIL 5 MG/1
5 TABLET ORAL DAILY
Status: DISCONTINUED | OUTPATIENT
Start: 2024-09-21 | End: 2024-09-22

## 2024-09-21 RX ORDER — MAGNESIUM SULFATE HEPTAHYDRATE 40 MG/ML
2 INJECTION, SOLUTION INTRAVENOUS ONCE
Status: COMPLETED | OUTPATIENT
Start: 2024-09-21 | End: 2024-09-21

## 2024-09-21 RX ORDER — TEMAZEPAM 15 MG/1
15 CAPSULE ORAL
Status: DISCONTINUED | OUTPATIENT
Start: 2024-09-21 | End: 2024-09-24 | Stop reason: HOSPADM

## 2024-09-21 RX ORDER — ALBUMIN, HUMAN INJ 5% 5 %
12.5 SOLUTION INTRAVENOUS ONCE
Status: COMPLETED | OUTPATIENT
Start: 2024-09-21 | End: 2024-09-21

## 2024-09-21 RX ORDER — POTASSIUM CHLORIDE 1500 MG/1
20 TABLET, EXTENDED RELEASE ORAL DAILY
Status: DISCONTINUED | OUTPATIENT
Start: 2024-09-21 | End: 2024-09-22

## 2024-09-21 RX ADMIN — HEPARIN SODIUM 5000 UNITS: 5000 INJECTION INTRAVENOUS; SUBCUTANEOUS at 21:12

## 2024-09-21 RX ADMIN — OXYCODONE HYDROCHLORIDE 2.5 MG: 5 TABLET ORAL at 20:27

## 2024-09-21 RX ADMIN — AMIODARONE HYDROCHLORIDE 200 MG: 200 TABLET ORAL at 06:11

## 2024-09-21 RX ADMIN — DOCUSATE SODIUM 100 MG: 100 CAPSULE, LIQUID FILLED ORAL at 17:50

## 2024-09-21 RX ADMIN — ACETAMINOPHEN 975 MG: 325 TABLET ORAL at 08:43

## 2024-09-21 RX ADMIN — AMIODARONE HYDROCHLORIDE 200 MG: 200 TABLET ORAL at 21:12

## 2024-09-21 RX ADMIN — HEPARIN SODIUM 5000 UNITS: 5000 INJECTION INTRAVENOUS; SUBCUTANEOUS at 06:11

## 2024-09-21 RX ADMIN — HEPARIN SODIUM 5000 UNITS: 5000 INJECTION INTRAVENOUS; SUBCUTANEOUS at 13:42

## 2024-09-21 RX ADMIN — POLYETHYLENE GLYCOL 3350 17 G: 17 POWDER, FOR SOLUTION ORAL at 08:43

## 2024-09-21 RX ADMIN — OXYCODONE HYDROCHLORIDE 5 MG: 5 TABLET ORAL at 17:50

## 2024-09-21 RX ADMIN — Medication 1 TABLET: at 08:42

## 2024-09-21 RX ADMIN — OXYCODONE HYDROCHLORIDE 5 MG: 5 TABLET ORAL at 23:54

## 2024-09-21 RX ADMIN — FOLIC ACID 1 MG: 1 TABLET ORAL at 08:43

## 2024-09-21 RX ADMIN — NICARDIPINE HYDROCHLORIDE 2.5 MG/HR: 2.5 INJECTION, SOLUTION INTRAVENOUS at 04:15

## 2024-09-21 RX ADMIN — ALBUMIN (HUMAN) 12.5 G: 12.5 INJECTION, SOLUTION INTRAVENOUS at 02:35

## 2024-09-21 RX ADMIN — ATORVASTATIN CALCIUM 80 MG: 80 TABLET, FILM COATED ORAL at 17:50

## 2024-09-21 RX ADMIN — MUPIROCIN 1 APPLICATION: 20 OINTMENT TOPICAL at 08:42

## 2024-09-21 RX ADMIN — OXYCODONE HYDROCHLORIDE 5 MG: 5 TABLET ORAL at 08:53

## 2024-09-21 RX ADMIN — POTASSIUM CHLORIDE 20 MEQ: 1500 TABLET, EXTENDED RELEASE ORAL at 10:38

## 2024-09-21 RX ADMIN — ACETAMINOPHEN 975 MG: 325 TABLET ORAL at 20:26

## 2024-09-21 RX ADMIN — ACETAMINOPHEN 975 MG: 325 TABLET ORAL at 13:42

## 2024-09-21 RX ADMIN — MUPIROCIN 1 APPLICATION: 20 OINTMENT TOPICAL at 20:42

## 2024-09-21 RX ADMIN — FUROSEMIDE 40 MG: 10 INJECTION, SOLUTION INTRAMUSCULAR; INTRAVENOUS at 10:38

## 2024-09-21 RX ADMIN — PANTOPRAZOLE SODIUM 40 MG: 40 TABLET, DELAYED RELEASE ORAL at 06:11

## 2024-09-21 RX ADMIN — MAGNESIUM SULFATE HEPTAHYDRATE 2 G: 40 INJECTION, SOLUTION INTRAVENOUS at 10:38

## 2024-09-21 RX ADMIN — ASPIRIN 325 MG ORAL TABLET 325 MG: 325 PILL ORAL at 08:43

## 2024-09-21 RX ADMIN — AMIODARONE HYDROCHLORIDE 200 MG: 200 TABLET ORAL at 13:42

## 2024-09-21 RX ADMIN — CEFAZOLIN SODIUM 2000 MG: 2 SOLUTION INTRAVENOUS at 02:07

## 2024-09-21 RX ADMIN — METOPROLOL TARTRATE 25 MG: 25 TABLET, FILM COATED ORAL at 08:43

## 2024-09-21 RX ADMIN — SODIUM CHLORIDE 1.5 UNITS/HR: 9 INJECTION, SOLUTION INTRAVENOUS at 02:35

## 2024-09-21 RX ADMIN — CHLORHEXIDINE GLUCONATE 0.12% ORAL RINSE 15 ML: 1.2 LIQUID ORAL at 08:42

## 2024-09-21 RX ADMIN — THIAMINE HCL TAB 100 MG 100 MG: 100 TAB at 08:42

## 2024-09-21 RX ADMIN — METOPROLOL TARTRATE 25 MG: 25 TABLET, FILM COATED ORAL at 20:28

## 2024-09-21 RX ADMIN — LISINOPRIL 5 MG: 5 TABLET ORAL at 10:55

## 2024-09-21 RX ADMIN — HYDRALAZINE HYDROCHLORIDE 10 MG: 20 INJECTION INTRAMUSCULAR; INTRAVENOUS at 10:55

## 2024-09-21 NOTE — PLAN OF CARE
Problem: OCCUPATIONAL THERAPY ADULT  Goal: Performs self-care activities at highest level of function for planned discharge setting.  See evaluation for individualized goals.  Description: Treatment Interventions: ADL retraining, Functional transfer training, UE strengthening/ROM, Endurance training, Cognitive reorientation, Patient/family training, Equipment evaluation/education, Compensatory technique education, Continued evaluation, Activityengagement, Energy conservation, Cardiac education  Equipment Recommended: Shower/Tub chair with back ($)       See flowsheet documentation for full assessment, interventions and recommendations.   Note: Limitation: Decreased ADL status, Decreased Safe judgement during ADL, Decreased cognition, Decreased endurance, Decreased high-level ADLs, Decreased self-care trans  Prognosis: Good  Assessment: Pt is a 59 year old male seen for initial OT evaluation s/p admission to [facility] 9/20/24 with type A aortic dissection s/p ascending aorta for arch replacement with gelweave graft, resuspension, or aortic valve.  Additional active problems include: HTN, BRODY and tobacco abuse.  Pt resides with a roommate in half of a double with 2STE.  Roommate is not able to provide assist.  Pt reports being completely independent with ADLs, IADLs, and functional mobility without use of DME PTA.  Pt is a  and works FT in a [employer].  Pt is currently demonstrating the following occupational deficits: ADLs with Devi, functional transfers with Devi, and functional mobility with Devi with use of rw.  Factors currently limiting pt's independence in these areas include: generalized weakness, pain, endurance, inaccessible home environment, limited support in home environment, cognitive deficits, functional mobility.  From an OT standpoint, anticipate that pt will progress to d/c with level III rehab resources and use of sc.  Pt is to continue to benefit from skilled occupational therapy services while  in the hospital to maximize functioning and independence with daily activities.  See below for OT goals to be addressed 3-5x/wk.     Rehab Resource Intensity Level, OT: III (Minimum Resource Intensity)

## 2024-09-21 NOTE — PROGRESS NOTES
Progress Note - Critical Care/ICU   Name: Santos Sandoval 59 y.o. male I MRN: [id number]  Unit/Bed#: PPHP-313-01 I Date of Admission: 9/19/2024   Date of Service: 9/21/2024 I Hospital Day: 2      Assessment & Plan   Impressions:  Type a aortic dissection s/p ascending aorta for arch replacement with Gelweave graft, resuspension of aortic valve  Hypertension  Acute kidney injury-resolved  Tobacco abuse    Neuro:   Cardiac Surgery Pain Control: ATC tylenol and PRN oxycodone 2.5/5mg  Delirium precautions  Regulate sleep/wake cycle  CAM-ICU daily  Trend neuro exam    CV:   Cardiac Surgery Hemodynamic Monitoring: MAP goal >65 CI >2.2 Continue central line due to vasoactive medications Continue arterial line for blood pressure monitoring and/or frequent blood gas draws Discontinue PA catheter  Follow rhythm on telemetry  Critical Care Infusions : Cardene 2.5 mg/hour  Beta Blocker Plan: Start Lopressor 25 mg BID  Epicardial pacing wire plan : Epicardial pacing wires in place. Will pace as needed for bradycardia or cardiac output   Post op cardiac surgery medications:    mg QD  Statin: Lipitor 80 mg qHS  Amiodarone 200 mg PO q8 hours     Lung:   Good room air saturation   Chest tube output remains persistently high; Continue chest tubes to suction today    GI:   Continue PPI for stress ulcer prophylaxis  Continue bowel regimen  Trend abdominal exam and bowel function    FEN:   Diuresis Plan: Lasix 40mg IV Daily  Nutrition plan: as tolerated  Replenish electrolytes with goals: K >4.0, Mag >2.0, and Phos >3.0    :   Broderick Plan: Continue for accurate I/O monitoring for 48 hours  Trend UOP and BUN/creat  Strict I and O     ID:   Trend temps and WBC count  Maintain normothermia    Heme:   Trend hgb and plts    Endo:   Transition to SSI     MSK/Skin:  Mobility goal:   PT/OT consult as medically appropriate   Frequent turning and pressure off-loading  Local wound care as needed    Disposition:  Med Surg with  Telemetry    ICU Core Measures     A: Assess, Prevent, and Manage Pain Has pain been assessed? Yes  Need for changes to pain regimen? No   B: Both SAT/SAT  N/A   C: Choice of Sedation RASS Goal: 0 Alert and Calm  Need for changes to sedation or analgesia regimen? No   D: Delirium CAM-ICU: Negative   E: Early Mobility  Plan for early mobility? Yes   F: Family Engagement Plan for family engagement today? Yes       Review of Invasive Devices:    Broderick Plan: Broderick to be removed. Order has been placed  Central access plan: Patient has multiple central venous catheters.       Prophylaxis:  VTE VTE covered by:  heparin (porcine), Subcutaneous, 5,000 Units at 09/21/24 0611       Stress Ulcer  covered bypantoprazole (PROTONIX) EC tablet 40 mg [[id number]]          24 Hour Events      24 Hour Events/HPI: POD # 1 s/p ascending aorta and full arch replacement, resuspension on of aortic valve.  Patient arrived to the ICU yesterday morning on levo 5 quickly transitioned to Cardene.  Was initiated on Lopressor 25 last evening and Cardene was weaned to 2.5. Delined This morning with adequate cardiac index.  Critical Care Infusions : Cardene 2.5 mg/hour  Subjective   Review of Systems: See HPI for Review of Systems  Objective     Medications:  Scheduled Continuous   acetaminophen, 975 mg, Q6H While awake  amiodarone, 200 mg, Q8H [facility]  aspirin, 325 mg, Daily  atorvastatin, 80 mg, Daily With Dinner  chlorhexidine, 15 mL, BID  folic acid, 1 mg, Daily  heparin (porcine), 5,000 Units, Q8H [facility]  metoprolol tartrate, 25 mg, Q12H [facility]  multivitamin-minerals, 1 tablet, Daily  mupirocin, 1 Application, Q12H [facility]  pantoprazole, 40 mg, Early Morning  polyethylene glycol, 17 g, Daily  thiamine, 100 mg, Daily      insulin regular (HumuLIN R,NovoLIN R) 1 Units/mL in sodium chloride 0.9 % 100 mL infusion, 0.3-21 Units/hr, Last Rate: 1.5 Units/hr (09/21/24 0235)  niCARdipine, 2.5-15 mg/hr, Last Rate: 2.5 mg/hr (09/21/24 2285)  sodium chloride, 20 mL/hr,  Last Rate: 20 mL/hr (09/20/24 0437)         Vitals: Invasive Monitoring       Most Recent Min/Max in 24hrs   Temp 98.4 °F (36.9 °C) Temp  Min: 96.4 °F (35.8 °C)  Max: 98.4 °F (36.9 °C)   Pulse 68 Pulse  Min: 60  Max: 85   Resp (!) 26 Resp  Min: 14  Max: 28   /65 BP  Min: 104/62  Max: 145/77   O2 Sat 98 % SpO2  Min: 87 %  Max: 98 %   O2 Device: Nasal cannula  Nasal Cannula O2 Flow Rate (L/min): 2 L/min Arterial Line  Maisha /62  Arterial Line BP  Min: 124/62  Max: 124/62   MAP 91 mmHg  Arterial Line MAP (mmHg)  Min: 81 mmHg  Max: 91 mmHg     PA Catheter   Most Recent  Min/Max in 24hrs    PAP 26/10 PAP  Min: 18/8  Max: 27/9   CVP 9 mmHg CVP (mean)  Min: 4 mmHg  Max: 11 mmHg   CI 2.2 L/min/m2  CI (L/min/m2)  Min: 2 L/min/m2  Max: 3.2 L/min/m2   SVR 1517 (dyne*sec)/cm5 SVR (dyne*sec)/cm5  Min: 664 (dyne*sec)/cm5  Max: 1534 (dyne*sec)/cm5        I/O Chest tube output (if present)     Intake/Output Summary (Last 24 hours) at 9/21/2024 0735  Last data filed at 9/21/2024 0600  Gross per 24 hour   Intake 3122.45 ml   Output 5300 ml   Net -2177.55 ml     UOP: 140/hour    BM: No BM in the last 24 hours  Weight change: -2.7 kg (-5 lb 15.2 oz)     Mediastinal tubes:  180 mL/8hr  340 mL/24hr             Physical Exam   Physical Exam  Vitals and nursing note reviewed.   Eyes:      Pupils: Pupils are equal, round, and reactive to light.   Skin:     General: Skin is warm.   HENT:      Head: Normocephalic.      Mouth/Throat:      Mouth: Mucous membranes are moist.   Neck:      Vascular: Central line present.   Cardiovascular:      Rate and Rhythm: Normal rate and regular rhythm.   Musculoskeletal:         General: Normal range of motion.   Abdominal:      Palpations: Abdomen is soft.   Constitutional:       Appearance: He is well-developed.   Pulmonary:      Effort: Pulmonary effort is normal.      Breath sounds: Normal breath sounds.   Neurological:      General: No focal deficit present.      Mental Status: He is alert  and oriented to person, place and time. Mental status is at baseline.      Motor: gross motor function is at baseline for patient. Strength full and intact in all extremities.   Genitourinary/Anorectal:  Broderick present.        Diagnostic Studies        09/21/24 EKG: Normal sinus rhythm, no ST or T wave abnormalities.   This was personally reviewed by myself.       Labs:  CBC    Recent Labs     09/20/24  0847 09/20/24  1257 09/21/24  0435   WBC 10.38*  --  15.38*   HGB 11.0* 10.9* 9.6*   HCT 33.3* 32.7* 29.6*     --  161     BMP    Recent Labs     09/20/24  1946 09/21/24  0435   SODIUM 137 135   K 3.9 4.3    101   CO2 27 27   AGAP 7 7   BUN 14 15   CREATININE 0.85 0.90   CALCIUM 7.5* 7.8*        Baseline Creat: 0.8   Coags    Recent Labs     09/20/24  0350 09/20/24  0712 09/21/24  0435   INR 0.76* 0.70* 1.20*   PTT 35* 31  --               Additional Electrolytes  Recent Labs     09/20/24  0432 09/20/24  0701 09/20/24  1946 09/21/24  0435   MG  --    < > 1.9 2.2   PHOS  --   --  3.4  --    CAIONIZED 1.24  --  1.01*  --     < > = values in this interval not displayed.          Blood Gas    No recent results  No recent results LFTs  Recent Labs     09/19/24  1607   ALT 17   AST 18   ALKPHOS 50   ALB 4.2   TBILI 0.65       Infectious    No recent results     No recent results Glucose  Recent Labs     09/19/24  1607 09/20/24  0701 09/20/24  1946 09/21/24  0435   GLUC 122 120 126 118        Collaborative bedside rounds performed with cardiac surgery attending, critical care attending and bedside RN.

## 2024-09-21 NOTE — PROGRESS NOTES
Progress Note - Cardiothoracic Surgery   Santos Sandoval 59 y.o. male MRN: [id number]  Unit/Bed#: PPHP-313-01 Encounter: [id number]      POD # 2 s/p repair of ATAAD with total arch and FET    Pt seen/examined.  Interval history and data reviewed with critical care team.  Pt doing well.  No specific complaints.        Medications:   Scheduled Meds:  Current Facility-Administered Medications   Medication Dose Route Frequency Provider Last Rate    acetaminophen  975 mg Oral Q6H While awake Lora Murdock PA-C      amiodarone  200 mg Oral Q8H [facility] Lora Murdock PA-C      aspirin  325 mg Oral Daily Lora Murdock PA-C      atorvastatin  80 mg Oral Daily With Dinner Lora Murdock PA-C      bisacodyl  10 mg Rectal Daily PRN Lora Murdock PA-C      chlorhexidine  15 mL Mouth/Throat BID Lora Murdock PA-C      folic acid  1 mg Oral Daily Sukumar Hillman PA-C      heparin (porcine)  5,000 Units Subcutaneous Q8H [facility] Orlin Byrd PA-C      insulin regular (HumuLIN R,NovoLIN R) 1 Units/mL in sodium chloride 0.9 % 100 mL infusion  0.3-21 Units/hr Intravenous Titrated Lora Murdock PA-C 3 Units/hr (09/21/24 0848)    metoprolol tartrate  25 mg Oral Q12H [facility] Shannon Serrano PA-C      multivitamin-minerals  1 tablet Oral Daily Sukumar Hillman PA-C      mupirocin  1 Application Nasal Q12H [facility] Lora Murdock PA-C      niCARdipine  2.5-15 mg/hr Intravenous Titrated Sukumar Hillman PA-C Stopped (09/21/24 0855)    ondansetron  4 mg Intravenous Q6H PRN Lora Murdock PA-C      oxyCODONE  2.5 mg Oral Q4H PRN Lora Murdock PA-C      Or    oxyCODONE  5 mg Oral Q4H PRN Lora Murdock PA-C      pantoprazole  40 mg Oral Early Morning Lora Murdock PA-C      polyethylene glycol  17 g Oral Daily Lora Murdock PA-C      potassium chloride  20 mEq Intravenous Once PRN Lora Murdock PA-C      sodium chloride  20 mL/hr Intravenous Continuous Lora Murdock PA-C 20 mL/hr  (09/20/24 0437)    thiamine  100 mg Oral Daily Sukumar Hillman PA-C       Continuous Infusions:insulin regular (HumuLIN R,NovoLIN R) 1 Units/mL in sodium chloride 0.9 % 100 mL infusion, 0.3-21 Units/hr, Last Rate: 3 Units/hr (09/21/24 0848)  niCARdipine, 2.5-15 mg/hr, Last Rate: Stopped (09/21/24 0855)  sodium chloride, 20 mL/hr, Last Rate: 20 mL/hr (09/20/24 0437)      PRN Meds:.  bisacodyl    ondansetron    oxyCODONE **OR** oxyCODONE    potassium chloride    Vitals: Blood pressure 126/72, pulse 62, temperature 98.8 °F (37.1 °C), resp. rate (!) 28, weight 85.4 kg (188 lb 4.4 oz), SpO2 94%.,Body mass index is 26.63 kg/m².  I/O last 24 hours:  In: 3122.5 [P.O.:720; I.V.:1702.5; IV Piggyback:700]  Out: 5540 [Urine:5200; Chest Tube:340]  Invasive Devices       Central Venous Catheter Line  Duration             CVC Central Lines 09/19/24 1 day              Peripheral Intravenous Line  Duration             Peripheral IV 09/19/24 Left Antecubital 1 day    Peripheral IV 09/19/24 Right Antecubital 1 day    Peripheral IV 09/19/24 Right Arm 1 day              Drain  Duration             Chest Tube 1 Anterior;Mediastinal 32 Fr. 1 day    Chest Tube 2 Posterior;Mediastinal 32 Fr. 1 day    Urethral Catheter Latex;Temperature probe 16 Fr. 1 day                      Lab, Imaging and other studies:   Results from last 7 days   Lab Units 09/21/24  0435 09/20/24  1257 09/20/24  0847 09/20/24  0833   WBC Thousand/uL 15.38*  --  10.38* 9.88   HEMOGLOBIN g/dL 9.6* 10.9* 11.0* 10.5*   HEMATOCRIT % 29.6* 32.7* 33.3* 31.7*   PLATELETS Thousands/uL 161  --  182 176     Results from last 7 days   Lab Units 09/21/24  0435 09/20/24  1946 09/20/24  1257 09/20/24  0701 09/20/24  0432   POTASSIUM mmol/L 4.3 3.9 4.3 3.9  --    CHLORIDE mmol/L 101 103  --  109*  --    CO2 mmol/L 27 27  --  27  --    CO2, I-STAT mmol/L  --   --   --   --  28   BUN mg/dL 15 14  --  20  --    CREATININE mg/dL 0.90 0.85  --  1.22  --    GLUCOSE, ISTAT mg/dl  --   --    "--   --  171*   CALCIUM mg/dL 7.8* 7.5*  --  8.6  --      Results from last 7 days   Lab Units 09/21/24  0435 09/20/24  0712 09/20/24  0350   INR  1.20* 0.70* 0.76*   PTT seconds  --  31 35*     No results for input(s): \"PHART\", \"QSK6EPG\", \"PO2ART\", \"ZEQ7URA\", \"M0WHUQUD\", \"BEART\" in the last 72 hours.        Plan:    DC Philip/Cordis/Maisha/Davie.  Continue chest tubes  Transfer to floor.  Ambulate.  Incentive spirometry.  Diuresis.  PO ASA/Statin/B blocker.  Consider adding low dose lisinopril if needed for blood pressure control        SIGNATURE: Blaine Harmon DO  DATE: September 21, 2024  TIME: 10:25 AM    "

## 2024-09-21 NOTE — OCCUPATIONAL THERAPY NOTE
Occupational Therapy Evaluation     Patient Name: Santos Sandoval  Today's Date: 9/21/2024  Problem List  Principal Problem:    Type 1 dissection of ascending aorta (HCC)  Active Problems:    Essential hypertension    Smoking    Hx of colonoscopy with polypectomy    S/P aortic dissection repair    Acute blood loss as cause of postoperative anemia    Past Medical History  Past Medical History:   Diagnosis Date    Colon polyps     Hypertension      Past Surgical History  Past Surgical History:   Procedure Laterality Date    COLONOSCOPY W/ BIOPSIES AND POLYPECTOMY      ND AS-AORT GRF W/CARD BYP & AORTIC ROOT RPLCMT N/A 9/19/2024    Procedure: BENTALL PROCEDURE ;ASCENDING AORTIC AND ARCH AORTIC REPAIR W/ 28X10 GELWEAVE GRAFT; TOTAL ARCH REPLACEMENT W/ 12X8X8 GELWEAVE GRAFT; DESCENDING AORTIC REPAIR W/ [id number] TAG CONFORMABLE STENT; RIGHT AXILLARY CANNULATION; CIRCULATORY ARREST;  Surgeon: Rio Menchaca MD;  [facility];  Service: Cardiac Surgery      09/21/24 0949   OT Last Visit   OT Visit Date 09/21/24   Note Type   Note type Evaluation   Pain Assessment   Pain Assessment Tool 0-10   Pain Score No Pain   Restrictions/Precautions   Weight Bearing Precautions Per Order No   Other Precautions Cardiac/sternal;Cognitive;Chair Alarm;Bed Alarm;O2;Pain;Fall Risk;Multiple lines;Telemetry  (5L O2, chest tube to suction)   Home Living   Type of Home House   Home Layout Two level   Bathroom Shower/Tub Tub/shower unit   Bathroom Toilet Standard   Additional Comments Pt resides with a roommate in a half of a double.  Roommate is not able to provide assist   Prior Function   Level of [place] Independent with ADLs;Independent with functional mobility;Independent with IADLS   Lives With Friend(s)   IADLs Independent with driving;Independent with meal prep;Independent with medication management   Falls in the last 6 months 0   Vocational Full time employment   Lifestyle   Autonomy Pt reports being completely  "independent with ADLs, IADLs, and functional mobility without use of DME PTA.  Pt is a    Reciprocal Relationships has a supportive sister locally that can provide some assist   Service to Others works FT at a [employer]   Subjective   Subjective \"I feel confused\"   ADL   Eating Assistance 5  Supervision/Setup   Grooming Assistance 5  Supervision/Setup   UB Bathing Assistance 4  Minimal Assistance   LB Bathing Assistance 4  Minimal Assistance   UB Dressing Assistance 4  Minimal Assistance   LB Dressing Assistance 4  Minimal Assistance   Toileting Assistance  4  Minimal Assistance   Bed Mobility   Additional Comments Pt seated in chair upon therapist entry and bed mobility not assessed at this time   Transfers   Sit to Stand 4  Minimal assistance   Additional items Assist x 1;Increased time required   Stand to Sit 4  Minimal assistance   Additional items Assist x 1   Additional Comments use of rw and cues for safety   Functional Mobility   Functional Mobility 4  Minimal assistance   Additional Comments Ax1 with use of rw   Balance   Static Sitting Fair +   Dynamic Sitting Fair   Static Standing Fair -   Dynamic Standing Poor +   Ambulatory Poor +   Activity Tolerance   Activity Tolerance Patient limited by fatigue   Medical Staff Made Aware PT Eva   Nurse Made Aware Pt cleared by RN for OT evaluation and OOB mobility   RUE Assessment   RUE Assessment WFL   LUE Assessment   LUE Assessment WFL   Psychosocial   Psychosocial (WDL) WDL   Cognition   Overall Cognitive Status Impaired   Arousal/Participation Alert;Responsive;Cooperative   Attention Attends with cues to redirect   Orientation Level Oriented X4   Memory Decreased recall of precautions;Decreased recall of recent events;Decreased short term memory   Following Commands Follows one step commands with increased time or repetition   Comments Pt is pleasant and cooperative.  Endorses feeling that his cognition is impaired currently.  Requires significantly " inc time for processing, demonstrates poor insight and decreased safety awareness.  Cognitive changes likely related to current medications.  Plan to complete cognitive assessment if deficits persist in a few days   Assessment   Limitation Decreased ADL status;Decreased Safe judgement during ADL;Decreased cognition;Decreased endurance;Decreased high-level ADLs;Decreased self-care trans   Prognosis Good   Assessment Pt is a 59 year old male seen for initial OT evaluation s/p [facility] [place] [facility] with type A aortic dissection s/p ascending aorta for arch replacement with gelweave graft, resuspension, or aortic valve.  Additional active problems include: HTN, BRODY and tobacco abuse.  Pt resides with a roommate in half of a double with 2STE.  Roommate is not able to provide assist.  Pt reports being completely independent with ADLs, IADLs, and functional mobility without use of DME PTA.  Pt is a  and works FT in a [employer].  Pt is currently demonstrating the following occupational deficits: ADLs with Devi, functional transfers with Devi, and functional mobility with Devi with use of rw.  Factors currently limiting pt's independence in these areas include: generalized weakness, pain, endurance, inaccessible home environment, limited support in home environment, cognitive deficits, functional mobility.  From an OT standpoint, anticipate that pt will progress to d/c with level III rehab resources and use of sc.  Pt is to continue to benefit from skilled occupational therapy services while in the hospital to maximize functioning and independence with daily activities.  See below for OT goals to be addressed 3-5x/wk.   Goals   Patient Goals to go home   Plan   Treatment Interventions ADL retraining;Functional transfer training;UE strengthening/ROM;Endurance training;Cognitive reorientation;Patient/family training;Equipment evaluation/education;Compensatory technique education;Continued  evaluation;Activityengagement;Energy conservation;Cardiac education   Goal Expiration Date 10/01/24   OT Treatment Day 1   OT Frequency 3-5x/wk   Discharge Recommendation   Rehab Resource Intensity Level, OT III (Minimum Resource Intensity)   Equipment Recommended Shower/Tub chair with back ($)   AM-PAC Daily Activity Inpatient   Lower Body Dressing 3   Bathing 3   Toileting 3   Upper Body Dressing 3   Grooming 3   Eating 3   Daily Activity Raw Score 18   Daily Activity Standardized Score (Calc for Raw Score >=11) 38.66   AM-PAC Applied Cognition Inpatient   Following a Speech/Presentation 2   Understanding Ordinary Conversation 3   Taking Medications 2   Remembering Where Things Are Placed or Put Away 2   Remembering List of 4-5 Errands 2   Taking Care of Complicated Tasks 2   Applied Cognition Raw Score 13   Applied Cognition Standardized Score 30.46     Goals:  Pt will be attentive for 100% of formal cognitive assessment for safe discharge/planning    Pt will complete all self care tasks with Luba with use of DME as appropriate and while maintaining precautions    Pt will complete functional transfers on/off all surfaces with Luba with use of DME as appropriate, good balance and safety    Pt will complete household distance functional mobility with Luba with use of DME as appropriate, good balance, safety and endurance and stable vital signs.    Leticia Lynn, MOT, OTR/L, CSRS  [place] License [id number]  [place] License [id number]

## 2024-09-21 NOTE — RESPIRATORY THERAPY NOTE
Resp care   09/21/24 0857   Respiratory Assessment   Assessment Type During-treatment   General Appearance Awake;Alert   Respiratory Pattern Normal   Chest Assessment Chest expansion symmetrical   Bilateral Breath Sounds Diminished   Resp Comments pt found on ra, spo2 is 89%, pt placed on 5L nc, spo2 is 92%, bs are diminished, IS therapy performed, will d/c acp/rcp.   Oxygen Therapy/Pulse Ox   O2 Device Nasal cannula   Nasal Cannula O2 Flow Rate (L/min) 5 L/min   Calculated FIO2 (%) - Nasal Cannula 40   SpO2 Activity At Rest   $ Pulse Oximetry Spot Check Charge Completed

## 2024-09-21 NOTE — PHYSICAL THERAPY NOTE
Physical Therapy Evaluation    Patient's Name: Santos Sandoval    Admitting Diagnosis  Type A rupture    Problem List  Patient Active Problem List   Diagnosis    Essential hypertension    Smoking    Hx of colonoscopy with polypectomy    Gross hematuria    Neck muscle spasm    Type 1 dissection of ascending aorta (HCC)    S/P aortic dissection repair    Acute blood loss as cause of postoperative anemia       Past Medical History  Past Medical History:   Diagnosis Date    Colon polyps     Hypertension        Past Surgical History  Past Surgical History:   Procedure Laterality Date    COLONOSCOPY W/ BIOPSIES AND POLYPECTOMY      [place] AS-AORT GRF W/CARD BYP & AORTIC ROOT RPLCMT N/A 9/19/2024    Procedure: BENTALL PROCEDURE ;ASCENDING AORTIC AND ARCH AORTIC REPAIR W/ 28X10 GELWEAVE GRAFT; TOTAL ARCH REPLACEMENT W/ 12X8X8 GELWEAVE GRAFT; DESCENDING AORTIC REPAIR W/ [id number] TAG CONFORMABLE STENT; RIGHT AXILLARY CANNULATION; CIRCULATORY ARREST;  Surgeon: Rio Menchaca MD;  [facility];  Service: Cardiac Surgery        09/21/24 0948   PT Last Visit   PT Visit Date 09/21/24   Note Type   Note type Evaluation   Pain Assessment   Pain Assessment Tool 0-10   Pain Score No Pain  (at rest)   Restrictions/Precautions   Weight Bearing Precautions Per Order No   Other Precautions Cardiac/sternal;Multiple lines;Telemetry;O2;Fall Risk;Pain;Fluid restriction  (O2 5-6L, CT to suction)   Home Living   Type of Home House  (1/2 a double)   Home Layout Two level;Stairs to enter with rails  (2 MINE)   Bathroom Shower/Tub Tub/shower unit   Bathroom Toilet Standard   Prior Function   Level of [place] Independent with ADLs;Independent with functional mobility;Independent with IADLS  (I community ambulator w/o AD)   Lives With   (roommate (unable to provide assistance))   Receives Help From Family  (local sister)   IADLs Independent with driving;Independent with medication management;Independent with meal prep   Falls in the  last 6 months 0   Vocational Full time employment  (works nights ([employer]))   General   Family/Caregiver Present No   Cognition   Arousal/Participation Alert   Orientation Level Oriented X4   Subjective   Subjective Agreeable to mobilize.   RLE Assessment   RLE Assessment WFL   LLE Assessment   LLE Assessment WFL   Coordination   Movements are Fluid and Coordinated 1   Bed Mobility   Additional Comments Pt greeted in chair.   Transfers   Sit to Stand 4  Minimal assistance   Additional items Assist x 1;Increased time required;Verbal cues;Armrests   Stand to Sit 4  Minimal assistance   Additional items Assist x 1;Armrests;Increased time required;Verbal cues   Additional Comments RW   Ambulation/Elevation   Gait pattern Excessively slow;Short stride;Forward Flexion   Gait Assistance 4  Minimal assist   Additional items Assist x 1;Tactile cues;Verbal cues  (+ assist for chair follow + line management)   Assistive Device Rolling walker   Distance 50'x2   Stair Management Assistance Not tested   Balance   Static Sitting Fair +   Dynamic Sitting Fair   Static Standing Fair -   Dynamic Standing Poor +   Ambulatory Poor +  (RW)   Endurance Deficit   Endurance Deficit Yes   Endurance Deficit Description fatigue, decreased gait distance tolerance compared to baseline   Activity Tolerance   Activity Tolerance Patient limited by fatigue   Medical Staff Made Aware [facility]   Nurse Made Aware yes - cleared for therapy   Assessment   Prognosis Excellent   Problem List Decreased endurance;Impaired balance;Decreased mobility   Assessment Pt is 59 y.o. male seen for a PT evaluation s/p admit to [facility] on 9/19/2024. Pt presenting w/ type 1 dissection of ascending aorta, s/p repair of ATAAD w/ total arch and FET 9/19/24. Please see above for other active problem list / PMH.     PT now consulted to assess functional mobility and needs for safe d/c planning. Prior to admission, pt was I + active w/o AD, lives in a  house w/ stairs w/ a roommate.     Currently pt requires MinAx1 for functional transfers; MinAx1 for ambulation w/ RW. Pt presents functioning below baseline and w/ overall mobility deficits 2* to: decreased LE strength; generalized weakness/deconditioning; decreased endurance; impaired balance; gait deviations; fatigue; multiple lines. These impairments place pt at risk for falls.     Pt will continue to benefit from skilled PT interventions to address stated impairments; to maximize functional potential; for ongoing pt/ family training; and DME needs. PT is currently recommending Level 3 resources and a RW.   Barriers to Discharge Decreased caregiver support;Inaccessible home environment   Goals   Patient Goals go home   STG Expiration Date 10/05/24   Short Term Goal #1 In 14 days pt will complete: 1) Bed mobility skills with Luba to facilitate safe return to previous living environment. 2) Functional transfers with Luba to facilitate safe return to previous living environment. 3) Ambulation with least restrictive ' w/ Luba without LOB for safe ambulation in home/community environment. 4) Improve balance scores by 1 grade to decrease fall risk. 5) Improve LE strength grades by 1 to increase independence w/ all functional mobility, transfers and gait. 6) PT for ongoing pt and family education; DME needs and D/C planning to promote highest level of function in least restrictive environment. 7) Stair training up/ down 12 steps with most appropriate technique and Luba for safe access to previous living environment and to increase community access.   Plan   Treatment/Interventions Functional transfer training;LE strengthening/ROM;Elevations;Therapeutic exercise;Endurance training;Patient/family training;Equipment eval/education;Bed mobility;Gait training;Compensatory technique education;Spoke to nursing;OT   PT Frequency 4-6x/wk   Discharge Recommendation   Rehab Resource Intensity Level, PT III (Minimum Resource  Intensity)   Equipment Recommended Walker   Walker Package Recommended Wheeled walker   Change/add to Walker Package? No   AM-PAC Basic Mobility Inpatient   Turning in Flat Bed Without Bedrails 3   Lying on Back to Sitting on Edge of Flat Bed Without Bedrails 3   Moving Bed to Chair 3   Standing Up From Chair Using Arms 3   Walk in Room 3   Climb 3-5 Stairs With Railing 2   Basic Mobility Inpatient Raw Score 17   Basic Mobility Standardized Score 39.67   [facility] Highest Level Of Mobility   -HLM Goal 5: Stand one or more mins   -HLM Achieved 7: Walk 25 feet or more   End of Consult   Patient Position at End of Consult Bedside chair;All needs within reach  (all lines in tact, CT to suction, BLE elevated, SCDs activated)     Eva Rodrigez, PT, DPT

## 2024-09-21 NOTE — PLAN OF CARE
Problem: PHYSICAL THERAPY ADULT  Goal: Performs mobility at highest level of function for planned discharge setting.  See evaluation for individualized goals.  Description: Treatment/Interventions: Functional transfer training, LE strengthening/ROM, Elevations, Therapeutic exercise, Endurance training, Patient/family training, Equipment eval/education, Bed mobility, Gait training, Compensatory technique education, Spoke to nursing, OT  Equipment Recommended: Walker       See flowsheet documentation for full assessment, interventions and recommendations.  Note: Prognosis: Excellent  Problem List: Decreased endurance, Impaired balance, Decreased mobility  Assessment: Pt is 59 y.o. male seen for a PT evaluation s/p admit to [facility] on 9/19/2024. Pt presenting w/ type 1 dissection of ascending aorta, s/p repair of ATAAD w/ total arch and FET 9/19/24. Please see above for other active problem list / PMH. PT now consulted to assess functional mobility and needs for safe d/c planning. Prior to admission, pt was I + active w/o AD, lives in a house w/ stairs w/ a roommate. Currently pt requires MinAx1 for functional transfers; MinAx1 for ambulation w/ RW. Pt presents functioning below baseline and w/ overall mobility deficits 2* to: decreased LE strength; generalized weakness/deconditioning; decreased endurance; impaired balance; gait deviations; fatigue; multiple lines. These impairments place pt at risk for falls. Pt will continue to benefit from skilled PT interventions to address stated impairments; to maximize functional potential; for ongoing pt/ family training; and DME needs. PT is currently recommending Level 3 resources and a RW.  Barriers to Discharge: Decreased caregiver support, Inaccessible home environment     Rehab Resource Intensity Level, PT: III (Minimum Resource Intensity)    See flowsheet documentation for full assessment.

## 2024-09-21 NOTE — PLAN OF CARE
Problem: PAIN - ADULT  Goal: Verbalizes/displays adequate comfort level or baseline comfort level  Description: Interventions:  - Encourage patient to monitor pain and request assistance  - Assess pain using appropriate pain scale  - Administer analgesics based on type and severity of pain and evaluate response  - Implement non-pharmacological measures as appropriate and evaluate response  - Consider cultural and social influences on pain and pain management  - Notify physician/advanced practitioner if interventions unsuccessful or patient reports new pain  Outcome: Progressing     Problem: INFECTION - ADULT  Goal: Absence or prevention of progression during hospitalization  Description: INTERVENTIONS:  - Assess and monitor for signs and symptoms of infection  - Monitor lab/diagnostic results  - Monitor all insertion sites, i.e. indwelling lines, tubes, and drains  - Monitor endotracheal if appropriate and nasal secretions for changes in amount and color  - [place] appropriate cooling/warming therapies per order  - Administer medications as ordered  - Instruct and encourage patient and family to use good hand hygiene technique  - Identify and instruct in appropriate isolation precautions for identified infection/condition  Outcome: Progressing  Goal: Absence of fever/infection during neutropenic period  Description: INTERVENTIONS:  - Monitor WBC    Outcome: Progressing     Problem: SAFETY ADULT  Goal: Patient will remain free of falls  Description: INTERVENTIONS:  - Educate patient/family on patient safety including physical limitations  - Instruct patient to call for assistance with activity   - Consult OT/PT to assist with strengthening/mobility   - Keep Call bell within reach  - Keep bed low and locked with side rails adjusted as appropriate  - Keep care items and personal belongings within reach  - Initiate and maintain comfort rounds  - Make Fall Risk Sign visible to staff  - Offer Toileting every 2 Hours,  in advance of need  - Initiate/Maintain alarm  - Obtain necessary fall risk management equipment  - Apply yellow socks and bracelet for high fall risk patients  - Consider moving patient to room near nurses station  Outcome: Progressing  Goal: Maintain or return to baseline ADL function  Description: INTERVENTIONS:  -  Assess patient's ability to carry out ADLs; assess patient's baseline for ADL function and identify physical deficits which impact ability to perform ADLs (bathing, care of mouth/teeth, toileting, grooming, dressing, etc.)  - Assess/evaluate cause of self-care deficits   - Assess range of motion  - Assess patient's mobility; develop plan if impaired  - Assess patient's need for assistive devices and provide as appropriate  - Encourage maximum independence but intervene and supervise when necessary  - Involve family in performance of ADLs  - Assess for home care needs following discharge   - Consider OT consult to assist with ADL evaluation and planning for discharge  - Provide patient education as appropriate  Outcome: Progressing  Goal: Maintains/Returns to pre admission functional level  Description: INTERVENTIONS:  - Perform AM-PAC 6 Click Basic Mobility/ Daily Activity assessment daily.  - Set and communicate daily mobility goal to care team and patient/family/caregiver.   - Collaborate with rehabilitation services on mobility goals if consulted  - Out of bed for toileting  - Record patient progress and toleration of activity level   Outcome: Progressing     Problem: DISCHARGE PLANNING  Goal: Discharge to home or other facility with appropriate resources  Description: INTERVENTIONS:  - Identify barriers to discharge w/patient and caregiver  - Arrange for needed discharge resources and transportation as appropriate  - Identify discharge learning needs (meds, wound care, etc.)  - Arrange for interpretive services to assist at discharge as needed  - Refer to Case Management Department for  coordinating discharge planning if the patient needs post-hospital services based on physician/advanced practitioner order or complex needs related to functional status, cognitive ability, or social support system  Outcome: Progressing     Problem: Knowledge Deficit  Goal: Patient/family/caregiver demonstrates understanding of disease process, treatment plan, medications, and discharge instructions  Description: Complete learning assessment and assess knowledge base.  Interventions:  - Provide teaching at level of understanding  - Provide teaching via preferred learning methods  Outcome: Progressing     Problem: Prexisting or High Potential for Compromised Skin Integrity  Goal: Skin integrity is maintained or improved  Description: INTERVENTIONS:  - Identify patients at risk for skin breakdown  - Assess and monitor skin integrity  - Assess and monitor nutrition and hydration status  - Monitor labs   - Assess for incontinence   - Turn and reposition patient  - Assist with mobility/ambulation  - Relieve pressure over bony prominences  - Avoid friction and shearing  - Provide appropriate hygiene as needed including keeping skin clean and dry  - Evaluate need for skin moisturizer/barrier cream  - Collaborate with interdisciplinary team   - Patient/family teaching  - Consider wound care consult   Outcome: Progressing

## 2024-09-22 PROBLEM — I34.0 MITRAL REGURGITATION: Status: ACTIVE | Noted: 2024-09-22

## 2024-09-22 PROBLEM — D72.829 LEUKOCYTOSIS: Status: ACTIVE | Noted: 2024-09-22

## 2024-09-22 PROBLEM — E87.1 HYPONATREMIA: Status: ACTIVE | Noted: 2024-09-22

## 2024-09-22 PROBLEM — D64.9 ANEMIA: Status: ACTIVE | Noted: 2024-09-22

## 2024-09-22 PROBLEM — R00.1 BRADYCARDIA: Status: ACTIVE | Noted: 2024-09-22

## 2024-09-22 LAB
ABO GROUP BLD BPU: NORMAL
ANION GAP SERPL CALCULATED.3IONS-SCNC: 8 MMOL/L (ref 4–13)
BPU ID: NORMAL
BUN SERPL-MCNC: 27 MG/DL (ref 5–25)
CALCIUM SERPL-MCNC: 8.5 MG/DL (ref 8.4–10.2)
CHLORIDE SERPL-SCNC: 100 MMOL/L (ref 96–108)
CO2 SERPL-SCNC: 26 MMOL/L (ref 21–32)
CREAT SERPL-MCNC: 0.97 MG/DL (ref 0.6–1.3)
CROSSMATCH: NORMAL
ERYTHROCYTE [DISTWIDTH] IN BLOOD BY AUTOMATED COUNT: 13.3 % (ref 11.6–15.1)
GFR SERPL CREATININE-BSD FRML MDRD: 83 ML/MIN/1.73SQ M
GLUCOSE SERPL-MCNC: 103 MG/DL (ref 65–140)
GLUCOSE SERPL-MCNC: 115 MG/DL (ref 65–140)
GLUCOSE SERPL-MCNC: 123 MG/DL (ref 65–140)
GLUCOSE SERPL-MCNC: 133 MG/DL (ref 65–140)
GLUCOSE SERPL-MCNC: 138 MG/DL (ref 65–140)
HCT VFR BLD AUTO: 29.8 % (ref 36.5–49.3)
HGB BLD-MCNC: 9.8 G/DL (ref 12–17)
MAGNESIUM SERPL-MCNC: 2.1 MG/DL (ref 1.9–2.7)
MCH RBC QN AUTO: 29.2 PG (ref 26.8–34.3)
MCHC RBC AUTO-ENTMCNC: 32.9 G/DL (ref 31.4–37.4)
MCV RBC AUTO: 89 FL (ref 82–98)
PLATELET # BLD AUTO: 187 THOUSANDS/UL (ref 149–390)
PMV BLD AUTO: 10 FL (ref 8.9–12.7)
POTASSIUM SERPL-SCNC: 4.5 MMOL/L (ref 3.5–5.3)
RBC # BLD AUTO: 3.36 MILLION/UL (ref 3.88–5.62)
SODIUM SERPL-SCNC: 134 MMOL/L (ref 135–147)
UNIT DISPENSE STATUS: NORMAL
UNIT PRODUCT CODE: NORMAL
UNIT PRODUCT VOLUME: 350 ML
UNIT RH: NORMAL
WBC # BLD AUTO: 19.75 THOUSAND/UL (ref 4.31–10.16)

## 2024-09-22 PROCEDURE — 82948 REAGENT STRIP/BLOOD GLUCOSE: CPT

## 2024-09-22 PROCEDURE — 99024 POSTOP FOLLOW-UP VISIT: CPT | Performed by: PHYSICIAN ASSISTANT

## 2024-09-22 PROCEDURE — 83735 ASSAY OF MAGNESIUM: CPT | Performed by: PHYSICIAN ASSISTANT

## 2024-09-22 PROCEDURE — 85027 COMPLETE CBC AUTOMATED: CPT | Performed by: PHYSICIAN ASSISTANT

## 2024-09-22 PROCEDURE — 93005 ELECTROCARDIOGRAM TRACING: CPT

## 2024-09-22 PROCEDURE — 99222 1ST HOSP IP/OBS MODERATE 55: CPT | Performed by: INTERNAL MEDICINE

## 2024-09-22 PROCEDURE — 80048 BASIC METABOLIC PNL TOTAL CA: CPT | Performed by: PHYSICIAN ASSISTANT

## 2024-09-22 RX ORDER — LISINOPRIL 5 MG/1
5 TABLET ORAL ONCE
Status: COMPLETED | OUTPATIENT
Start: 2024-09-22 | End: 2024-09-22

## 2024-09-22 RX ORDER — LORAZEPAM 0.5 MG/1
0.5 TABLET ORAL ONCE
Status: COMPLETED | OUTPATIENT
Start: 2024-09-22 | End: 2024-09-22

## 2024-09-22 RX ORDER — ATORVASTATIN CALCIUM 20 MG/1
20 TABLET, FILM COATED ORAL
Status: DISCONTINUED | OUTPATIENT
Start: 2024-09-22 | End: 2024-09-24 | Stop reason: HOSPADM

## 2024-09-22 RX ORDER — SENNOSIDES 8.6 MG
1 TABLET ORAL
Status: DISCONTINUED | OUTPATIENT
Start: 2024-09-22 | End: 2024-09-22

## 2024-09-22 RX ORDER — METOPROLOL TARTRATE 50 MG
50 TABLET ORAL EVERY 12 HOURS SCHEDULED
Status: DISCONTINUED | OUTPATIENT
Start: 2024-09-22 | End: 2024-09-23

## 2024-09-22 RX ORDER — LISINOPRIL 5 MG/1
5 TABLET ORAL DAILY
Status: DISCONTINUED | OUTPATIENT
Start: 2024-09-22 | End: 2024-09-22

## 2024-09-22 RX ORDER — BISACODYL 5 MG/1
10 TABLET, DELAYED RELEASE ORAL ONCE
Status: DISCONTINUED | OUTPATIENT
Start: 2024-09-22 | End: 2024-09-22

## 2024-09-22 RX ORDER — POTASSIUM CHLORIDE 750 MG/1
10 TABLET, EXTENDED RELEASE ORAL DAILY
Status: DISCONTINUED | OUTPATIENT
Start: 2024-09-22 | End: 2024-09-24 | Stop reason: HOSPADM

## 2024-09-22 RX ORDER — TORSEMIDE 10 MG/1
10 TABLET ORAL DAILY
Status: DISCONTINUED | OUTPATIENT
Start: 2024-09-22 | End: 2024-09-24 | Stop reason: HOSPADM

## 2024-09-22 RX ORDER — LISINOPRIL 10 MG/1
10 TABLET ORAL DAILY
Status: DISCONTINUED | OUTPATIENT
Start: 2024-09-23 | End: 2024-09-23

## 2024-09-22 RX ADMIN — DOCUSATE SODIUM 100 MG: 100 CAPSULE, LIQUID FILLED ORAL at 17:01

## 2024-09-22 RX ADMIN — FOLIC ACID 1 MG: 1 TABLET ORAL at 09:36

## 2024-09-22 RX ADMIN — POLYETHYLENE GLYCOL 3350 17 G: 17 POWDER, FOR SOLUTION ORAL at 09:36

## 2024-09-22 RX ADMIN — ACETAMINOPHEN 975 MG: 325 TABLET ORAL at 19:57

## 2024-09-22 RX ADMIN — TEMAZEPAM 15 MG: 15 CAPSULE ORAL at 21:54

## 2024-09-22 RX ADMIN — OXYCODONE HYDROCHLORIDE 5 MG: 5 TABLET ORAL at 03:48

## 2024-09-22 RX ADMIN — HEPARIN SODIUM 5000 UNITS: 5000 INJECTION INTRAVENOUS; SUBCUTANEOUS at 21:54

## 2024-09-22 RX ADMIN — LORAZEPAM 0.5 MG: 0.5 TABLET ORAL at 05:12

## 2024-09-22 RX ADMIN — AMIODARONE HYDROCHLORIDE 200 MG: 200 TABLET ORAL at 21:54

## 2024-09-22 RX ADMIN — ATORVASTATIN CALCIUM 20 MG: 20 TABLET, FILM COATED ORAL at 17:01

## 2024-09-22 RX ADMIN — METOPROLOL TARTRATE 50 MG: 50 TABLET, FILM COATED ORAL at 19:56

## 2024-09-22 RX ADMIN — AMIODARONE HYDROCHLORIDE 200 MG: 200 TABLET ORAL at 05:12

## 2024-09-22 RX ADMIN — LISINOPRIL 5 MG: 5 TABLET ORAL at 09:36

## 2024-09-22 RX ADMIN — PANTOPRAZOLE SODIUM 40 MG: 40 TABLET, DELAYED RELEASE ORAL at 05:12

## 2024-09-22 RX ADMIN — TORSEMIDE 10 MG: 10 TABLET ORAL at 09:36

## 2024-09-22 RX ADMIN — Medication 1 TABLET: at 09:36

## 2024-09-22 RX ADMIN — LISINOPRIL 5 MG: 5 TABLET ORAL at 19:56

## 2024-09-22 RX ADMIN — ACETAMINOPHEN 975 MG: 325 TABLET ORAL at 14:31

## 2024-09-22 RX ADMIN — AMIODARONE HYDROCHLORIDE 200 MG: 200 TABLET ORAL at 14:32

## 2024-09-22 RX ADMIN — THIAMINE HCL TAB 100 MG 100 MG: 100 TAB at 09:36

## 2024-09-22 RX ADMIN — METOPROLOL TARTRATE 50 MG: 50 TABLET, FILM COATED ORAL at 09:36

## 2024-09-22 RX ADMIN — ACETAMINOPHEN 975 MG: 325 TABLET ORAL at 09:36

## 2024-09-22 RX ADMIN — MUPIROCIN 1 APPLICATION: 20 OINTMENT TOPICAL at 09:36

## 2024-09-22 RX ADMIN — POTASSIUM CHLORIDE 10 MEQ: 750 TABLET, EXTENDED RELEASE ORAL at 09:36

## 2024-09-22 RX ADMIN — HEPARIN SODIUM 5000 UNITS: 5000 INJECTION INTRAVENOUS; SUBCUTANEOUS at 14:32

## 2024-09-22 RX ADMIN — DOCUSATE SODIUM 100 MG: 100 CAPSULE, LIQUID FILLED ORAL at 09:36

## 2024-09-22 RX ADMIN — HEPARIN SODIUM 5000 UNITS: 5000 INJECTION INTRAVENOUS; SUBCUTANEOUS at 05:12

## 2024-09-22 RX ADMIN — TEMAZEPAM 15 MG: 15 CAPSULE ORAL at 03:48

## 2024-09-22 RX ADMIN — ASPIRIN 325 MG ORAL TABLET 325 MG: 325 PILL ORAL at 09:36

## 2024-09-22 NOTE — CONSULTS
Consultation - Cardiology Team 1  Santos Sandoval 59 y.o. male MRN: [id number]  Unit/Bed#: PPHP-315-01 Encounter: [id number]  Assessment & Plan  Type 1 dissection of ascending aorta (HCC)  -Type A dissection involving the entire aorta and extending to the level of the distal common iliac artery and distal left external iliac artery.  Proximally extends from the level of the sinoatrial junction however likely involves aortic root.  Complex configuration of the distal flap with several entry and reentry tears.   - s/p urgent repair 9/19  S/P aortic dissection repair  -Postop day #3 ascending aorta/hemiarch, resuspension of aortic valve and frozen elephant trunk  -On amiodarone 200 mg 3 times daily/aspirin 325 mg daily/atorvastatin 20 mg daily  -On lisinopril 5 mg daily/metoprolol tartrate 50 mg twice daily  -Demadex 10 mg daily for postop volume management  -Chest tube remains.  Broderick out.  -recommend cardiology follow up  Essential hypertension  -Reports longstanding history of hypertension for which she was noncompliant with medical management  -Currently on lisinopril and metoprolol as mentioned. /83  Smoking  -tobacco abuse up to hospitalization  Acute blood loss as cause of postoperative anemia  -H&H 9.8/29.8. stable   Bradycardia  -Presented sinus bradycardia  -resolved. HR 60-70's, tolerating BB  Mitral regurgitation  -Mild to moderate mitral regurgitation  -outpatient follow up  -continue ACE/BB      History of Present Illness   Physician Requesting Consult: Rio Menchaca MD  Reason for Consult / Principal Problem: Dissection of the ascending aorta    HPI: Santos Sandoval is a 59 y.o. year old male who presented with chest pain and shortness of breath.  CTA demonstrated type a dissection involving entire aorta and extending to the level of the distal common right iliac artery and distal left external iliac artery.  Proximally the dissection extends from at least the level of the sinoatrial junction  however likely involves the aortic root, although evaluation of this was limited by motion artifact.  Complex configuration of the dissection with several entry and reentry tears.  Both true and false lumens opacified.  SMA, KARL and bilateral renal arteries arise from what is presumed to be false lumen.  All major aortic branches remain patent.  Diffusely ectatic thoracic and abdominal aorta with ascending aorta measuring 4.3 x 4.0 cm.  He was transferred from [place] for CT surgical evaluation for emergent type Adissection repair.     9/19 he underwent ascending aorta and full arch replacement with 28 mm Gelweave graft and 12 x 8 x 8 Gelweave trifurcated graft.  Frozen elephant trunk with [place] TAG [id number] endovascular graft.  Resuspension of aortic valve.  Final MAURILIO demonstrated aortic valve with normal function and no changes.  Mild to moderate MR    Reports longstanding tobacco history.  No family history of aneurysms.    Inpatient consult to Cardiology  Consult performed by: EDUARDO Sears  Consult ordered by: Sukumar Hillman PA-C          Review of Systems   Constitutional: Negative.    HENT: Negative.     Eyes: Negative.    Respiratory:  Positive for cough and shortness of breath.    Cardiovascular:  Positive for chest pain.        Pre hospital   Gastrointestinal: Negative.    Endocrine: Negative.    Genitourinary: Negative.    Musculoskeletal: Negative.    Allergic/Immunologic: Negative.    Neurological: Negative.    Hematological: Negative.    Psychiatric/Behavioral: Negative.         Historical Information   Past Medical History:   Diagnosis Date    Colon polyps     Hypertension      Past Surgical History:   Procedure Laterality Date    COLONOSCOPY W/ BIOPSIES AND POLYPECTOMY      MS AS-AORT GRF W/CARD BYP & AORTIC ROOT RPLCMT N/A 9/19/2024    Procedure: BENTALL PROCEDURE ;ASCENDING AORTIC AND ARCH AORTIC REPAIR W/ 28X10 GELWEAVE GRAFT; TOTAL ARCH REPLACEMENT W/ 12X8X8 GELWEAVE GRAFT; DESCENDING  AORTIC REPAIR W/ [id number] TAG CONFORMABLE STENT; RIGHT AXILLARY CANNULATION; CIRCULATORY ARREST;  Surgeon: Rio Menchaca MD;  Location: [facility];  Service: Cardiac Surgery     Social History     Substance and Sexual Activity   Alcohol Use Yes    Alcohol/week: 3.0 standard drinks of alcohol    Types: 3 Cans of beer per week    Comment: weekly     Social History     Substance and Sexual Activity   Drug Use Yes    Types: Marijuana     Social History     Tobacco Use   Smoking Status Every Day    Current packs/day: 0.50    Average packs/day: 0.5 packs/day for 45.7 years (22.9 ttl pk-yrs)    Types: Cigarettes    Start date: 1979   Smokeless Tobacco Never   Tobacco Comments    daily     Family History:   Family History   Family history unknown: Yes       Meds/Allergies   current meds:   Current Facility-Administered Medications:     acetaminophen (TYLENOL) tablet 975 mg, Q6H While awake    amiodarone tablet 200 mg, Q8H HOSEA    aspirin tablet 325 mg, Daily    atorvastatin (LIPITOR) tablet 20 mg, Daily With Dinner    docusate sodium (COLACE) capsule 100 mg, BID    folic acid (FOLVITE) tablet 1 mg, Daily    heparin (porcine) subcutaneous injection 5,000 Units, Q8H HOSEA    insulin lispro (HumALOG/ADMELOG) 100 units/mL subcutaneous injection 1-6 Units, TID AC **AND** Fingerstick Glucose (POCT), TID AC    insulin lispro (HumALOG/ADMELOG) 100 units/mL subcutaneous injection 1-6 Units, HS    lisinopril (ZESTRIL) tablet 5 mg, Daily    metoprolol tartrate (LOPRESSOR) tablet 50 mg, Q12H HOSEA    multivitamin-minerals (CENTRUM) tablet 1 tablet, Daily    ondansetron (ZOFRAN) injection 4 mg, Q6H PRN    oxyCODONE (ROXICODONE) split tablet 2.5 mg, Q4H PRN **OR** oxyCODONE (ROXICODONE) IR tablet 5 mg, Q4H PRN    pantoprazole (PROTONIX) EC tablet 40 mg, Early Morning    polyethylene glycol (MIRALAX) packet 17 g, Daily    potassium chloride (Klor-Con M10) CR tablet 10 mEq, Daily    temazepam (RESTORIL) capsule 15 mg, HS PRN    thiamine  tablet 100 mg, Daily    torsemide (DEMADEX) tablet 10 mg, Daily and PTA meds:    Medications Prior to Admission:     amLODIPine-valsartan (EXFORGE) 5-160 MG per tablet    baclofen 10 mg tablet  No Known Allergies    Objective   Vitals: Blood pressure 160/83, pulse 69, temperature 97.6 °F (36.4 °C), temperature source Oral, resp. rate (!) 31, weight 81.8 kg (180 lb 5.4 oz), SpO2 96%.  Orthostatic Blood Pressures      Flowsheet Row Most Recent Value   Blood Pressure 160/83 filed at 09/22/2024 0721   Patient Position - Orthostatic VS Lying filed at 09/21/2024 0400              Intake/Output Summary (Last 24 hours) at 9/22/2024 1005  Last data filed at 9/22/2024 0901  Gross per 24 hour   Intake 265.62 ml   Output 3930 ml   Net -3664.38 ml       Invasive Devices       Central Venous Catheter Line  Duration             CVC Central Lines 09/19/24 2 days              Peripheral Intravenous Line  Duration             Peripheral IV 09/19/24 Left Antecubital 2 days    Peripheral IV 09/19/24 Right Antecubital 2 days    Peripheral IV 09/19/24 Right Arm 2 days              Drain  Duration             Chest Tube 1 Anterior;Mediastinal 32 Fr. 2 days    Chest Tube 2 Posterior;Mediastinal 32 Fr. 2 days                    Physical Exam: /83   Pulse 69   Temp 97.6 °F (36.4 °C) (Oral)   Resp (!) 31   Wt 81.8 kg (180 lb 5.4 oz)   SpO2 96%   BMI 25.51 kg/m²   General Appearance:    Alert, cooperative, no distress, appears stated age   Head:    Normocephalic, no scleral icterus   Eyes:    PERRL   Nose:   Nares normal, septum midline, mucosa normal, no drainage    Throat:   Lips, mucosa, and tongue normal   Neck:   Supple, symmetrical, trachea midline            Lungs:     Clear to auscultation bilaterally, respirations unlabored   Chest Wall:    Covered with dsd    Heart:    Regular rate and rhythm, S1 and S2 normal, no murmur, rub   or gallop   Abdomen:     Soft, non-tender, bowel sounds active all four quadrants,     no  masses, no organomegaly   Extremities:   Extremities normal, atraumatic, no cyanosis or edema   Pulses:   2+ and symmetric all extremities   Skin:   Skin color, texture, turgor normal, no rashes or lesions   Neurologic:   Alert and oriented to person place and time. No focal deficits       Lab Results:   Recent Results (from the past 72 hour(s))   CBC and differential    Collection Time: 09/19/24  4:07 PM   Result Value Ref Range    WBC 13.89 (H) 4.31 - 10.16 Thousand/uL    RBC 4.97 3.88 - 5.62 Million/uL    Hemoglobin 14.4 12.0 - 17.0 g/dL    Hematocrit 44.2 36.5 - 49.3 %    MCV 89 82 - 98 fL    MCH 29.0 26.8 - 34.3 pg    MCHC 32.6 31.4 - 37.4 g/dL    RDW 12.7 11.6 - 15.1 %    MPV 8.2 (L) 8.9 - 12.7 fL    Platelets 240 149 - 390 Thousands/uL    nRBC 0 /100 WBCs    Segmented % 74 43 - 75 %    Immature Grans % 0 0 - 2 %    Lymphocytes % 14 14 - 44 %    Monocytes % 9 4 - 12 %    Eosinophils Relative 3 0 - 6 %    Basophils Relative 0 0 - 1 %    Absolute Neutrophils 10.17 (H) 1.85 - 7.62 Thousands/µL    Absolute Immature Grans 0.05 0.00 - 0.20 Thousand/uL    Absolute Lymphocytes 1.92 0.60 - 4.47 Thousands/µL    Absolute Monocytes 1.23 (H) 0.17 - 1.22 Thousand/µL    Eosinophils Absolute 0.47 0.00 - 0.61 Thousand/µL    Basophils Absolute 0.05 0.00 - 0.10 Thousands/µL   Comprehensive metabolic panel    Collection Time: 09/19/24  4:07 PM   Result Value Ref Range    Sodium 137 135 - 147 mmol/L    Potassium 4.0 3.5 - 5.3 mmol/L    Chloride 101 96 - 108 mmol/L    CO2 27 21 - 32 mmol/L    ANION GAP 9 4 - 13 mmol/L    BUN 25 5 - 25 mg/dL    Creatinine 1.33 (H) 0.60 - 1.30 mg/dL    Glucose 122 65 - 140 mg/dL    Calcium 9.4 8.4 - 10.2 mg/dL    AST 18 13 - 39 U/L    ALT 17 7 - 52 U/L    Alkaline Phosphatase 50 34 - 104 U/L    Total Protein 7.5 6.4 - 8.4 g/dL    Albumin 4.2 3.5 - 5.0 g/dL    Total Bilirubin 0.65 0.20 - 1.00 mg/dL    eGFR 57 ml/min/1.73sq m   HS Troponin 0hr (reflex protocol)    Collection Time: 09/19/24  4:07 PM  "  Result Value Ref Range    hs TnI 0hr 4 \"Refer to ACS Flowchart\"- see link ng/L   B-Type Natriuretic Peptide(BNP)    Collection Time: 09/19/24  4:07 PM   Result Value Ref Range    BNP 49 0 - 100 pg/mL   ECG 12 lead    Collection Time: 09/19/24  4:08 PM   Result Value Ref Range    Ventricular Rate 48 BPM    Atrial Rate 48 BPM    SD Interval 184 ms    QRSD Interval 110 ms    QT Interval 542 ms    QTC Interval 484 ms    P [place] 72 degrees    QRS Axis -61 degrees    T Wave Axis -75 degrees   FLU/RSV/COVID - if FLU/RSV clinically relevant (2hr TAT)    Collection Time: 09/19/24  4:25 PM    Specimen: Nose; Nares   Result Value Ref Range    SARS-CoV-2 Negative Negative    INFLUENZA A PCR Negative Negative    INFLUENZA B PCR Negative Negative    RSV PCR Negative Negative   Type and screen    Collection Time: 09/19/24  5:13 PM   Result Value Ref Range    ABO Grouping O     Rh Factor Positive     Antibody Screen Negative     Specimen Expiration Date 20240922    ABORh Recheck - Contact Blood Bank Prior to Collection    Collection Time: 09/19/24  5:25 PM   Result Value Ref Range    ABO Grouping O     Rh Factor Positive    HS Troponin I 2hr    Collection Time: 09/19/24  5:59 PM   Result Value Ref Range    hs TnI 2hr 4 \"Refer to ACS Flowchart\"- see link ng/L    Delta 2hr hsTnI 0 <20 ng/L   Type and screen    Collection Time: 09/19/24  7:22 PM   Result Value Ref Range    ABO Grouping O     Rh Factor Positive     Antibody Screen Negative     Specimen Expiration Date 20240922    ABORh Recheck - Contact Blood Bank Prior to Collection    Collection Time: 09/19/24  7:22 PM   Result Value Ref Range    ABO Grouping O     Rh Factor Positive    POCT activated clotting time    Collection Time: 09/19/24  7:24 PM   Result Value Ref Range    Activated Clotting Time, i-STAT 154 (H) 89 - 137 sec    Specimen Type ARTERIAL    POCT Blood Gas (CG8+)    Collection Time: 09/19/24  7:25 PM   Result Value Ref Range    pH, Art i-STAT 7.338 (L) 7.350 - " 7.450    pCO2, Art i-STAT 51.5 (H) 36.0 - 44.0 mm HG    pO2, ART i-STAT 350.0 (H) 75.0 - 129.0 mm HG    BE, i-STAT 1 -2 - 3 mmol/L    HCO3, Art i-STAT 27.6 22.0 - 28.0 mmol/L    CO2, i-STAT 29 21 - 32 mmol/L    O2 Sat, i-STAT 100 (H) 60 - 85 %    SODIUM, I-STAT 138 136 - 145 mmol/l    Potassium, i-STAT 4.0 3.5 - 5.3 mmol/L    Calcium, Ionized i-STAT 1.16 1.12 - 1.32 mmol/L    Hct, i-STAT 39 36.5 - 49.3 %    Hgb, i-STAT 13.3 12.0 - 17.0 g/dl    Glucose, i-STAT 126 65 - 140 mg/dl    Specimen Type ARTERIAL    POCT Blood Gas (CG8+)    Collection Time: 09/19/24  9:42 PM   Result Value Ref Range    pH, Art i-STAT 7.234 (L) 7.350 - 7.450    pCO2, Art i-STAT 59.2 (H) 36.0 - 44.0 mm HG    pO2, ART i-STAT 351.0 (H) 75.0 - 129.0 mm HG    BE, i-STAT -3 (L) -2 - 3 mmol/L    HCO3, Art i-STAT 25.1 22.0 - 28.0 mmol/L    CO2, i-STAT 27 21 - 32 mmol/L    O2 Sat, i-STAT 100 (H) 60 - 85 %    SODIUM, I-STAT 137 136 - 145 mmol/l    Potassium, i-STAT 4.2 3.5 - 5.3 mmol/L    Calcium, Ionized i-STAT 1.12 1.12 - 1.32 mmol/L    Hct, i-STAT 34 (L) 36.5 - 49.3 %    Hgb, i-STAT 11.6 (L) 12.0 - 17.0 g/dl    Glucose, i-STAT 151 (H) 65 - 140 mg/dl    Specimen Type ARTERIAL    POCT activated clotting time    Collection Time: 09/19/24  9:42 PM   Result Value Ref Range    Activated Clotting Time, i-STAT 521 (H) 89 - 137 sec    Specimen Type ARTERIAL    POCT Blood Gas (CG8+)    Collection Time: 09/19/24  9:57 PM   Result Value Ref Range    pH, Art i-STAT 7.242 (L) 7.350 - 7.450    pCO2, Art i-STAT 63.9 (H) 36.0 - 44.0 mm HG    pO2, ART i-STAT >400.0 (H) 75.0 - 129.0 mm HG    BE, i-STAT -1 -2 - 3 mmol/L    HCO3, Art i-STAT 27.5 22.0 - 28.0 mmol/L    CO2, i-STAT 29 21 - 32 mmol/L    O2 Sat, i-STAT      SODIUM, I-STAT 132 (L) 136 - 145 mmol/l    Potassium, i-STAT 4.3 3.5 - 5.3 mmol/L    Calcium, Ionized i-STAT 1.04 (L) 1.12 - 1.32 mmol/L    Hct, i-STAT 31 (L) 36.5 - 49.3 %    Hgb, i-STAT 10.5 (L) 12.0 - 17.0 g/dl    Glucose, i-STAT 149 (H) 65 - 140 mg/dl     Specimen Type ARTERIAL    POCT activated clotting time    Collection Time: 09/19/24  9:57 PM   Result Value Ref Range    Activated Clotting Time, i-STAT 402 (H) 89 - 137 sec    Specimen Type ARTERIAL    POCT Blood Gas (CG8+)    Collection Time: 09/19/24 10:18 PM   Result Value Ref Range    ph, Isidro ISTAT 7.273 (L) 7.300 - 7.400    pCO2, Isidro i-STAT 56.5 (H) 42.0 - 50.0 mm HG    pO2, Isidro i-STAT 80.0 (H) 35.0 - 45.0 mm HG    BE, i-STAT -1 -2 - 3 mmol/L    HCO3, Isidro i-STAT 26.1 24.0 - 30.0 mmol/L    CO2, i-STAT 28 21 - 32 mmol/L    O2 Sat, i-STAT 94 (H) 60 - 85 %    SODIUM, I-STAT 135 (L) 136 - 145 mmol/l    Potassium, i-STAT 3.5 3.5 - 5.3 mmol/L    Calcium, Ionized i-STAT 1.04 (L) 1.12 - 1.32 mmol/L    Hct, i-STAT 31 (L) 36.5 - 49.3 %    Hgb, i-STAT 10.5 (L) 12.0 - 17.0 g/dl    Glucose, i-STAT 158 (H) 65 - 140 mg/dl    Specimen Type VENOUS    POCT activated clotting time    Collection Time: 09/19/24 10:18 PM   Result Value Ref Range    Activated Clotting Time, i-STAT 653 (H) 89 - 137 sec    Specimen Type VENOUS    POCT Blood Gas (CG8+)    Collection Time: 09/19/24 11:05 PM   Result Value Ref Range    pH, Art i-STAT 7.439 7.350 - 7.450    pCO2, Art i-STAT 33.1 (L) 36.0 - 44.0 mm HG    pO2, ART i-STAT >400.0 (H) 75.0 - 129.0 mm HG    BE, i-STAT -1 -2 - 3 mmol/L    HCO3, Art i-STAT 22.5 22.0 - 28.0 mmol/L    CO2, i-STAT 23 21 - 32 mmol/L    O2 Sat, i-STAT      SODIUM, I-STAT 131 (L) 136 - 145 mmol/l    Potassium, i-STAT 4.2 3.5 - 5.3 mmol/L    Calcium, Ionized i-STAT 0.90 (L) 1.12 - 1.32 mmol/L    Hct, i-STAT 26 (L) 36.5 - 49.3 %    Hgb, i-STAT 8.8 (L) 12.0 - 17.0 g/dl    Glucose, i-STAT 145 (H) 65 - 140 mg/dl    Specimen Type ARTERIAL    POCT activated clotting time    Collection Time: 09/19/24 11:05 PM   Result Value Ref Range    Activated Clotting Time, i-STAT >1,000 (H) 89 - 137 sec    Specimen Type ARTERIAL    POCT activated clotting time    Collection Time: 09/20/24 12:01 AM   Result Value Ref Range    Activated  Clotting Time, i-STAT 683 (H) 89 - 137 sec    Specimen Type ARTERIAL    POCT Blood Gas (CG8+)    Collection Time: 09/20/24 12:02 AM   Result Value Ref Range    pH, Art i-STAT 7.230 (L) 7.350 - 7.450    pCO2, Art i-STAT 57.4 (H) 36.0 - 44.0 mm HG    pO2, ART i-STAT 377.0 (H) 75.0 - 129.0 mm HG    BE, i-STAT -4 (L) -2 - 3 mmol/L    HCO3, Art i-STAT 24.1 22.0 - 28.0 mmol/L    CO2, i-STAT 26 21 - 32 mmol/L    O2 Sat, i-STAT 100 (H) 60 - 85 %    SODIUM, I-STAT 134 (L) 136 - 145 mmol/l    Potassium, i-STAT 3.6 3.5 - 5.3 mmol/L    Calcium, Ionized i-STAT 0.93 (L) 1.12 - 1.32 mmol/L    Hct, i-STAT 27 (L) 36.5 - 49.3 %    Hgb, i-STAT 9.2 (L) 12.0 - 17.0 g/dl    Glucose, i-STAT 271 (H) 65 - 140 mg/dl    Specimen Type ARTERIAL    POCT Blood Gas (CG8+)    Collection Time: 09/20/24 12:37 AM   Result Value Ref Range    pH, Art i-STAT 7.401 7.350 - 7.450    pCO2, Art i-STAT 47.4 (H) 36.0 - 44.0 mm HG    pO2, ART i-STAT 232.0 (H) 75.0 - 129.0 mm HG    BE, i-STAT 4 (H) -2 - 3 mmol/L    HCO3, Art i-STAT 29.4 (H) 22.0 - 28.0 mmol/L    CO2, i-STAT 31 21 - 32 mmol/L    O2 Sat, i-STAT 100 (H) 60 - 85 %    SODIUM, I-STAT 139 136 - 145 mmol/l    Potassium, i-STAT 3.3 (L) 3.5 - 5.3 mmol/L    Calcium, Ionized i-STAT 0.87 (L) 1.12 - 1.32 mmol/L    Hct, i-STAT 26 (L) 36.5 - 49.3 %    Hgb, i-STAT 8.8 (L) 12.0 - 17.0 g/dl    Glucose, i-STAT 285 (H) 65 - 140 mg/dl    Specimen Type ARTERIAL    POCT activated clotting time    Collection Time: 09/20/24 12:37 AM   Result Value Ref Range    Activated Clotting Time, i-STAT 475 (H) 89 - 137 sec    Specimen Type ARTERIAL    POCT activated clotting time    Collection Time: 09/20/24  1:15 AM   Result Value Ref Range    Activated Clotting Time, i-STAT 476 (H) 89 - 137 sec    Specimen Type VENOUS    POCT Blood Gas (CG8+)    Collection Time: 09/20/24  1:16 AM   Result Value Ref Range    ph, Isidro ISTAT 7.398 7.300 - 7.400    pCO2, Isidro i-STAT 37.5 (L) 42.0 - 50.0 mm HG    pO2, Isidro i-STAT 36.0 35.0 - 45.0 mm HG     BE, i-STAT -2 -2 - 3 mmol/L    HCO3, Isidro i-STAT 23.1 (L) 24.0 - 30.0 mmol/L    CO2, i-STAT 24 21 - 32 mmol/L    O2 Sat, i-STAT 69 60 - 85 %    SODIUM, I-STAT 136 136 - 145 mmol/l    Potassium, i-STAT 4.2 3.5 - 5.3 mmol/L    Calcium, Ionized i-STAT 0.89 (L) 1.12 - 1.32 mmol/L    Hct, i-STAT 28 (L) 36.5 - 49.3 %    Hgb, i-STAT 9.5 (L) 12.0 - 17.0 g/dl    Glucose, i-STAT 285 (H) 65 - 140 mg/dl    Specimen Type VENOUS    POCT activated clotting time    Collection Time: 09/20/24  1:44 AM   Result Value Ref Range    Activated Clotting Time, i-STAT 495 (H) 89 - 137 sec    Specimen Type ARTERIAL    POCT Blood Gas (CG8+)    Collection Time: 09/20/24  1:45 AM   Result Value Ref Range    pH, Art i-STAT 7.387 7.350 - 7.450    pCO2, Art i-STAT 38.0 36.0 - 44.0 mm HG    pO2, ART i-STAT 388.0 (H) 75.0 - 129.0 mm HG    BE, i-STAT -2 -2 - 3 mmol/L    HCO3, Art i-STAT 22.8 22.0 - 28.0 mmol/L    CO2, i-STAT 24 21 - 32 mmol/L    O2 Sat, i-STAT 100 (H) 60 - 85 %    SODIUM, I-STAT 135 (L) 136 - 145 mmol/l    Potassium, i-STAT 5.1 3.5 - 5.3 mmol/L    Calcium, Ionized i-STAT 0.90 (L) 1.12 - 1.32 mmol/L    Hct, i-STAT 26 (L) 36.5 - 49.3 %    Hgb, i-STAT 8.8 (L) 12.0 - 17.0 g/dl    Glucose, i-STAT 284 (H) 65 - 140 mg/dl    Specimen Type ARTERIAL    POCT Blood Gas (CG8+)    Collection Time: 09/20/24  1:57 AM   Result Value Ref Range    pH, Art i-STAT 7.416 7.350 - 7.450    pCO2, Art i-STAT 35.4 (L) 36.0 - 44.0 mm HG    pO2, ART i-STAT 204.0 (H) 75.0 - 129.0 mm HG    BE, i-STAT -2 -2 - 3 mmol/L    HCO3, Art i-STAT 22.7 22.0 - 28.0 mmol/L    CO2, i-STAT 24 21 - 32 mmol/L    O2 Sat, i-STAT 100 (H) 60 - 85 %    SODIUM, I-STAT 136 136 - 145 mmol/l    Potassium, i-STAT 4.8 3.5 - 5.3 mmol/L    Calcium, Ionized i-STAT 1.13 1.12 - 1.32 mmol/L    Hct, i-STAT 26 (L) 36.5 - 49.3 %    Hgb, i-STAT 8.8 (L) 12.0 - 17.0 g/dl    Glucose, i-STAT 280 (H) 65 - 140 mg/dl    Specimen Type ARTERIAL    Platelet count    Collection Time: 09/20/24  2:05 AM   Result  Value Ref Range    Platelets 125 (L) 149 - 390 Thousands/uL    MPV 8.6 (L) 8.9 - 12.7 fL   POCT Blood Gas (CG8+)    Collection Time: 09/20/24  2:20 AM   Result Value Ref Range    pH, Art i-STAT      pCO2, Art i-STAT      pO2, ART i-STAT 71.0 (L) 75.0 - 129.0 mm HG    BE, i-STAT      HCO3, Art i-STAT      CO2, i-STAT      O2 Sat, i-STAT      SODIUM, I-STAT      Potassium, i-STAT      Calcium, Ionized i-STAT      Hct, i-STAT      Hgb, i-STAT      Glucose, i-STAT 283 (H) 65 - 140 mg/dl    Specimen Type ARTERIAL    POCT activated clotting time    Collection Time: 09/20/24  2:20 AM   Result Value Ref Range    Activated Clotting Time, i-STAT 757 (H) 89 - 137 sec    Specimen Type ARTERIAL    POCT activated clotting time    Collection Time: 09/20/24  2:58 AM   Result Value Ref Range    Activated Clotting Time, i-STAT 129 89 - 137 sec    Specimen Type ARTERIAL    POCT Blood Gas (CG8+)    Collection Time: 09/20/24  2:59 AM   Result Value Ref Range    pH, Art i-STAT 7.304 (L) 7.350 - 7.450    pCO2, Art i-STAT 53.6 (H) 36.0 - 44.0 mm HG    pO2, ART i-STAT 239.0 (H) 75.0 - 129.0 mm HG    BE, i-STAT 0 -2 - 3 mmol/L    HCO3, Art i-STAT 26.6 22.0 - 28.0 mmol/L    CO2, i-STAT 28 21 - 32 mmol/L    O2 Sat, i-STAT 100 (H) 60 - 85 %    SODIUM, I-STAT 142 136 - 145 mmol/l    Potassium, i-STAT 3.4 (L) 3.5 - 5.3 mmol/L    Calcium, Ionized i-STAT 1.10 (L) 1.12 - 1.32 mmol/L    Hct, i-STAT 25 (L) 36.5 - 49.3 %    Hgb, i-STAT 8.5 (L) 12.0 - 17.0 g/dl    Glucose, i-STAT 254 (H) 65 - 140 mg/dl    Specimen Type ARTERIAL    Platelet count    Collection Time: 09/20/24  3:50 AM   Result Value Ref Range    Platelets 192 149 - 390 Thousands/uL    MPV 9.0 8.9 - 12.7 fL   Protime-INR    Collection Time: 09/20/24  3:50 AM   Result Value Ref Range    Protime 11.0 (L) 12.3 - 15.0 seconds    INR 0.76 (L) 0.85 - 1.19   APTT    Collection Time: 09/20/24  3:50 AM   Result Value Ref Range    PTT 35 (H) 23 - 34 seconds   Fibrinogen    Collection Time: 09/20/24   3:50 AM   Result Value Ref Range    Fibrinogen 278 206 - 523 mg/dL   POCT activated clotting time    Collection Time: 09/20/24  3:52 AM   Result Value Ref Range    Activated Clotting Time, i-STAT 129 89 - 137 sec    Specimen Type ARTERIAL    POCT Blood Gas (CG8+)    Collection Time: 09/20/24  3:53 AM   Result Value Ref Range    pH, Art i-STAT 7.246 (L) 7.350 - 7.450    pCO2, Art i-STAT 58.7 (H) 36.0 - 44.0 mm HG    pO2, ART i-STAT 102.0 75.0 - 129.0 mm HG    BE, i-STAT -2 -2 - 3 mmol/L    HCO3, Art i-STAT 25.5 22.0 - 28.0 mmol/L    CO2, i-STAT 27 21 - 32 mmol/L    O2 Sat, i-STAT 97 (H) 60 - 85 %    SODIUM, I-STAT 144 136 - 145 mmol/l    Potassium, i-STAT 3.3 (L) 3.5 - 5.3 mmol/L    Calcium, Ionized i-STAT 1.16 1.12 - 1.32 mmol/L    Hct, i-STAT 25 (L) 36.5 - 49.3 %    Hgb, i-STAT 8.5 (L) 12.0 - 17.0 g/dl    Glucose, i-STAT 211 (H) 65 - 140 mg/dl    Specimen Type ARTERIAL    Fingerstick Glucose (POCT)    Collection Time: 09/20/24  4:30 AM   Result Value Ref Range    POC Glucose 166 (H) 65 - 140 mg/dl   POCT Blood Gas (CG8+)    Collection Time: 09/20/24  4:32 AM   Result Value Ref Range    pH, Art i-STAT 7.254 (L) 7.350 - 7.450    pCO2, Art i-STAT 59.9 (H) 36.0 - 44.0 mm HG    pO2, ART i-STAT 110.0 75.0 - 129.0 mm HG    BE, i-STAT -1 -2 - 3 mmol/L    HCO3, Art i-STAT 26.5 22.0 - 28.0 mmol/L    CO2, i-STAT 28 21 - 32 mmol/L    O2 Sat, i-STAT 97 (H) 60 - 85 %    SODIUM, I-STAT 144 136 - 145 mmol/l    Potassium, i-STAT 3.5 3.5 - 5.3 mmol/L    Calcium, Ionized i-STAT 1.24 1.12 - 1.32 mmol/L    Hct, i-STAT 22 (L) 36.5 - 49.3 %    Hgb, i-STAT 7.5 (L) 12.0 - 17.0 g/dl    Glucose, i-STAT 171 (H) 65 - 140 mg/dl    Specimen Type ARTERIAL    ECG 12 lead    Collection Time: 09/20/24  4:34 AM   Result Value Ref Range    Ventricular Rate 74 BPM    Atrial Rate 74 BPM    NV Interval 168 ms    QRSD Interval 90 ms    QT Interval 454 ms    QTC Interval 503 ms    P Axis 70 degrees    QRS Axis -51 degrees    T Wave Axis 237 degrees    Prepare Leukoreduced Platelet Pheresis (1 pheresis product = 6-8 pooled units): 2 Product    Collection Time: 09/20/24  5:55 AM   Result Value Ref Range    Unit Product Code E4279C81     Unit Number [id number]     Unit ABO O     Unit RH POS     Unit Dispense Status Presumed Trans     Unit Product Volume 300 mL    Unit Product Code U3911S53     Unit Number [id number]     Unit ABO A     Unit RH POS     Unit Dispense Status Presumed Trans     Unit Product Volume 300 mL   Prepare Plasma: 2 Units    Collection Time: 09/20/24  5:55 AM   Result Value Ref Range    Unit Product Code V6419G61     Unit Number [id number]     Unit ABO AB     Unit RH POS     Unit Dispense Status Presumed Trans     Unit Product Volume 280 mL    Unit Product Code Z6886M48     Unit Number [id number]     Unit ABO AB     Unit RH NEG     Unit Dispense Status Presumed Trans     Unit Product Volume 280 ml   Prepare cryoprecipitate (1 product = 5 pooled units): 2 Product    Collection Time: 09/20/24  5:55 AM   Result Value Ref Range    Unit Product Code B3059X17     Unit Number [id number]     Unit ABO O     Unit RH POS     Unit Dispense Status Presumed Trans     Unit Product Volume 125 ml    Unit Product Code L9523B76     Unit Number [id number]     Unit ABO O     Unit RH POS     Unit Dispense Status Presumed Trans     Unit Product Volume 125 ml   Prepare Leukoreduced Platelet Pheresis (1 pheresis product = 6-8 pooled units): 1 Product    Collection Time: 09/20/24  5:55 AM   Result Value Ref Range    Unit Product Code N0438L90     Unit Number [id number]     Unit ABO O     Unit RH POS     Unit Dispense Status Presumed Trans     Unit Product Volume 300 mL   Prepare Plasma: 1 Units    Collection Time: 09/20/24  5:55 AM   Result Value Ref Range    Unit Product Code U0000M64     Unit Number [id number]     Unit ABO O     Unit RH POS     Unit Dispense Status Presumed Trans     Unit Product Volume 280 ml   Prepare  cryoprecipitate (1 product = 5 pooled units): 1 Product    Collection Time: 09/20/24  5:55 AM   Result Value Ref Range    Unit Product Code M3766V81     Unit Number [id number]     Unit ABO O     Unit RH NEG     Unit Dispense Status Presumed Trans     Unit Product Volume 125 ml   Fingerstick Glucose (POCT)    Collection Time: 09/20/24  6:09 AM   Result Value Ref Range    POC Glucose 128 65 - 140 mg/dl   Basic metabolic panel    Collection Time: 09/20/24  7:01 AM   Result Value Ref Range    Sodium 145 135 - 147 mmol/L    Potassium 3.9 3.5 - 5.3 mmol/L    Chloride 109 (H) 96 - 108 mmol/L    CO2 27 21 - 32 mmol/L    ANION GAP 9 4 - 13 mmol/L    BUN 20 5 - 25 mg/dL    Creatinine 1.22 0.60 - 1.30 mg/dL    Glucose 120 65 - 140 mg/dL    Calcium 8.6 8.4 - 10.2 mg/dL    eGFR 63 ml/min/1.73sq m   Magnesium -AM POD #1    Collection Time: 09/20/24  7:01 AM   Result Value Ref Range    Magnesium 3.0 (H) 1.9 - 2.7 mg/dL   Lipid panel    Collection Time: 09/20/24  7:01 AM   Result Value Ref Range    Cholesterol 127 See Comment mg/dL    Triglycerides 51 See Comment mg/dL    HDL, Direct 39 (L) >=40 mg/dL    LDL Calculated 78 0 - 100 mg/dL    Non-HDL-Chol (CHOL-HDL) 88 mg/dl   Fibrinogen  - If Chest Tube Losses > 200 mL/hr in First Hour Postop    Collection Time: 09/20/24  7:09 AM   Result Value Ref Range    Fibrinogen 415 206 - 523 mg/dL   aPTT - If Chest Tube Losses > 200 mL/hr in First Hour    Collection Time: 09/20/24  7:12 AM   Result Value Ref Range    PTT 31 23 - 34 seconds   Protime - INR - If Chest Tube Losses > 200 mL/hr in First Hour    Collection Time: 09/20/24  7:12 AM   Result Value Ref Range    Protime 10.3 (L) 12.3 - 15.0 seconds    INR 0.70 (L) 0.85 - 1.19   Prepare Leukoreduced RBC: 4 Units    Collection Time: 09/20/24  8:05 AM   Result Value Ref Range    Unit Product Code G6438G83     Unit Number [id number]     Unit ABO O     Unit RH POS     Crossmatch Compatible     Unit Dispense Status Return to Inv      Unit Product Volume 350 mL    Unit Product Code S6974N99     Unit Number [id number]     Unit ABO O     Unit RH POS     Crossmatch Compatible     Unit Dispense Status Return to Inv     Unit Product Volume 350 mL    Unit Product Code Q5814E32     Unit Number [id number]     Unit ABO O     Unit RH POS     Crossmatch Compatible     Unit Dispense Status Return to Inv     Unit Product Volume 350 mL    Unit Product Code U2054C61     Unit Number [id number]     Unit ABO O     Unit RH POS     Crossmatch Compatible     Unit Dispense Status Return to Inv     Unit Product Volume 350 mL   Fingerstick Glucose (POCT)    Collection Time: 09/20/24  8:30 AM   Result Value Ref Range    POC Glucose 141 (H) 65 - 140 mg/dl   Hemoglobin A1C    Collection Time: 09/20/24  8:33 AM   Result Value Ref Range    Hemoglobin A1C 6.0 (H) Normal 4.0-5.6%; PreDiabetic 5.7-6.4%; Diabetic >=6.5%; Glycemic control for adults with diabetes <7.0% %     mg/dl   CBC and Platelet    Collection Time: 09/20/24  8:33 AM   Result Value Ref Range    WBC 9.88 4.31 - 10.16 Thousand/uL    RBC 3.58 (L) 3.88 - 5.62 Million/uL    Hemoglobin 10.5 (L) 12.0 - 17.0 g/dL    Hematocrit 31.7 (L) 36.5 - 49.3 %    MCV 89 82 - 98 fL    MCH 29.3 26.8 - 34.3 pg    MCHC 33.1 31.4 - 37.4 g/dL    RDW 13.2 11.6 - 15.1 %    Platelets 176 149 - 390 Thousands/uL    MPV 9.0 8.9 - 12.7 fL   CBC-AM POD #1    Collection Time: 09/20/24  8:47 AM   Result Value Ref Range    WBC 10.38 (H) 4.31 - 10.16 Thousand/uL    RBC 3.77 (L) 3.88 - 5.62 Million/uL    Hemoglobin 11.0 (L) 12.0 - 17.0 g/dL    Hematocrit 33.3 (L) 36.5 - 49.3 %    MCV 88 82 - 98 fL    MCH 29.2 26.8 - 34.3 pg    MCHC 33.0 31.4 - 37.4 g/dL    RDW 13.2 11.6 - 15.1 %    Platelets 182 149 - 390 Thousands/uL    MPV 9.2 8.9 - 12.7 fL   Fingerstick Glucose (POCT)    Collection Time: 09/20/24 11:13 AM   Result Value Ref Range    POC Glucose 126 65 - 140 mg/dl   Hemoglobin and hematocrit, blood    Collection Time:  09/20/24 12:57 PM   Result Value Ref Range    Hemoglobin 10.9 (L) 12.0 - 17.0 g/dL    Hematocrit 32.7 (L) 36.5 - 49.3 %   Potassium    Collection Time: 09/20/24 12:57 PM   Result Value Ref Range    Potassium 4.3 3.5 - 5.3 mmol/L   Fingerstick Glucose (POCT)    Collection Time: 09/20/24  1:44 PM   Result Value Ref Range    POC Glucose 157 (H) 65 - 140 mg/dl   Fingerstick Glucose (POCT)    Collection Time: 09/20/24  3:49 PM   Result Value Ref Range    POC Glucose 127 65 - 140 mg/dl   Fingerstick Glucose (POCT)    Collection Time: 09/20/24  5:50 PM   Result Value Ref Range    POC Glucose 126 65 - 140 mg/dl   Type and screen    Collection Time: 09/20/24  7:46 PM   Result Value Ref Range    ABO Grouping O     Rh Factor Positive     Antibody Screen Negative     Specimen Expiration Date [id number]    Basic metabolic panel    Collection Time: 09/20/24  7:46 PM   Result Value Ref Range    Sodium 137 135 - 147 mmol/L    Potassium 3.9 3.5 - 5.3 mmol/L    Chloride 103 96 - 108 mmol/L    CO2 27 21 - 32 mmol/L    ANION GAP 7 4 - 13 mmol/L    BUN 14 5 - 25 mg/dL    Creatinine 0.85 0.60 - 1.30 mg/dL    Glucose 126 65 - 140 mg/dL    Calcium 7.5 (L) 8.4 - 10.2 mg/dL    eGFR 94 ml/min/1.73sq m   Magnesium    Collection Time: 09/20/24  7:46 PM   Result Value Ref Range    Magnesium 1.9 1.9 - 2.7 mg/dL   Phosphorus    Collection Time: 09/20/24  7:46 PM   Result Value Ref Range    Phosphorus 3.4 2.3 - 4.1 mg/dL   Calcium, ionized    Collection Time: 09/20/24  7:46 PM   Result Value Ref Range    Calcium, Ionized 1.01 (L) 1.12 - 1.32 mmol/L   Fingerstick Glucose (POCT)    Collection Time: 09/20/24  7:48 PM   Result Value Ref Range    POC Glucose 124 65 - 140 mg/dl   Fingerstick Glucose (POCT)    Collection Time: 09/20/24 10:06 PM   Result Value Ref Range    POC Glucose 132 65 - 140 mg/dl   Fingerstick Glucose (POCT)    Collection Time: 09/21/24 12:37 AM   Result Value Ref Range    POC Glucose 135 65 - 140 mg/dl   Fingerstick Glucose (POCT)     Collection Time: 09/21/24  1:57 AM   Result Value Ref Range    POC Glucose 124 65 - 140 mg/dl   Fingerstick Glucose (POCT)    Collection Time: 09/21/24  3:56 AM   Result Value Ref Range    POC Glucose 116 65 - 140 mg/dl   Basic metabolic panel    Collection Time: 09/21/24  4:35 AM   Result Value Ref Range    Sodium 135 135 - 147 mmol/L    Potassium 4.3 3.5 - 5.3 mmol/L    Chloride 101 96 - 108 mmol/L    CO2 27 21 - 32 mmol/L    ANION GAP 7 4 - 13 mmol/L    BUN 15 5 - 25 mg/dL    Creatinine 0.90 0.60 - 1.30 mg/dL    Glucose 118 65 - 140 mg/dL    Calcium 7.8 (L) 8.4 - 10.2 mg/dL    eGFR 91 ml/min/1.73sq m   CBC    Collection Time: 09/21/24  4:35 AM   Result Value Ref Range    WBC 15.38 (H) 4.31 - 10.16 Thousand/uL    RBC 3.31 (L) 3.88 - 5.62 Million/uL    Hemoglobin 9.6 (L) 12.0 - 17.0 g/dL    Hematocrit 29.6 (L) 36.5 - 49.3 %    MCV 89 82 - 98 fL    MCH 29.0 26.8 - 34.3 pg    MCHC 32.4 31.4 - 37.4 g/dL    RDW 13.4 11.6 - 15.1 %    Platelets 161 149 - 390 Thousands/uL    MPV 9.4 8.9 - 12.7 fL   Magnesium    Collection Time: 09/21/24  4:35 AM   Result Value Ref Range    Magnesium 2.2 1.9 - 2.7 mg/dL   Protime-INR    Collection Time: 09/21/24  4:35 AM   Result Value Ref Range    Protime 15.4 (H) 12.3 - 15.0 seconds    INR 1.20 (H) 0.85 - 1.19   Fingerstick Glucose (POCT)    Collection Time: 09/21/24  6:06 AM   Result Value Ref Range    POC Glucose 127 65 - 140 mg/dl   Fingerstick Glucose (POCT)    Collection Time: 09/21/24  8:05 AM   Result Value Ref Range    POC Glucose 148 (H) 65 - 140 mg/dl   Fingerstick Glucose (POCT)    Collection Time: 09/21/24 10:01 AM   Result Value Ref Range    POC Glucose 108 65 - 140 mg/dl   Fingerstick Glucose (POCT)    Collection Time: 09/21/24 11:01 AM   Result Value Ref Range    POC Glucose 102 65 - 140 mg/dl   Fingerstick Glucose (POCT)    Collection Time: 09/21/24  3:53 PM   Result Value Ref Range    POC Glucose 126 65 - 140 mg/dl   Fingerstick Glucose (POCT)    Collection Time:  09/21/24  8:39 PM   Result Value Ref Range    POC Glucose 119 65 - 140 mg/dl   Basic metabolic panel    Collection Time: 09/22/24  4:25 AM   Result Value Ref Range    Sodium 134 (L) 135 - 147 mmol/L    Potassium 4.5 3.5 - 5.3 mmol/L    Chloride 100 96 - 108 mmol/L    CO2 26 21 - 32 mmol/L    ANION GAP 8 4 - 13 mmol/L    BUN 27 (H) 5 - 25 mg/dL    Creatinine 0.97 0.60 - 1.30 mg/dL    Glucose 138 65 - 140 mg/dL    Calcium 8.5 8.4 - 10.2 mg/dL    eGFR 83 ml/min/1.73sq m   Magnesium    Collection Time: 09/22/24  4:25 AM   Result Value Ref Range    Magnesium 2.1 1.9 - 2.7 mg/dL   CBC (With Platelets)    Collection Time: 09/22/24  4:25 AM   Result Value Ref Range    WBC 19.75 (H) 4.31 - 10.16 Thousand/uL    RBC 3.36 (L) 3.88 - 5.62 Million/uL    Hemoglobin 9.8 (L) 12.0 - 17.0 g/dL    Hematocrit 29.8 (L) 36.5 - 49.3 %    MCV 89 82 - 98 fL    MCH 29.2 26.8 - 34.3 pg    MCHC 32.9 31.4 - 37.4 g/dL    RDW 13.3 11.6 - 15.1 %    Platelets 187 149 - 390 Thousands/uL    MPV 10.0 8.9 - 12.7 fL   Fingerstick Glucose (POCT)    Collection Time: 09/22/24  5:44 AM   Result Value Ref Range    POC Glucose 133 65 - 140 mg/dl   Prepare Leukoreduced RBC: 4 Units    Collection Time: 09/22/24  6:55 AM   Result Value Ref Range    Unit Product Code O7464W90     Unit Number [id number]     Unit ABO O     Unit RH POS     Crossmatch Compatible     Unit Dispense Status Return to Inv     Unit Product Volume 350 mL    Unit Product Code S1981W44     Unit Number [id number]     Unit ABO O     Unit RH POS     Crossmatch Compatible     Unit Dispense Status Return to Inv     Unit Product Volume 350 mL    Unit Product Code P7752I52     Unit Number [id number]     Unit ABO O     Unit RH POS     Crossmatch Compatible     Unit Dispense Status Return to Inv     Unit Product Volume 350 mL    Unit Product Code W4239N43     Unit Number [id number]     Unit ABO O     Unit RH POS     Crossmatch Compatible     Unit Dispense Status Return to Inv      Unit Product Volume 350 mL     Imaging: I have personally reviewed pertinent reports.    EKG: Sinus bradycardia inferolateral T wave inversions      Code Status: Level 1 - Full Code  Advance Directive and Living Will:      Power of :    POLST:      Counseling / Coordination of Care  Total floor / unit time spent today 45 minutes.  Greater than 50% of total time was spent with the patient and / or family counseling and / or coordination of care.

## 2024-09-22 NOTE — PROCEDURES
09/22/24    Procedure: Chest tube removal    Chest tubes removed in routine fashion without incident.  Insertion site dressed with Acticoat.  Santos SADE Jaime tolerated the procedure well.  Nurse notified.    SIGNATURE: Keily Payton PA-C  DATE: September 22, 2024  TIME: 8:38 AM   09/22/24

## 2024-09-22 NOTE — ASSESSMENT & PLAN NOTE
-Type A dissection involving the entire aorta and extending to the level of the distal common iliac artery and distal left external iliac artery.  Proximally extends from the level of the sinoatrial junction however likely involves aortic root.  Complex configuration of the distal flap with several entry and reentry tears.   - s/p urgent repair 9/19

## 2024-09-22 NOTE — PROGRESS NOTES
Progress Note - Cardiac Surgery   Santos Sandoval 59 y.o. male MRN: [id number]  Unit/Bed#: PPHP-313-01 Encounter: [id number]    Aortic dissection. S/P ascending aorta/hemiarch, re-suspension of the aortic valve, and frozen elephant trunk; POD # 3      24 Hour Events: Transferred out of ICU. Given Ativan x1 for agitation this AM. No other events. C/o wanting to go home. Voiding well s/p jacobson removal. (+) flatus/BM post-op. Ambulating well, home at discharge. Pain controlled.     Medications:   Scheduled Meds:  Current Facility-Administered Medications   Medication Dose Route Frequency Provider Last Rate    acetaminophen  975 mg Oral Q6H While awake Lora Murdock PA-C      amiodarone  200 mg Oral Q8H [facility] Lora Murdock PA-C      aspirin  325 mg Oral Daily oLra Murdock PA-C      atorvastatin  80 mg Oral Daily With Dinner Lora Murdock PA-C      docusate sodium  100 mg Oral BID Sukumar Hillman PA-C      folic acid  1 mg Oral Daily Sukumar Hillman PA-C      furosemide  40 mg Intravenous Daily Sukumar Hillman PA-C      heparin (porcine)  5,000 Units Subcutaneous Q8H [facility] Orlin Byrd PA-C      insulin lispro  1-6 Units Subcutaneous TID AC Sukumar Hillman PA-C      insulin lispro  1-6 Units Subcutaneous HS Sukumar Hillman PA-C      lisinopril  5 mg Oral Daily Sukumar Hillman PA-C      metoprolol tartrate  25 mg Oral Q12H [facility] Shannon Serrano PA-C      multivitamin-minerals  1 tablet Oral Daily Sukumar Hillman PA-C      mupirocin  1 Application Nasal Q12H [facility] Lora Murdock PA-C      ondansetron  4 mg Intravenous Q6H PRN Lora Murdock PA-C      oxyCODONE  2.5 mg Oral Q4H PRN Lora Murdock PA-C      Or    oxyCODONE  5 mg Oral Q4H PRN Lora Murdock PA-C      pantoprazole  40 mg Oral Early Morning Lora Murdock PA-C      polyethylene glycol  17 g Oral Daily Lora Murdock PA-C      potassium chloride  20 mEq Oral Daily Sukumar Hillman PA-C      temazepam  15 mg Oral HS  PRN Sukumar Hillman PA-C      thiamine  100 mg Oral Daily Sukumar Hillman PA-C       Continuous Infusions:   PRN Meds:.  ondansetron    oxyCODONE **OR** oxyCODONE    temazepam    Vitals:   Vitals:    09/22/24 0030 09/22/24 0307 09/22/24 0400 09/22/24 0533   BP:  158/79     BP Location:       Pulse:  69     Resp:  (!) 31     Temp:  97.5 °F (36.4 °C)     TempSrc:       SpO2: 97% 97% 96%    Weight:    81.8 kg (180 lb 5.4 oz)     Bp x24hrs: 120-150/70-80    Telemetry: NSR w/ PACs; Heart Rate: 78; no events/24hrs    Respiratory:   SpO2: SpO2: 96 %, SpO2 Activity: SpO2 Activity: At Rest, SpO2 Device: O2 Device: Nasal cannula; 3 LPM    Intake/Output:     Intake/Output Summary (Last 24 hours) at 9/22/2024 0718  Last data filed at 9/22/2024 0704  Gross per 24 hour   Intake 306.34 ml   Output 4310 ml   Net -4003.66 ml      UOP - 750cc/8hrs; 3800cc/24hrs    Stool - 1/24hrs    Weights:   Weight (last 2 days)       Date/Time Weight    09/22/24 0533 81.8 (180.34)    09/21/24 0600 85.4 (188.27)    09/20/24 0426 88.1 (194.23)          Admission weight: 88.1kg - down 6.3kg from admission weight    Chest tube Output:    Mediastinal tubes: none mL/8 hours  110 mL/24 hours w/ 1 unmeasured shift            Results:   Results from last 7 days   Lab Units 09/22/24  0425 09/21/24  0435 09/20/24  1257 09/20/24  0847   WBC Thousand/uL 19.75* 15.38*  --  10.38*   HEMOGLOBIN g/dL 9.8* 9.6* 10.9* 11.0*   HEMATOCRIT % 29.8* 29.6* 32.7* 33.3*   PLATELETS Thousands/uL 187 161  --  182     Results from last 7 days   Lab Units 09/22/24  0425 09/21/24  0435 09/20/24  1946 09/20/24  0701 09/20/24  0432   SODIUM mmol/L 134* 135 137   < >  --    POTASSIUM mmol/L 4.5 4.3 3.9   < >  --    CHLORIDE mmol/L 100 101 103   < >  --    CO2 mmol/L 26 27 27   < >  --    CO2, I-STAT mmol/L  --   --   --   --  28   BUN mg/dL 27* 15 14   < >  --    CREATININE mg/dL 0.97 0.90 0.85   < >  --    GLUCOSE, ISTAT mg/dl  --   --   --   --  171*   CALCIUM mg/dL 8.5 7.8* 7.5*    < >  --     < > = values in this interval not displayed.     Recent Labs     09/22/24  0425   MG 2.1     Results from last 7 days   Lab Units 09/21/24  0435 09/20/24  0712 09/20/24  0350   INR  1.20* 0.70* 0.76*   PTT seconds  --  31 35*     Point of care glucose: 133 - 119 - 126 - 102 - 108 - 148 - 127    Studies:  No new studies    No pertinent imaging studies reviewed.    Invasive Lines/Tubes:  Invasive Devices       Central Venous Catheter Line  Duration             CVC Central Lines 09/19/24 2 days              Peripheral Intravenous Line  Duration             Peripheral IV 09/19/24 Left Antecubital 2 days    Peripheral IV 09/19/24 Right Antecubital 2 days    Peripheral IV 09/19/24 Right Arm 2 days              Drain  Duration             Chest Tube 1 Anterior;Mediastinal 32 Fr. 2 days    Chest Tube 2 Posterior;Mediastinal 32 Fr. 2 days                    Physical Exam:    General:  appears agitated/antsy, frequent movement in chair w/ desire to get up  HEENT/NECK:  Normocephalic. Atraumatic.  No jugular venous distention.    Cardiac: Regular rate and rhythm and No murmurs/rubs/gallops  Pulmonary:   good inspiratory effort, equal expansion b/l, decreased breathe sounds at bases otherwise CTA, frequent productive coughing (smoking up to admission)  Abdomen:  Non-tender, Non-distended, and Normal bowel sounds  Incisions: Sternum is stable.  Incision dressed with Acticoat.  No erythema or drainage and axillary cannulation site incision dressed with Acticoat.  No erythema or drainage  Extremities: Extremities warm/dry and Trace edema B/L  Neuro: Alert and oriented X 3  Skin: Warm/Dry, without rashes or lesions.    Assessment:  Principal Problem:    Type 1 dissection of ascending aorta (HCC)  Active Problems:    Essential hypertension    Smoking    Hx of colonoscopy with polypectomy    S/P aortic dissection repair    Acute blood loss as cause of postoperative anemia       Aortic dissection. S/P ascending  aorta/hemiarch, re-suspension of the aortic valve, and frozen elephant trunk; POD # 3    Plan:    Cardiac:     Emergent surgical admission for aortic dissection  Normal ventricular systolic function, EF 50%    NSR; HR well-controlled    Increase to Lopressor, 50mg PO BID    ACE inhibitor/ARB indicated; Continue Lisinopril, 5mg PO Daily    Continue prophylactic Amiodarone, 200 mg PO TID    Continue ASA and Statin therapy    Epicardial pacing wires no longer required.  Remove today    Maintain central IV access today for medications requiring central IV access    Continue Arixtra for DVT prophylaxis    Pulmonary:     Acute post-op pulmonary insufficiency; Requiring 3 liters via nasal cannula, secondary to history of tobacco use, atelectasis, pulmonary vascular congestion, and poor inspiratory effort secondary to pain. Continue incentive spirometry/coughing/deep breathing exercises.  Wean supplemental oxygen as tolerated for saturation > 90%    Chest tube drainage diminished; D/C today    Renal:     Normal preoperative renal function    Intake/Output net: (-)3603.7 mL/24 hours    Diuretic Regimen:  Transition to  PO Torsemide, 10 mg QD  Decrease to Potassium Chloride 10 mEq PO QD    Neuro:    Neurologically intact; No active issues     Incisional pain well controlled   Continue tylenol, 975 mg PO q 8, standing dose   Continue oxycodone, 2.5 to 5 mg PO q 4 hours prn pain     Continue thiamine/folic acid/multivitamin for ETOH use PTA, Ativan x1/24hrs    GI:    Cardiac diet, with 1800 mL fluid restriction    Tolerating diet without complaint    Continue stool softeners and prn suppository    Continue GI prophylaxis    Endo:     Pre-Op Hgb A1C: 6.0    Patient has been transitioned from continuous insulin infusion to intermittent subcutaneous dosing  Serum glucose well controlled on insulin sliding scale coverage    7    Hematology:     Post-operative acute blood loss anemia; Hemoglobin 9.8; trend prn  Leukocytosis;  Afebrile with no signs of infection; Trend CBC prn   Hyponatremia, A&Ox3    8.   Disposition:        Not yet medically cleared for discharge, pending medical progression (O2 wean, transition to oral diuretics)      VTE Pharmacologic Prophylaxis: Fondaparinux (Arixtra)  VTE Mechanical Prophylaxis: sequential compression device    Collaborative rounds completed with supervising physician  Plan of care discussed with bedside nurse    SIGNATURE: Keily Payton PA-C  DATE: September 22, 2024  TIME: 7:18 AM

## 2024-09-22 NOTE — ASSESSMENT & PLAN NOTE
-Postop day #3 ascending aorta/hemiarch, resuspension of aortic valve and frozen elephant trunk  -On amiodarone 200 mg 3 times daily/aspirin 325 mg daily/atorvastatin 20 mg daily  -On lisinopril 5 mg daily/metoprolol tartrate 50 mg twice daily  -Demadex 10 mg daily for postop volume management  -Chest tube remains.  Broderick out.  -recommend cardiology follow up

## 2024-09-22 NOTE — ASSESSMENT & PLAN NOTE
-Reports longstanding history of hypertension for which she was noncompliant with medical management  -Currently on lisinopril and metoprolol as mentioned. /83

## 2024-09-23 LAB
ANION GAP SERPL CALCULATED.3IONS-SCNC: 10 MMOL/L (ref 4–13)
ATRIAL RATE: 60 BPM
BUN SERPL-MCNC: 30 MG/DL (ref 5–25)
CALCIUM SERPL-MCNC: 8.2 MG/DL (ref 8.4–10.2)
CHLORIDE SERPL-SCNC: 101 MMOL/L (ref 96–108)
CO2 SERPL-SCNC: 24 MMOL/L (ref 21–32)
CREAT SERPL-MCNC: 0.92 MG/DL (ref 0.6–1.3)
ERYTHROCYTE [DISTWIDTH] IN BLOOD BY AUTOMATED COUNT: 13.2 % (ref 11.6–15.1)
GFR SERPL CREATININE-BSD FRML MDRD: 89 ML/MIN/1.73SQ M
GLUCOSE SERPL-MCNC: 103 MG/DL (ref 65–140)
GLUCOSE SERPL-MCNC: 110 MG/DL (ref 65–140)
GLUCOSE SERPL-MCNC: 116 MG/DL (ref 65–140)
GLUCOSE SERPL-MCNC: 74 MG/DL (ref 65–140)
GLUCOSE SERPL-MCNC: 95 MG/DL (ref 65–140)
HCT VFR BLD AUTO: 30.1 % (ref 36.5–49.3)
HGB BLD-MCNC: 10 G/DL (ref 12–17)
MCH RBC QN AUTO: 29.9 PG (ref 26.8–34.3)
MCHC RBC AUTO-ENTMCNC: 33.2 G/DL (ref 31.4–37.4)
MCV RBC AUTO: 90 FL (ref 82–98)
P AXIS: 46 DEGREES
PLATELET # BLD AUTO: 201 THOUSANDS/UL (ref 149–390)
PMV BLD AUTO: 9.5 FL (ref 8.9–12.7)
POTASSIUM SERPL-SCNC: 3.8 MMOL/L (ref 3.5–5.3)
PR INTERVAL: 160 MS
QRS AXIS: -16 DEGREES
QRSD INTERVAL: 108 MS
QT INTERVAL: 456 MS
QTC INTERVAL: 456 MS
RBC # BLD AUTO: 3.34 MILLION/UL (ref 3.88–5.62)
SODIUM SERPL-SCNC: 135 MMOL/L (ref 135–147)
T WAVE AXIS: -51 DEGREES
VENTRICULAR RATE: 60 BPM
WBC # BLD AUTO: 15.79 THOUSAND/UL (ref 4.31–10.16)

## 2024-09-23 PROCEDURE — 85027 COMPLETE CBC AUTOMATED: CPT | Performed by: PHYSICIAN ASSISTANT

## 2024-09-23 PROCEDURE — 82948 REAGENT STRIP/BLOOD GLUCOSE: CPT

## 2024-09-23 PROCEDURE — 99232 SBSQ HOSP IP/OBS MODERATE 35: CPT | Performed by: INTERNAL MEDICINE

## 2024-09-23 PROCEDURE — 99024 POSTOP FOLLOW-UP VISIT: CPT | Performed by: PHYSICIAN ASSISTANT

## 2024-09-23 PROCEDURE — 80048 BASIC METABOLIC PNL TOTAL CA: CPT | Performed by: PHYSICIAN ASSISTANT

## 2024-09-23 PROCEDURE — 93010 ELECTROCARDIOGRAM REPORT: CPT | Performed by: INTERNAL MEDICINE

## 2024-09-23 PROCEDURE — 97110 THERAPEUTIC EXERCISES: CPT

## 2024-09-23 PROCEDURE — 97116 GAIT TRAINING THERAPY: CPT

## 2024-09-23 PROCEDURE — 97530 THERAPEUTIC ACTIVITIES: CPT

## 2024-09-23 RX ORDER — AMLODIPINE BESYLATE 5 MG/1
5 TABLET ORAL DAILY
Status: DISCONTINUED | OUTPATIENT
Start: 2024-09-23 | End: 2024-09-24 | Stop reason: HOSPADM

## 2024-09-23 RX ORDER — GUAIFENESIN 600 MG/1
1200 TABLET, EXTENDED RELEASE ORAL EVERY 12 HOURS SCHEDULED
Status: DISCONTINUED | OUTPATIENT
Start: 2024-09-23 | End: 2024-09-24 | Stop reason: HOSPADM

## 2024-09-23 RX ORDER — LORAZEPAM 0.5 MG/1
0.5 TABLET ORAL ONCE
Status: COMPLETED | OUTPATIENT
Start: 2024-09-23 | End: 2024-09-23

## 2024-09-23 RX ORDER — BISACODYL 5 MG/1
10 TABLET, DELAYED RELEASE ORAL ONCE
Status: COMPLETED | OUTPATIENT
Start: 2024-09-23 | End: 2024-09-23

## 2024-09-23 RX ORDER — HYDRALAZINE HYDROCHLORIDE 20 MG/ML
5 INJECTION INTRAMUSCULAR; INTRAVENOUS EVERY 6 HOURS PRN
Status: DISCONTINUED | OUTPATIENT
Start: 2024-09-23 | End: 2024-09-24 | Stop reason: HOSPADM

## 2024-09-23 RX ORDER — LOSARTAN POTASSIUM 50 MG/1
100 TABLET ORAL DAILY
Status: DISCONTINUED | OUTPATIENT
Start: 2024-09-23 | End: 2024-09-24 | Stop reason: HOSPADM

## 2024-09-23 RX ADMIN — AMLODIPINE BESYLATE 5 MG: 5 TABLET ORAL at 09:27

## 2024-09-23 RX ADMIN — ACETAMINOPHEN 975 MG: 325 TABLET ORAL at 14:30

## 2024-09-23 RX ADMIN — TORSEMIDE 10 MG: 10 TABLET ORAL at 08:01

## 2024-09-23 RX ADMIN — LOSARTAN POTASSIUM 100 MG: 50 TABLET, FILM COATED ORAL at 09:27

## 2024-09-23 RX ADMIN — HEPARIN SODIUM 5000 UNITS: 5000 INJECTION INTRAVENOUS; SUBCUTANEOUS at 21:17

## 2024-09-23 RX ADMIN — ACETAMINOPHEN 975 MG: 325 TABLET ORAL at 08:01

## 2024-09-23 RX ADMIN — HEPARIN SODIUM 5000 UNITS: 5000 INJECTION INTRAVENOUS; SUBCUTANEOUS at 14:31

## 2024-09-23 RX ADMIN — POTASSIUM CHLORIDE 10 MEQ: 750 TABLET, EXTENDED RELEASE ORAL at 08:01

## 2024-09-23 RX ADMIN — GUAIFENESIN 1200 MG: 600 TABLET, EXTENDED RELEASE ORAL at 21:15

## 2024-09-23 RX ADMIN — HEPARIN SODIUM 5000 UNITS: 5000 INJECTION INTRAVENOUS; SUBCUTANEOUS at 05:33

## 2024-09-23 RX ADMIN — LORAZEPAM 0.5 MG: 0.5 TABLET ORAL at 00:41

## 2024-09-23 RX ADMIN — PANTOPRAZOLE SODIUM 40 MG: 40 TABLET, DELAYED RELEASE ORAL at 05:33

## 2024-09-23 RX ADMIN — ASPIRIN 325 MG ORAL TABLET 325 MG: 325 PILL ORAL at 08:01

## 2024-09-23 RX ADMIN — OXYCODONE HYDROCHLORIDE 5 MG: 5 TABLET ORAL at 16:44

## 2024-09-23 RX ADMIN — DOCUSATE SODIUM 100 MG: 100 CAPSULE, LIQUID FILLED ORAL at 17:20

## 2024-09-23 RX ADMIN — ACETAMINOPHEN 975 MG: 325 TABLET ORAL at 21:15

## 2024-09-23 RX ADMIN — AMIODARONE HYDROCHLORIDE 200 MG: 200 TABLET ORAL at 05:34

## 2024-09-23 RX ADMIN — ATORVASTATIN CALCIUM 20 MG: 20 TABLET, FILM COATED ORAL at 17:20

## 2024-09-23 RX ADMIN — FOLIC ACID 1 MG: 1 TABLET ORAL at 08:01

## 2024-09-23 RX ADMIN — POLYETHYLENE GLYCOL 3350 17 G: 17 POWDER, FOR SOLUTION ORAL at 08:01

## 2024-09-23 RX ADMIN — AMIODARONE HYDROCHLORIDE 200 MG: 200 TABLET ORAL at 14:30

## 2024-09-23 RX ADMIN — Medication 1 TABLET: at 08:01

## 2024-09-23 RX ADMIN — BISACODYL 10 MG: 5 TABLET, COATED ORAL at 09:27

## 2024-09-23 RX ADMIN — OXYCODONE HYDROCHLORIDE 5 MG: 5 TABLET ORAL at 02:33

## 2024-09-23 RX ADMIN — GUAIFENESIN 1200 MG: 600 TABLET, EXTENDED RELEASE ORAL at 09:27

## 2024-09-23 RX ADMIN — DOCUSATE SODIUM 100 MG: 100 CAPSULE, LIQUID FILLED ORAL at 08:01

## 2024-09-23 RX ADMIN — METOPROLOL TARTRATE 75 MG: 50 TABLET, FILM COATED ORAL at 21:15

## 2024-09-23 RX ADMIN — METOPROLOL TARTRATE 50 MG: 50 TABLET, FILM COATED ORAL at 08:01

## 2024-09-23 RX ADMIN — LISINOPRIL 10 MG: 10 TABLET ORAL at 08:01

## 2024-09-23 RX ADMIN — THIAMINE HCL TAB 100 MG 100 MG: 100 TAB at 08:01

## 2024-09-23 RX ADMIN — AMIODARONE HYDROCHLORIDE 200 MG: 200 TABLET ORAL at 21:16

## 2024-09-23 NOTE — ASSESSMENT & PLAN NOTE
-Postop day #4 ascending aorta/hemiarch, resuspension of aortic valve and frozen elephant trunk  -On amiodarone 200 mg 3 times daily/aspirin 325 mg daily/atorvastatin 20 mg daily  -On lisinopril 5 mg daily/metoprolol tartrate 50 mg twice daily  -Demadex 10 mg daily for postop volume management  -Chest tube remains, possible discontinuation today.  -EPW reman, possible discontinuation today    -Broderick discontined  -encourage ambulation and incentive spirometry

## 2024-09-23 NOTE — UTILIZATION REVIEW
Continued Stay Review    Date: 9/21/9/23/24                          Current Patient Class: Inpatient  Current Level of Care: med surg telemetry    HPI:59 y.o. male initially admitted on 9/19/24 Aortic Dissection    Assessment/Plan:   9/21: POD #2 s/p ascending aorta and full arch replacement, resuspension on of aortic valve.  Patient arrived to the ICU yesterday morning on levo 5 quickly transitioned to Cardene.  Was initiated on Lopressor 25 last evening and Cardene was weaned to 2.5. DC Jacobson today. Chest tube output remains persistently high, continue chest tubes to suction today. Continue to trend neuro exams, continue ASA, statin and amiodarone. Continue bowel regimen and trend abd exam and bowel function. Monitor electrolytes and replete prn, trend WBC and temps. PT OT eval. Local wound care prn to skin.     9/22: POD #3: Transferred out of ICU. Given Ativan x1 for agitation this AM. No other events. C/o wanting to go home. Voiding well s/p jacobson removal. Positive flatus/BM post-op. Ambulating well, home at discharge. Pain controlled. Continue ASA, statin, remove pacing wires today. Continue Arixtra. Chest tube drainage diminished; D/C today. Cardiac diet, with 1800 mL fluid restriction. Stool softeners, GI ppx. Continue to monitor VS and labs.     9/22 Per Cardiology: Type A aortic dissection: s/p urgent repair and currently doing well.  Continues with chest tubes, EPW discontinued.  Stable on telemetry thus far.  Continue routine post-op care with incentive spirometry, encouraging ambulation, and diuresis for post-op volume overload.  Follow strict I/Os and daily weights.  Cotninue on ASA, statin, and B-blocker. BP remains mildly elevated, currently on lisinopril and metoprolol.  Uptitrate as needed while also diuresing. SB remains without evidence of any heart block.  Follow closely on telemetry while on B-blocker. Mitral regurgitation noted to be mild to moderate on echo, likely secondary to HTN.   Continue on B-blocker and follow as outpatient. Tobacco use: encourage cessation.    9/23: POD #4 SR, on RA. No events overnight. Hypertensive overnight and this AM. /99. Passing gas, no BM yet. Passing urine. Eating and drinking well. Increased lopressor to 75 mg bic, add Norvasc 5 mg qd. Continue above listed meds. Continue to monitor VS and labs. Not yet medically cleared for discharge, pending BP control and BM.    Vital Signs (last 3 days)       Date/Time Temp Pulse Resp BP MAP (mmHg) Arterial Line BP MAP SpO2 FiO2 (%) Calculated FIO2 (%) - Nasal Cannula Nasal Cannula O2 Flow Rate (L/min) O2 Device CO CI Patient Position - Orthostatic VS CVP CVP (mean) [place] Coma Scale Score CIWA-Ar Total Pain    09/23/24 10:37:11 97.8 °F (36.6 °C) 61 18 137/72 94 -- -- 95 % -- -- -- -- -- -- -- -- -- -- -- --    09/23/24 09:26:58 -- 65 -- 127/71 90 -- -- 92 % -- -- -- -- -- -- -- -- -- -- -- --    09/23/24 0801 -- -- -- -- -- -- -- -- -- -- -- -- -- -- -- -- -- -- -- 4    09/23/24 0800 -- 78 -- -- -- -- -- -- -- -- -- -- -- -- -- -- -- -- -- --    09/23/24 0745 -- -- -- -- -- -- -- -- -- -- -- -- -- -- -- -- -- 15 -- No Pain    09/23/24 07:31:58 -- 78 16 180/99 126 -- -- 91 % -- -- -- -- -- -- -- -- -- -- -- --    09/23/24 02:29:06 97.7 °F (36.5 °C) 73 -- 168/95 119 -- -- 93 % -- -- -- -- -- -- -- -- -- -- -- --    09/22/24 23:02:41 98.4 °F (36.9 °C) 54 -- 166/89 115 -- -- 94 % -- -- -- -- -- -- -- -- -- -- -- --    09/22/24 21:57:55 -- 61 16 159/80 106 -- -- 95 % -- -- -- None (Room air) -- -- -- -- -- -- -- 6    09/22/24 2030 -- -- -- -- -- -- -- -- -- -- -- -- -- -- -- -- -- 15 -- No Pain    09/22/24 1956 -- 67 -- -- -- -- -- -- -- -- -- -- -- -- -- -- -- -- -- --    09/22/24 19:10:50 98.3 °F (36.8 °C) 59 -- 167/83 111 -- -- 91 % -- -- -- -- -- -- -- -- -- -- -- --    09/22/24 14:58:55 97.5 °F (36.4 °C) 66 17 143/78 100 -- -- 92 % -- -- -- -- -- -- -- -- -- -- -- --    09/22/24 0936 -- -- -- -- -- -- -- -- -- -- --  -- -- -- -- -- -- -- -- 2    09/22/24 0721 97.6 °F (36.4 °C) -- -- 160/83 110 -- -- -- -- -- -- -- -- -- -- -- -- -- -- --    09/22/24 0400 -- -- -- -- -- -- -- 96 % -- 32 3 L/min Nasal cannula -- -- -- -- -- 15 -- --    09/22/24 0348 -- -- -- -- -- -- -- -- -- -- -- -- -- -- -- -- -- -- -- 9 09/22/24 0307 97.5 °F (36.4 °C) 69 31 158/79 113 -- -- 97 % -- -- -- -- -- -- -- -- -- -- -- --    09/22/24 0030 -- -- -- -- -- -- -- 97 % -- 32 3 L/min Nasal cannula -- -- -- -- -- 15 -- --    09/21/24 2354 -- -- -- -- -- -- -- -- -- -- -- -- -- -- -- -- -- -- -- 9 09/21/24 2028 -- 80 -- 122/82 -- -- -- -- -- -- -- -- -- -- -- -- -- -- -- --    09/21/24 2027 -- -- -- -- -- -- -- -- -- -- -- -- -- -- -- -- -- -- -- 4 09/21/24 2026 -- -- -- -- -- -- -- -- -- -- -- -- -- -- -- -- -- -- -- 4 09/21/24 2000 -- -- -- -- -- -- -- 94 % -- 36 4 L/min Nasal cannula -- -- -- -- -- 15 -- No Pain    09/21/24 1900 -- 78 35 -- -- -- -- 90 % -- -- -- -- -- -- -- -- -- -- -- --    09/21/24 1800 -- 69 24 -- -- -- -- 98 % -- -- -- -- -- -- -- -- -- -- -- --    09/21/24 1750 -- -- -- -- -- -- -- -- -- -- -- -- -- -- -- -- -- -- -- 8    09/21/24 1700 -- 67 42 149/84 110 -- -- 94 % -- -- -- -- -- -- -- -- -- -- -- --    09/21/24 1600 -- 64 28 143/81 107 -- -- 96 % -- -- -- -- -- -- -- -- -- -- -- --    09/21/24 1342 -- -- -- -- -- -- -- -- -- -- -- -- -- -- -- -- -- -- -- 7    09/21/24 1300 98.4 °F (36.9 °C) 77 24 142/76 104 -- -- 94 % -- -- -- -- -- -- -- -- -- -- -- --    09/21/24 1200 98.2 °F (36.8 °C) 75 18 147/71 101 -- -- 94 % -- -- -- -- -- -- -- -- -- 15 -- --    09/21/24 1100 98.8 °F (37.1 °C) 60 20 155/94 118 -- -- 98 % -- -- -- -- -- -- -- -- -- -- -- --    09/21/24 1000 99 °F (37.2 °C) 65 29 128/71 95 -- -- 93 % -- -- -- -- -- -- -- -- -- -- -- --    09/21/24 0949 -- -- -- -- -- -- -- -- -- -- -- -- -- -- -- -- -- -- -- No Pain    09/21/24 0948 -- -- -- -- -- -- -- -- -- -- -- -- -- -- -- -- -- -- -- No Pain    09/21/24 0900  98.8 °F (37.1 °C) 62 28 126/72 94 -- -- 94 % -- -- -- -- -- -- -- -- -- -- -- --    09/21/24 0857 -- -- -- -- -- -- -- -- -- 40 5 L/min Nasal cannula -- -- -- -- -- -- -- --    09/21/24 0853 -- -- -- -- -- -- -- -- -- -- -- -- -- -- -- -- -- -- -- 8    09/21/24 0800 98.2 °F (36.8 °C) 64 25 134/77 99 -- -- 91 % -- -- -- -- -- -- -- -- -- 15 -- --    09/21/24 0700 98.4 °F (36.9 °C) 61 19 129/71 95 -- -- 94 % -- -- -- -- -- -- -- -- -- -- -- --    09/21/24 0600 98.4 °F (36.9 °C) 68 26 119/65 86 -- -- 98 % -- -- -- -- -- -- -- -- -- -- 0 --    09/21/24 0500 -- 64 27 130/72 96 -- -- 93 % -- -- -- -- -- -- -- -- -- -- -- --    09/21/24 0400 97.9 °F (36.6 °C) 64 27 124/75 92 -- -- 94 % -- 28 2 L/min Nasal cannula 4.4 L/min 2.2 L/min/m2 Lying -- 9 mmHg 15 0 --    09/21/24 0300 -- 60 16 127/73 90 -- -- 92 % -- -- -- -- -- -- -- -- 9 mmHg -- -- --    09/21/24 0240 97.5 °F (36.4 °C) -- -- -- -- -- -- -- -- 28 2 L/min Nasal cannula -- -- Lying -- -- -- -- --    09/21/24 0200 -- 62 18 112/71 87 -- -- 92 % -- -- -- -- 4.1 L/min 2 L/min/m2 -- -- 7 mmHg -- 0 --    09/21/24 0100 -- 62 16 124/72 93 -- -- 93 % -- -- -- -- -- -- -- -- 8 mmHg -- -- --    09/21/24 0000 97.7 °F (36.5 °C) 63 15 119/69 88 -- -- 92 % -- 28 2 L/min Nasal cannula 4.1 L/min 2.1 L/min/m2 Lying -- 7 mmHg 15 0 --    09/20/24 2300 97.7 °F (36.5 °C) 74 15 130/68 93 -- -- 87 % -- -- -- -- -- -- -- -- 6 mmHg -- -- --    09/20/24 2259 -- 74 16 134/72 97 -- -- 88 % -- 28 2 L/min Nasal cannula -- -- -- -- 6 mmHg -- -- --    09/20/24 2216 -- -- -- -- -- -- -- -- -- -- -- -- -- -- -- -- -- -- -- 10 - Worst Possible Pain    09/20/24 2207 97.5 °F (36.4 °C) -- -- -- -- -- -- 90 % -- -- -- -- -- -- -- -- -- -- -- --    09/20/24 2200 -- 75 21 133/70 94 -- -- 88 % -- -- -- -- 5.7 L/min 2.8 L/min/m2 -- -- 4 mmHg -- 6 --    09/20/24 2100 -- 77 17 145/77 105 -- -- 93 % -- -- -- -- -- -- -- -- 5 mmHg -- -- --    09/20/24 2000 97.5 °F (36.4 °C) 74 18 129/71 92 -- -- 90 % -- -- --  None (Room air) 4.6 L/min 2.3 L/min/m2 Lying -- 5 mmHg 15 1 No Pain    09/20/24 1900 -- 78 25 131/66 93 -- -- 89 % -- -- -- -- -- -- -- -- 8 mmHg -- -- --    09/20/24 1800 -- 72 18 120/69 89 -- 91 mmHg 92 % -- -- -- -- 5.7 L/min 2.8 L/min/m2 -- -- 6 mmHg -- -- --    09/20/24 1700 -- 75 21 104/62 78 -- -- 90 % -- -- -- -- -- -- -- -- 9 mmHg -- -- --    09/20/24 1600 96.4 °F (35.8 °C) 81 28 117/56 78 -- -- 91 % -- -- -- None (Room air) 4.1 L/min 2.1 L/min/m2 Lying -- 10 mmHg 15 0 No Pain    09/20/24 1500 -- 73 14 139/71 98 -- -- 90 % -- -- -- -- -- -- -- -- 6 mmHg -- -- --    09/20/24 1413 -- -- -- -- -- -- -- -- -- -- -- -- -- -- -- -- -- -- -- 3    09/20/24 1400 -- 75 19 118/71 90 -- -- 92 % -- -- -- -- 5.6 L/min 2.8 L/min/m2 -- -- 7 mmHg -- 0 --    09/20/24 1300 -- 68 16 126/78 96 -- -- 94 % -- -- -- -- -- -- -- -- 7 mmHg -- -- --    09/20/24 1200 96.4 °F (35.8 °C) 73 16 125/68 89 -- -- 91 % -- -- -- None (Room air) 5 L/min 2.5 L/min/m2 Lying -- 6 mmHg 15 0 No Pain    09/20/24 1130 97.8 °F (36.6 °C) -- -- -- -- -- -- -- -- -- -- -- -- -- -- -- -- -- -- --    09/20/24 1100 -- -- -- -- -- -- -- -- -- -- -- -- -- -- -- -- -- -- 1 --    09/20/24 1001 -- -- -- -- -- -- -- 92 % -- -- -- None (Room air) -- -- -- -- -- -- -- --    09/20/24 0923 98 °F (36.7 °C) 76 14 -- -- -- -- 95 % -- -- -- -- 5.5 L/min 2.8 L/min/m2 -- -- 5 mmHg -- -- --    09/20/24 0922 -- -- -- 133/69 92 -- -- -- -- -- -- -- -- -- -- -- -- -- -- --    09/20/24 0801 98.1 °F (36.7 °C) 85 19 -- -- 124/62 81 mmHg 96 % -- 36 4 L/min Nasal cannula 5.4 L/min 2.7 L/min/m2 -- -- 5 mmHg -- -- 2    09/20/24 0800 -- -- -- -- -- -- -- -- -- -- -- -- -- -- -- -- -- 15 -- --    09/20/24 0705 97.7 °F (36.5 °C) 80 10 -- -- 119/62 79 mmHg 98 % 50 -- -- Ventilator 5.1 L/min 2.5 L/min/m2 -- -- 8 mmHg -- -- --    09/20/24 0700 -- 85 20 -- -- 107/56 72 mmHg 99 % -- -- -- -- -- -- -- -- 5 mmHg -- -- --    09/20/24 0629 -- -- -- -- -- -- -- -- -- -- -- -- -- -- -- -- -- 11 --  Med Not Given for Pain - for MAR use only    09/20/24 0615 -- -- -- -- -- -- -- -- -- -- -- -- -- -- -- -- -- 3 -- --    09/20/24 0600 96.4 °F (35.8 °C) 75 13 -- -- 113/57 75 mmHg 96 % -- -- -- -- 5.4 L/min 2.7 L/min/m2 -- -- 4 mmHg 3 -- --    09/20/24 0545 -- -- -- -- -- -- -- -- -- -- -- -- -- -- -- -- -- 3 -- --    09/20/24 0530 -- -- -- -- -- -- -- -- -- -- -- -- -- -- -- -- -- 3 -- --    09/20/24 0515 -- -- -- -- -- -- -- -- -- -- -- -- -- -- -- -- -- 3 -- --    09/20/24 0500 96.8 °F (36 °C) 75 23 -- -- 131/62 83 mmHg 99 % -- -- -- -- 5.9 L/min 2.9 L/min/m2 -- -- 5 mmHg 3 -- --    09/20/24 0445 -- -- -- -- -- -- -- -- -- -- -- -- -- -- -- -- -- 3 -- --    09/20/24 0443 96.8 °F (36 °C) 74 22 -- -- 127/56 77 mmHg 99 % 70 -- -- Ventilator 6.4 L/min 3.2 L/min/m2 -- -- 11 mmHg -- -- --    09/20/24 0441 -- -- -- -- -- -- -- 98 % -- -- -- -- -- -- -- -- -- -- -- --    09/20/24 0425 -- -- -- -- -- -- -- -- -- -- -- -- -- -- -- 4 mmHg -- -- -- --          Weight (last 2 days)       Date/Time Weight    09/23/24 0600 78.9 (173.94)    09/22/24 0533 81.8 (180.34)    09/21/24 0600 85.4 (188.27)           CIWA-Ar Score       Row Name 09/21/24 0600 09/21/24 0400 09/21/24 0200       CIWA-Ar    /65 -- --    Pulse 68 -- --    Nausea and Vomiting 0 0 0    Tactile Disturbances 0 0 0    Tremor 0 0 0    Auditory Disturbances 0 0 0    Paroxysmal Sweats 0 0 0    Visual Disturbances 0 0 0    Anxiety 0 0 0    Headache, Fullness in Head 0 0 0    Agitation 0 0 0    Orientation and Clouding of Sensorium 0 0 0    CIWA-Ar Total 0 -- 0      Row Name 09/21/24 0000 09/20/24 2200 09/20/24 2000       CIWA-Ar    Nausea and Vomiting 0 1 1    Tactile Disturbances 0 0 0    Tremor 0 0 0    Auditory Disturbances 0 0 0    Paroxysmal Sweats 0 0 0    Visual Disturbances 0 0 0    Anxiety 0 0 0    Headache, Fullness in Head 0 4 0    Agitation 0 1 0    Orientation and Clouding of Sensorium 0 0 0    CIWA-Ar Total 0 6 1      Row Name 09/20/24 1600  09/20/24 1400 09/20/24 1200       CIWA-Ar    Nausea and Vomiting 0 0 0    Tactile Disturbances 0 0 0    Tremor 0 0 0    Auditory Disturbances 0 0 0    Paroxysmal Sweats 0 0 0    Visual Disturbances 0 0 0    Anxiety 0 0 0    Headache, Fullness in Head 0 0 0    Agitation 0 0 0    Orientation and Clouding of Sensorium 0 0 0    CIWA-Ar Total 0 0 0      Row Name 09/20/24 1100             CIWA-Ar    Nausea and Vomiting 0      Tactile Disturbances 0      Tremor 0      Auditory Disturbances 0      Paroxysmal Sweats 0      Visual Disturbances 0      Anxiety 0      Headache, Fullness in Head 0      Agitation 1      Orientation and Clouding of Sensorium 0      CIWA-Ar Total 1                      Pertinent Labs/Diagnostic Results:   Radiology:  XR chest portable ICU   Final Interpretation by Nickolas Vasquez MD (09/20 0739)      Tubes and lines as above.            Workstation performed: [id number]           Cardiology:  ECG 12 lead   Final Result by Henry Reddy DO (09/23 0731)   Normal sinus rhythm   Moderate voltage criteria for LVH, may be normal variant ( Sokolow-Gomez ,    [place] product )   T wave abnormality, consider inferior ischemia   Abnormal ECG   When compared with ECG of 20-SEP-2024 04:34,   QRS axis Shifted right   ST no longer depressed in Inferior leads   ST elevation now present in Anterior leads   Inverted T waves have replaced nonspecific T wave abnormality in Inferior    leads   Nonspecific T wave abnormality no longer evident in Anterior leads   Confirmed by Henry Reddy ([id number]) on 9/23/2024 7:31:01 AM      MAURILIO Intraop Interventional w/ realtime guidance of cardiac procedures   Final Result by SYSTEMGENERATED, ZULMA (09/20 0706)   This order contains the linked images for the MAURILIO that was performed by    the Anesthesiologist.  Please see the  CARDIAC MAURILIO ANESTHESIA procedure    for results.      ECG 12 lead   Final Result by Ayush Carmona MD (09/20 7791)   Normal sinus  rhythm   Left anterior fascicular block   Nonspecific ST and T wave abnormality   Prolonged QT   Abnormal ECG   When compared with ECG of 19-SEP-2024 16:08, (unconfirmed)   Premature ventricular complexes are no longer Present   Vent. rate has increased BY  26 BPM   Nonspecific T wave abnormality has replaced inverted T waves in Inferior    leads   Nonspecific T wave abnormality has replaced inverted T waves in    Anterolateral leads   Confirmed by Ayush Carmona ([id number]) on 9/20/2024 5:51:38 PM        GI:  No orders to display       Results from last 7 days   Lab Units 09/19/24  1625   SARS-COV-2  Negative     Results from last 7 days   Lab Units 09/23/24  0530 09/22/24  0425 09/21/24  0435 09/20/24  1257 09/20/24  0847 09/19/24  1925 09/19/24  1607   WBC Thousand/uL 15.79* 19.75* 15.38*  --  10.38*   < > 13.89*   HEMOGLOBIN g/dL 10.0* 9.8* 9.6* 10.9* 11.0*   < > 14.4   I STAT HEMOGLOBIN   --   --   --   --   --    < >  --    HEMATOCRIT % 30.1* 29.8* 29.6* 32.7* 33.3*   < > 44.2   HEMATOCRIT, ISTAT   --   --   --   --   --    < >  --    PLATELETS Thousands/uL 201 187 161  --  182   < > 240   TOTAL NEUT ABS Thousands/µL  --   --   --   --   --   --  10.17*    < > = values in this interval not displayed.         Results from last 7 days   Lab Units 09/23/24  0530 09/22/24  0425 09/21/24  0435 09/20/24  1946 09/20/24  1257 09/20/24  0701 09/20/24  0432 09/20/24  0353 09/20/24  0259 09/20/24  0157   SODIUM mmol/L 135 134* 135 137  --  145  --   --   --   --    POTASSIUM mmol/L 3.8 4.5 4.3 3.9 4.3 3.9  --   --   --   --    CHLORIDE mmol/L 101 100 101 103  --  109*  --   --   --   --    CO2 mmol/L 24 26 27 27  --  27  --   --   --   --    CO2, I-STAT mmol/L  --   --   --   --   --   --  28 27 28 24   ANION GAP mmol/L 10 8 7 7  --  9  --   --   --   --    BUN mg/dL 30* 27* 15 14  --  20  --   --   --   --    CREATININE mg/dL 0.92 0.97 0.90 0.85  --  1.22  --   --   --   --    EGFR ml/min/1.73sq m 89 83 91 94  --  63  --    --   --   --    CALCIUM mg/dL 8.2* 8.5 7.8* 7.5*  --  8.6  --   --   --   --    CALCIUM, IONIZED mmol/L  --   --   --  1.01*  --   --   --   --   --   --    CALCIUM, IONIZED, ISTAT mmol/L  --   --   --   --   --   --  1.24 1.16 1.10* 1.13   MAGNESIUM mg/dL  --  2.1 2.2 1.9  --  3.0*  --   --   --   --    PHOSPHORUS mg/dL  --   --   --  3.4  --   --   --   --   --   --      Results from last 7 days   Lab Units 09/19/24  1607   AST U/L 18   ALT U/L 17   ALK PHOS U/L 50   TOTAL PROTEIN g/dL 7.5   ALBUMIN g/dL 4.2   TOTAL BILIRUBIN mg/dL 0.65     Results from last 7 days   Lab Units 09/23/24  1036 09/23/24  0550 09/22/24  2124 09/22/24  1613 09/22/24  1103 09/22/24  0544 09/21/24  2039 09/21/24  1553 09/21/24  1101 09/21/24  1001 09/21/24  0805 09/21/24  0606   POC GLUCOSE mg/dl 103 95 115 103 123 133 119 126 102 108 148* 127     Results from last 7 days   Lab Units 09/23/24  0530 09/22/24  0425 09/21/24  0435 09/20/24  1946 09/20/24  0701 09/19/24  1607   GLUCOSE RANDOM mg/dL 110 138 118 126 120 122         Results from last 7 days   Lab Units 09/20/24  0833   HEMOGLOBIN A1C % 6.0*   EAG mg/dl 126     Results from last 7 days   Lab Units 09/20/24  0432 09/20/24  0353 09/20/24  0259 09/20/24  0145 09/20/24  0116 09/19/24  2305 09/19/24  2218   PH, JANY I-STAT   --   --   --   --  7.398  --  7.273*   PCO2, JANY ISTAT mm HG  --   --   --   --  37.5*  --  56.5*   PO2, JANY ISTAT mm HG  --   --   --   --  36.0  --  80.0*   HCO3, JANY ISTAT mmol/L  --   --   --   --  23.1*  --  26.1   I STAT BASE EXC mmol/L -1 -2 0   < > -2   < > -1   I STAT O2 SAT % 97* 97* 100*   < > 69   < > 94*   ISTAT PH ART  7.254* 7.246* 7.304*   < >  --    < >  --    I STAT ART PCO2 mm HG 59.9* 58.7* 53.6*   < >  --    < >  --    I STAT ART PO2 mm .0 102.0 239.0*   < >  --    < >  --    I STAT ART HCO3 mmol/L 26.5 25.5 26.6   < >  --    < >  --     < > = values in this interval not displayed.         Results from last 7 days   Lab Units  09/19/24  1759 09/19/24  1607   HS TNI 0HR ng/L  --  4   HS TNI 2HR ng/L 4  --    HSTNI D2 ng/L 0  --          Results from last 7 days   Lab Units 09/21/24  0435 09/20/24  0712 09/20/24  0350   PROTIME seconds 15.4* 10.3* 11.0*   INR  1.20* 0.70* 0.76*   PTT seconds  --  31 35*     Results from last 7 days   Lab Units 09/19/24  1607   BNP pg/mL 49     Results from last 7 days   Lab Units 09/22/24  0655 09/20/24  0805 09/20/24  0555   UNIT PRODUCT CODE  [id number]  [id number]  [id number]  [id number]  B0055F87  E2979C61  W9905K97 I3759M23  H6549O05  I9109A70  M6374F26  X1700K79  K2912Q05  E9099D41  V2734P28  K0296Q34   UNIT NUMBER  [id number]  [id number]  [id number]  [id number]  [id number]  [id number]  [id number]  [id number]  [id number]  [id number]  [id number]  [id number]  [id number]  [id number]  [id number]   UNITABO  O  O  O  O O  O  O  O O  O  O  O  O  AB  AB  O  A   UNITRH  POS  POS  POS  POS POS  POS  POS  POS NEG  POS  POS  POS  POS  POS  NEG  POS  POS   CROSSMATCH  Compatible  Compatible  Compatible  Compatible Compatible  Compatible  Compatible  Compatible  --    UNIT DISPENSE STATUS  Return to Inv  Return to Inv  Return to Inv  Return to Inv Return to Inv  Return to Inv  Return to Inv  Return to Inv Presumed Trans  Presumed Trans  Presumed Trans  Presumed Trans  Presumed Trans  Presumed Trans  Presumed Trans  Presumed Trans  Presumed Trans   UNIT PRODUCT VOL mL 350  350  350  350 350  350  350  350 125  280  300  125  125  280  280  300  300     Results from last 7 days   Lab Units 09/19/24  1625   INFLUENZA A PCR  Negative   INFLUENZA B PCR  Negative   RSV PCR  Negative     Medications:   Scheduled Medications:  acetaminophen, 975 mg, Oral, Q6H While awake  amiodarone, 200 mg, Oral, Q8H HOSEA  amLODIPine, 5 mg,  Oral, Daily  aspirin, 325 mg, Oral, Daily  atorvastatin, 20 mg, Oral, Daily With Dinner  docusate sodium, 100 mg, Oral, BID  folic acid, 1 mg, Oral, Daily  guaiFENesin, 1,200 mg, Oral, Q12H HOSEA  heparin (porcine), 5,000 Units, Subcutaneous, Q8H HOSEA  insulin lispro, 1-6 Units, Subcutaneous, TID AC  insulin lispro, 1-6 Units, Subcutaneous, HS  losartan, 100 mg, Oral, Daily  metoprolol tartrate, 75 mg, Oral, Q12H HOSEA  multivitamin-minerals, 1 tablet, Oral, Daily  pantoprazole, 40 mg, Oral, Early Morning  polyethylene glycol, 17 g, Oral, Daily  potassium chloride, 10 mEq, Oral, Daily  thiamine, 100 mg, Oral, Daily  torsemide, 10 mg, Oral, Daily      Continuous IV Infusions: none     PRN Meds:  ondansetron, 4 mg, Intravenous, Q6H PRN  oxyCODONE, 2.5 mg, Oral, Q4H PRN   Or  oxyCODONE, 5 mg, Oral, Q4H PRN  temazepam, 15 mg, Oral, HS PRN        Discharge Plan: [facility]    Network Utilization Review Department  ATTENTION: Please call with any questions or concerns to [phone] and carefully listen to the prompts so that you are directed to the right person. All voicemails are confidential.   For Discharge needs, contact Care Management DC Support Team at [phone] opt. [phone]  Send all requests for admission clinical reviews, approved or denied determinations and any other requests to dedicated fax number below belonging to the campus where the patient is receiving treatment. List of dedicated fax numbers for the Facilities:  FACILITY NAME UR FAX NUMBER   ADMISSION DENIALS (Administrative/Medical Necessity) [phone]   DISCHARGE SUPPORT TEAM (NETWORK) [phone]   PARENT CHILD HEALTH (Maternity/NICU/Pediatrics) [phone]   [facility] [phone]   [facility] [phone]   [facility] [phone]   [facility] [phone]   [facility] [phone]   [facility]  [phone]   [facility] [phone]   [facility] [phone]   [facility] [phone]   [facility] [phone]   [facility] [phone]   [facility] [phone]

## 2024-09-23 NOTE — ASSESSMENT & PLAN NOTE
-Mild to moderate mitral regurgitation  -will check transthoracic echo which can serve as baseline for following serially

## 2024-09-23 NOTE — PROGRESS NOTES
Progress Note - Cardiac Surgery   Santos Sandoval 59 y.o. male MRN: [id number]  Unit/Bed#: [facility]315-01 Encounter: [id number]    Type A aortic dissection s/p ascending aorta replacement, re-suspension of the aortic valve, and frozen elephant trunk full arch replacement with a [place] TAG 34 x 34 x 150 endovascular graft; POD # 4      24 Hour Events: SR, on RA. No events overnight. Hypertensive overnight and this AM. /99. Passing gas, no BM yet. Passing urine. Eating/drinking well.       Medications:   Scheduled Meds:  Current Facility-Administered Medications   Medication Dose Route Frequency Provider Last Rate    acetaminophen  975 mg Oral Q6H While awake Sukumar Hillman PA-C      amiodarone  200 mg Oral Q8H HOSEA Sukumar Hillman PA-C      amLODIPine  5 mg Oral Daily Luis Angel Vaughn PA-C      aspirin  325 mg Oral Daily Sukumar Hillman PA-C      atorvastatin  20 mg Oral Daily With Dinner Keily Payton PA-C      docusate sodium  100 mg Oral BID Sukumar Hillman PA-C      folic acid  1 mg Oral Daily Sukumar Hillman PA-C      heparin (porcine)  5,000 Units Subcutaneous Q8H HOSEA Sukumar Hillman PA-C      insulin lispro  1-6 Units Subcutaneous TID AC Sukumar Hillman PA-C      insulin lispro  1-6 Units Subcutaneous HS Sukumar Hillman PA-C      losartan  100 mg Oral Daily Luis Angel Vaughn PA-C      metoprolol tartrate  75 mg Oral Q12H HOSEA Luis Angel Vaughn PA-C      multivitamin-minerals  1 tablet Oral Daily Sukumar Hillman PA-C      ondansetron  4 mg Intravenous Q6H PRN Sukumar Hillman PA-C      oxyCODONE  2.5 mg Oral Q4H PRN Sukumar Hillman PA-C      Or    oxyCODONE  5 mg Oral Q4H PRN Sukumar Hillman PA-C      pantoprazole  40 mg Oral Early Morning Sukumar Hillman PA-C      polyethylene glycol  17 g Oral Daily Sukumar Hillman PA-C      potassium chloride  10 mEq Oral Daily Keily Payton PA-C      temazepam  15 mg Oral HS PRN Sukumar Hillman PA-C      thiamine  100 mg Oral Daily Sukumar Hillman PA-C      torsemide  10 mg  Oral Daily Keily Payton PA-C       Continuous Infusions:   PRN Meds:.  ondansetron    oxyCODONE **OR** oxyCODONE    temazepam    Vitals:   Vitals:    09/23/24 0229 09/23/24 0600 09/23/24 0731 09/23/24 0800   BP: 168/95  (!) 180/99    Pulse: 73  78 78   Resp:   16    Temp: 97.7 °F (36.5 °C)      TempSrc:       SpO2: 93%  91%    Weight:  78.9 kg (173 lb 15.1 oz)         Telemetry: NSR 70s    Respiratory:   SpO2: SpO2: 91 %, RA  Intake/Output:     Intake/Output Summary (Last 24 hours) at 9/23/2024 0835  Last data filed at 9/23/2024 0745  Gross per 24 hour   Intake 829 ml   Output 1350 ml   Net -521 ml       Weights:   Weight (last 2 days)       Date/Time Weight    09/23/24 0600 78.9 (173.94)    09/22/24 0533 81.8 (180.34)    09/21/24 0600 85.4 (188.27)          Admission weight: No preop weight obtained due to emergency  Per pt baseline weight apprx 175lbs    Chest tubes have been removed      Results:   Results from last 7 days   Lab Units 09/23/24  0530 09/22/24 0425 09/21/24  0435   WBC Thousand/uL 15.79* 19.75* 15.38*   HEMOGLOBIN g/dL 10.0* 9.8* 9.6*   HEMATOCRIT % 30.1* 29.8* 29.6*   PLATELETS Thousands/uL 201 187 161     Results from last 7 days   Lab Units 09/23/24  0530 09/22/24  0425 09/21/24  0435 09/20/24  0701 09/20/24  0432   SODIUM mmol/L 135 134* 135   < >  --    POTASSIUM mmol/L 3.8 4.5 4.3   < >  --    CHLORIDE mmol/L 101 100 101   < >  --    CO2 mmol/L 24 26 27   < >  --    CO2, I-STAT mmol/L  --   --   --   --  28   BUN mg/dL 30* 27* 15   < >  --    CREATININE mg/dL 0.92 0.97 0.90   < >  --    GLUCOSE, ISTAT mg/dl  --   --   --   --  171*   CALCIUM mg/dL 8.2* 8.5 7.8*   < >  --     < > = values in this interval not displayed.     Recent Labs     09/22/24  0425   MG 2.1     Results from last 7 days   Lab Units 09/21/24  0435 09/20/24  0712 09/20/24  0350   INR  1.20* 0.70* 0.76*   PTT seconds  --  31 35*     Point of care glucose:     Studies:  No new studies    No pertinent imaging studies  reviewed.    Invasive Lines/Tubes:  Invasive Devices       Central Venous Catheter Line  Duration             CVC Central Lines 09/19/24 3 days              Peripheral Intravenous Line  Duration             Peripheral IV 09/19/24 Left Antecubital 3 days    Peripheral IV 09/19/24 Right Antecubital 3 days    Peripheral IV 09/19/24 Right Arm 3 days                    Physical Exam:    General: No acute distress, Alert, and Normal appearance  HEENT/NECK:  Normocephalic. Atraumatic.  NO jugular venous distention.    Cardiac: Regular rate and rhythm and No murmurs/rubs/gallops  Pulmonary:   + rhonchi bilaterally, good air movement bilaterally, decreased in bases  Abdomen:  Non-tender, Non-distended, and Normal bowel sounds  Incisions: Sternum is stable.  Incision is clean, dry, and intact.   Extremities: Extremities warm/dry and No edema B/L  Neuro: Alert and oriented X 3 and No focal deficits  Skin: Warm/Dry, without rashes or lesions.       Assessment:  Principal Problem:    Type 1 dissection of ascending aorta (HCC)  Active Problems:    Essential hypertension    Smoking    Hx of colonoscopy with polypectomy    S/P aortic dissection repair    Acute blood loss as cause of postoperative anemia    Leukocytosis    Anemia    Hyponatremia    Bradycardia    Mitral regurgitation       Type A aortic dissection s/p ascending aorta replacement, re-suspension of the aortic valve, and frozen elephant trunk full arch replacement with a [place] TAG 34 x 34 x 150 endovascular graft; POD # 4    Plan:    Cardiac:     Emergent surgical admission for aortic dissection  Normal ventricular systolic function, EF 50%    NSR; HR well-controlled, hypertensive /99 this AM    Increase to Lopressor, 75mg PO BID    ACE inhibitor/ARB indicated; Start Losartan, 100mg PO Daily  (Home dose equivalent of valsartan 160mg)    Add Norvasc 5mg daily    Continue prophylactic Amiodarone, 200 mg PO TID    Continue ASA and Statin therapy    Epicardial pacing  wires no longer required.  Remove today    Maintain central IV access today for medications requiring central IV access    Continue Arixtra for DVT prophylaxis    Pulmonary:     Acute post-op pulmonary insufficiency; Requiring 3 liters via nasal cannula, secondary to history of tobacco use, atelectasis, pulmonary vascular congestion, and poor inspiratory effort secondary to pain. Continue incentive spirometry/coughing/deep breathing exercises.  Wean supplemental oxygen as tolerated for saturation > 90%    Chest tube drainage diminished; D/C today    Renal:     Normal preoperative renal function    Intake/Output net: (-)1580 mL/24 hours    Diuretic Regimen:  Continue PO Torsemide, 10 mg QD  Continue Potassium Chloride 10 mEq PO QD    Neuro:    Neurologically intact; No active issues     Incisional pain well controlled   Continue tylenol, 975 mg PO q 8, standing dose   Continue oxycodone, 2.5 to 5 mg PO q 4 hours prn pain     Continue thiamine/folic acid/multivitamin for ETOH use PTA, Ativan x1/24hrs    GI:    Cardiac diet, with 1800 mL fluid restriction    Tolerating diet without complaint    Continue stool softeners and prn suppository    Continue GI prophylaxis    Endo:     Pre-Op Hgb A1C: 6.0    Patient has been transitioned from continuous insulin infusion to intermittent subcutaneous dosing  Serum glucose well controlled on insulin sliding scale coverage    7    Hematology:     Post-operative acute blood loss anemia; Hemoglobin 10.0; stable    Post op leukocytosis; Afebrile with no signs of infection; reactive from surgery, improving     Post op hyponatremia, improved    8.   Disposition:        Not yet medically cleared for discharge, pending BP control and BM, likely 24 hours    VTE Pharmacologic Prophylaxis: Fondaparinux (Arixtra)  VTE Mechanical Prophylaxis: sequential compression device    Collaborative rounds completed with supervising physician  Plan of care discussed with bedside nurse    SIGNATURE:  Luis Angel Vaughn PA-C  DATE: September 23, 2024  TIME: 8:35 AM

## 2024-09-23 NOTE — PROGRESS NOTES
Cardiology Progress Note - Santos Sandoval 59 y.o. male MRN: [id number]    Unit/Bed#: [facility]315-01 Encounter: [id number]        Assessment & Plan  Type 1 dissection of ascending aorta (HCC)  -Type A dissection involving the entire aorta and extending to the level of the distal common iliac artery and distal left external iliac artery.  Proximally extends from the level of the sinoatrial junction however likely involves aortic root.  Complex configuration of the distal flap with several entry and reentry tears.   - s/p urgent repair 9/19  -Continue routine postop care  -Continued on beta-blocker  S/P aortic dissection repair  -Postop day #4 ascending aorta/hemiarch, resuspension of aortic valve and frozen elephant trunk  -On amiodarone 200 mg 3 times daily/aspirin 325 mg daily/atorvastatin 20 mg daily  -On lisinopril 5 mg daily/metoprolol tartrate 50 mg twice daily  -Demadex 10 mg daily for postop volume management  -Chest tube remains, possible discontinuation today.  -EPW reman, possible discontinuation today    -Broderick discontined  -encourage ambulation and incentive spirometry  Essential hypertension  -Reports longstanding history of hypertension for which he was noncompliant with medical management  -Currently on lisinopril and metoprolol as mentioned  -BP elevated today, CT surgery adding Norvasc  Smoking  -tobacco abuse up to hospitalization  Acute blood loss as cause of postoperative anemia  -H&H stable, follow closely post-op   Bradycardia  -Presented sinus bradycardia  -resolved. HR 60-70's, tolerating BB  Mitral regurgitation  -Mild to moderate mitral regurgitation  -will check transthoracic echo which can serve as baseline for following serially    Subjective:   Patient seen and examined.  No significant events overnight.  ; pertinent negatives - chest pain, dyspnea, lower extremity edema, and palpitations.    Objective:     Vitals: Blood pressure 127/71, pulse 65, temperature 97.7 °F (36.5 °C), resp. rate  16, weight 78.9 kg (173 lb 15.1 oz), SpO2 92%., Body mass index is 24.61 kg/m².,   Orthostatic Blood Pressures      Flowsheet Row Most Recent Value   Blood Pressure 127/71 filed at 09/23/2024 0926   Patient Position - Orthostatic VS Lying filed at 09/21/2024 0400              Intake/Output Summary (Last 24 hours) at 9/23/2024 0937  Last data filed at 9/23/2024 0745  Gross per 24 hour   Intake 709 ml   Output 1350 ml   Net -641 ml       TELE: No significant arrhythmias seen.    Physical Exam:    GEN: Santos Sandoval appears well, alert and oriented x 3, pleasant and cooperative   HEENT: pupils equal, round, and reactive to light; extraocular muscles intact  NECK: supple, no carotid bruits   HEART: regular rhythm, normal S1 and S2, no murmurs, clicks, gallops or rubs   LUNGS: clear to auscultation bilaterally; no wheezes, rales, or rhonchi   ABDOMEN: normal bowel sounds, soft, no tenderness, no distention  EXTREMITIES: peripheral pulses normal; no clubbing, cyanosis, or edema  NEURO: no focal findings   SKIN: normal without suspicious lesions on exposed skin    Medications:      Current Facility-Administered Medications:     acetaminophen (TYLENOL) tablet 975 mg, 975 mg, Oral, Q6H While awake, Sukumar Hillman PA-C, 975 mg at 09/23/24 0801    amiodarone tablet 200 mg, 200 mg, Oral, Q8H HOSEA, Sukumar Hillman PA-C, 200 mg at 09/23/24 0534    amLODIPine (NORVASC) tablet 5 mg, 5 mg, Oral, Daily, Luis Angel Vaughn PA-C, 5 mg at 09/23/24 0927    aspirin tablet 325 mg, 325 mg, Oral, Daily, Sukumar Hillman PA-C, 325 mg at 09/23/24 0801    atorvastatin (LIPITOR) tablet 20 mg, 20 mg, Oral, Daily With Dinner, Keily Patyon PA-C, 20 mg at 09/22/24 1701    docusate sodium (COLACE) capsule 100 mg, 100 mg, Oral, BID, Sukumar Hillman PA-C, 100 mg at 09/23/24 0801    folic acid (FOLVITE) tablet 1 mg, 1 mg, Oral, Daily, Sukumar Hillman PA-C, 1 mg at 09/23/24 0801    guaiFENesin (MUCINEX) 12 hr tablet 1,200 mg, 1,200 mg, Oral, Q12H HOSEA,  Luis Angel Vaughn PA-C, 1,200 mg at 09/23/24 0927    heparin (porcine) subcutaneous injection 5,000 Units, 5,000 Units, Subcutaneous, Q8H HOSEA, Sukumar Hillman PA-C, 5,000 Units at 09/23/24 0533    insulin lispro (HumALOG/ADMELOG) 100 units/mL subcutaneous injection 1-6 Units, 1-6 Units, Subcutaneous, TID AC **AND** Fingerstick Glucose (POCT), , , TID AC, Sukumar Hillman PA-C    insulin lispro (HumALOG/ADMELOG) 100 units/mL subcutaneous injection 1-6 Units, 1-6 Units, Subcutaneous, HS, Sukumar Hillman PA-C    losartan (COZAAR) tablet 100 mg, 100 mg, Oral, Daily, Luis Angel Vaughn PA-C, 100 mg at 09/23/24 0927    metoprolol tartrate (LOPRESSOR) tablet 75 mg, 75 mg, Oral, Q12H HOSEA, Luis Angel Vaughn PA-C    multivitamin-minerals (CENTRUM) tablet 1 tablet, 1 tablet, Oral, Daily, Sukumar Hillman PA-C, 1 tablet at 09/23/24 0801    ondansetron (ZOFRAN) injection 4 mg, 4 mg, Intravenous, Q6H PRN, Sukumar Hillman PA-C    oxyCODONE (ROXICODONE) split tablet 2.5 mg, 2.5 mg, Oral, Q4H PRN, 2.5 mg at 09/21/24 2027 **OR** oxyCODONE (ROXICODONE) IR tablet 5 mg, 5 mg, Oral, Q4H PRN, Sukumar Hillman PA-C, 5 mg at 09/23/24 0233    pantoprazole (PROTONIX) EC tablet 40 mg, 40 mg, Oral, Early Morning, Sukumar Hillman PA-C, 40 mg at 09/23/24 0533    polyethylene glycol (MIRALAX) packet 17 g, 17 g, Oral, Daily, Sukumar Hillman PA-C, 17 g at 09/23/24 0801    potassium chloride (Klor-Con M10) CR tablet 10 mEq, 10 mEq, Oral, Daily, Keily Payton PA-C, 10 mEq at 09/23/24 0801    temazepam (RESTORIL) capsule 15 mg, 15 mg, Oral, HS PRN, Sukumar Hillman PA-C, 15 mg at 09/22/24 2154    thiamine tablet 100 mg, 100 mg, Oral, Daily, Sukumar Hillman PA-C, 100 mg at 09/23/24 0801    torsemide (DEMADEX) tablet 10 mg, 10 mg, Oral, Daily, Keily Payton PA-C, 10 mg at 09/23/24 0801     Labs & Results:        Results from last 7 days   Lab Units 09/23/24  0530 09/22/24  0425 09/21/24  0435   WBC Thousand/uL 15.79* 19.75* 15.38*   HEMOGLOBIN g/dL 10.0* 9.8*  9.6*   HEMATOCRIT % 30.1* 29.8* 29.6*   PLATELETS Thousands/uL 201 187 161     Results from last 7 days   Lab Units 09/20/24  0701   TRIGLYCERIDES mg/dL 51   HDL mg/dL 39*     Results from last 7 days   Lab Units 09/23/24  0530 09/22/24 0425 09/21/24  0435 09/20/24  0701 09/20/24  0432 09/20/24  0353 09/20/24  0259 09/19/24  1925 09/19/24  1607   POTASSIUM mmol/L 3.8 4.5 4.3   < >  --   --   --   --  4.0   CHLORIDE mmol/L 101 100 101   < >  --   --   --   --  101   CO2 mmol/L 24 26 27   < >  --   --   --   --  27   CO2, I-STAT mmol/L  --   --   --   --  28 27 28   < >  --    BUN mg/dL 30* 27* 15   < >  --   --   --   --  25   CREATININE mg/dL 0.92 0.97 0.90   < >  --   --   --   --  1.33*   CALCIUM mg/dL 8.2* 8.5 7.8*   < >  --   --   --   --  9.4   ALK PHOS U/L  --   --   --   --   --   --   --   --  50   ALT U/L  --   --   --   --   --   --   --   --  17   AST U/L  --   --   --   --   --   --   --   --  18   GLUCOSE, ISTAT mg/dl  --   --   --   --  171* 211* 254*   < >  --     < > = values in this interval not displayed.     Results from last 7 days   Lab Units 09/21/24 0435 09/20/24  0712 09/20/24  0350   INR  1.20* 0.70* 0.76*   PTT seconds  --  31 35*     Results from last 7 days   Lab Units 09/22/24 0425 09/21/24 0435 09/20/24  1946   MAGNESIUM mg/dL 2.1 2.2 1.9       IntraOp MAURILIO: personally reviewed -low normal ejection fraction, mild to moderate mitral regurgitation    EKG personally reviewed by Pavan Olmos MD.

## 2024-09-23 NOTE — CASE MANAGEMENT
Case Management Assessment & Discharge Planning Note    Patient name Santos Sandoval  [facility] MRN [id number]  : 1965 Date 2024       Current Admission Date: 2024  Current Admission Diagnosis:Type 1 dissection of ascending aorta (HCC)   Patient Active Problem List    Diagnosis Date Noted Date Diagnosed    Leukocytosis 2024     Anemia 2024     Hyponatremia 2024     Bradycardia 2024     Mitral regurgitation 2024     Acute blood loss as cause of postoperative anemia 2024     Type 1 dissection of ascending aorta (HCC) 2024     S/P aortic dissection repair 2024     Neck muscle spasm 2023     Gross hematuria 06/15/2023     Essential hypertension 10/18/2021     Smoking 10/18/2021     Hx of colonoscopy with polypectomy 10/18/2021       LOS (days): 4  Geometric Mean LOS (GMLOS) (days):   Days to GMLOS:     OBJECTIVE:    Risk of Unplanned Readmission Score: 21.05         Current admission status: Inpatient       Preferred Pharmacy:   [id number]  Phone: [phone] Fax: [phone]    Primary Care Provider: Sheldon Moreira MD    Primary Insurance: [facility]  Secondary Insurance:     ASSESSMENT:  Active Health Care Proxies    There are no active Health Care Proxies on file.       Advance Directives  Does patient have a Health Care POA?: No  Was patient offered paperwork?: Yes (declined)  Does patient currently have a Health Care decision maker?: No  Does patient have Advance Directives?: No  Was patient offered paperwork?: Yes (declined)  Primary Contact: sister Claribel Sandoval         Readmission Root Cause  30 Day Readmission: No    Patient Information  Admitted from:: Home  Mental Status: Alert  During Assessment patient was accompanied by: Not accompanied during assessment  Assessment information provided by:: Patient  Primary  Caregiver: Self  Support Systems: Family members  County of Residence: [place]  What city do you live in?: [place]  Home entry access options. Select all that apply.: Stairs  Number of steps to enter home.: 2  Do the steps have railings?: Yes  Type of Current Residence: 2 story home  Upon entering residence, is there a bedroom on the main floor (no further steps)?: No  A bedroom is located on the following floor levels of residence (select all that apply):: 2nd Floor  Upon entering residence, is there a bathroom on the main floor (no further steps)?: No  Indicate which floors of current residence have a bathroom (select all the apply):: Basement, 2nd Floor  Number of steps to basement from main floor: One Flight  Number of steps to 2nd floor from main floor: One Flight  Living Arrangements: Lives Alone  Is patient a ?: No    Activities of Daily Living Prior to Admission  Functional Status: Independent  Completes ADLs independently?: Yes  Ambulates independently?: Yes  Does patient use assisted devices?: No  Does patient currently own DME?: No  Does patient have a history of Outpatient Therapy (PT/OT)?: No  Does the patient have a history of Short-Term Rehab?: No  Does patient have a history of HHC?: No  Does patient currently have HHC?: No         Patient Information Continued  Income Source: Employed  Does patient have prescription coverage?: Yes  Does patient receive dialysis treatments?: No  Does patient have a history of substance abuse?: Yes, Currently using  Current substance use preference: Marijuana, Alcohol/ETOH (smokes daily, drinks 3x a week)  Historical substance use preference: Marijuana, Alcohol/ETOH  Is patient currently in treatment for substance abuse?: No. Patient declined treatment information.  Does patient have a history of Mental Health Diagnosis?: No         Means of Transportation  Means of Transport to Appts:: Drives Self      Social Determinants of Health (SDOH)       Flowsheet Row Most Recent Value   Housing Stability    In the last 12 months, was there a time when you were not able to pay the mortgage or rent on time? N   In the past 12 months, how many times have you moved where you were living? 0   At any time in the past 12 months, were you homeless or living in a shelter (including now)? N   Transportation Needs    In the past 12 months, has lack of transportation kept you from medical appointments or from getting medications? no   In the past 12 months, has lack of transportation kept you from meetings, work, or from getting things needed for daily living? No   Food Insecurity    Within the past 12 months, you worried that your food would run out before you got the money to buy more. Never true   Within the past 12 months, the food you bought just didn't last and you didn't have money to get more. Never true   Utilities    In the past 12 months has the electric, gas, oil, or water company threatened to shut off services in your home? No            DISCHARGE DETAILS:    Discharge planning discussed with:: Patient--agreeable to [facility] post-op, [facility] if able  Freedom of Choice: Yes     CM contacted family/caregiver?: No- see comments (independent)  Were Treatment Team discharge recommendations reviewed with patient/caregiver?: Yes  Did patient/caregiver verbalize understanding of patient care needs?: Yes  Were patient/caregiver advised of the risks associated with not following Treatment Team discharge recommendations?: Yes    Contacts  Patient Contacts: sister Claribel Sandoval  Relationship to Patient:: Family  Contact Method: Phone  Phone Number: [phone]  Reason/Outcome: Continuity of Care, Emergency Contact, Discharge Planning    Requested Home Health Care         Is the patient interested in HHC at discharge?: Yes  Home Health Discipline requested:: Nursing  Home Health Follow-Up Provider:: PCP  Home Health Services Needed:: Post-Op Care and Assessment  Homebound  Criteria Met:: Requires the Assistance of Another Person for Safe Ambulation or to Leave the Home  Supporting Clincal Findings:: Limited Endurance, Fatigues Easliy in Short Distances    DME Referral Provided  Referral made for DME?: No    Other Referral/Resources/Interventions Provided:  Interventions: [facility]  Referral Comments: referral to [facility] as requested    Would you like to participate in our [facility] service program?  : No - Declined    Treatment Team Recommendation: Home with Home Health Care  Discharge Destination Plan:: Home with Home Health Care  Transport at Discharge : Family                                      Additional Comments: 58yo male admitted with Type 1 dissection of ascending aorta, now POD#4. Hx ETOH, THC daily, HTN. Lives alone, agreeable to [facility] nursing postop. Sister will transport at discharge. Possible home tomorrow if BP control improved.

## 2024-09-23 NOTE — ASSESSMENT & PLAN NOTE
-Type A dissection involving the entire aorta and extending to the level of the distal common iliac artery and distal left external iliac artery.  Proximally extends from the level of the sinoatrial junction however likely involves aortic root.  Complex configuration of the distal flap with several entry and reentry tears.   - s/p urgent repair 9/19  -Continue routine postop care  -Continued on beta-blocker

## 2024-09-23 NOTE — PHYSICAL THERAPY NOTE
PHYSICAL THERAPY NOTE          Patient Name: Santos Sandoval  Today's Date: 9/23/2024 09/23/24 1425   PT Last Visit   PT Visit Date 09/23/24   Note Type   Note Type Treatment   Pain Assessment   Pain Assessment Tool 0-10   Pain Score No Pain   Restrictions/Precautions   Other Precautions Cardiac/sternal;Telemetry   General   Chart Reviewed Yes   Additional Pertinent History cleared for Tx session by nsg   Response to Previous Treatment Patient with no complaints from previous session.   Cognition   Overall Cognitive Status WFL   Arousal/Participation Cooperative   Attention Attends with cues to redirect   Orientation Level Oriented to person;Oriented to place;Oriented to situation   Memory Decreased recall of precautions   Following Commands Follows one step commands without difficulty   Subjective   Subjective Pt is resting in bed; arousable; agreeable to amb (incl w/o AD) and try steps;   Bed Mobility   Supine to Sit 6  Modified independent   Transfers   Sit to Stand 6  Modified independent   Stand to Sit 6  Modified independent   Ambulation/Elevation   Gait pattern Inconsistent eliecer;Wide SAMANTHA;Excessively slow  (no overt LOB, knee bucklng or excessive swaying)   Gait Assistance 6  Modified independent   Assistive Device None   Distance 150 ft + 2 x 10 ft + 140 ft + 160 ft w/ seated rest periods and steps negotiation in between   Stair Management Assistance 6  Modified independent   Additional items Verbal cues  (discussed sequencing and UE precaution on the hand rail)   Stair Management Technique One rail R;Alternating pattern;Step to pattern;Reciprocal;Nonreciprocal  (Pt did not seem to follow recommendations for nonreciprocal pattern and proceeded w/ mostly reciprocal sequencing)   Number of Stairs 7   Balance   Static Sitting Good   Static Standing Fair +   Ambulatory Fair   Activity Tolerance   Activity Tolerance Patient  tolerated treatment well   Nurse Made Aware spoke to DOC Gonzalez   Exercises   Knee AROM Long Arc Quad Sitting;15 reps;AROM;Bilateral   Ankle Pumps Sitting;15 reps;AROM;Bilateral   Marching Sitting;10 reps;AROM;Bilateral   Assessment   Assessment Pt demonstrated overall significant improvement in balance, endurance and all aspects of observed mobility progressing to mod (I) level on level surfaces (incl amb w/o AD) and on the steps; no overt uncorrected LOB, gross knee buckling, or swaying observed during the session; no increased discomfort or excessive fatigue expressed; HEP reviewed; pt appeared to be comfortable at the end of session; overall, cont to anticipate pt will return home upon D/C from PT/mobility stand point and when medically cleared; no other immediate PT needs otherwise identified while in the hospital; pt informed and concurred; pt expressed no concerns about going home from mobility stand point, when medically cleared; will therefore sign off   Goals   Patient Goals to return home   PT Treatment Day 1   Plan   Treatment/Interventions Spoke to nursing;Spoke to case management   Progress Discontinue PT   PT Frequency Other (Comment)  (D/C PT)   Discharge Recommendation   Rehab Resource Intensity Level, PT III (Minimum Resource Intensity)   Equipment Recommended Walker   Walker Package Recommended Wheeled walker   Change/add to Walker Package? No   AM-PAC Basic Mobility Inpatient   Turning in Flat Bed Without Bedrails 4   Lying on Back to Sitting on Edge of Flat Bed Without Bedrails 4   Moving Bed to Chair 4   Standing Up From Chair Using Arms 4   Walk in Room 4   Climb 3-5 Stairs With Railing 4   Basic Mobility Inpatient Raw Score 24   Basic Mobility Standardized Score 57.68   [facility] Highest Level Of Mobility   JH-HLM Goal 8: Walk 250 feet or more   -HLM Achieved 8: Walk 250 feet ot more   Education   Education Provided Mobility training;Home exercise program   Patient Demonstrates verbal  understanding   End of Consult   Patient Position at End of Consult Seated edge of bed;All needs within reach       Miguelangel Pool

## 2024-09-23 NOTE — CASE MANAGEMENT
Case Management Discharge Planning Note    Patient name Santos Sandoval  Location [facility] MRN [id number]  : 1965 Date 2024       Current Admission Date: 2024  Current Admission Diagnosis:Type 1 dissection of ascending aorta (HCC)   Patient Active Problem List    Diagnosis Date Noted Date Diagnosed    Leukocytosis 2024     Anemia 2024     Hyponatremia 2024     Bradycardia 2024     Mitral regurgitation 2024     Acute blood loss as cause of postoperative anemia 2024     Type 1 dissection of ascending aorta (HCC) 2024     S/P aortic dissection repair 2024     Neck muscle spasm 2023     Gross hematuria 06/15/2023     Essential hypertension 10/18/2021     Smoking 10/18/2021     Hx of colonoscopy with polypectomy 10/18/2021       LOS (days): 4  Geometric Mean LOS (GMLOS) (days):   Days to GMLOS:     OBJECTIVE:  Risk of Unplanned Readmission Score: 21.1         Current admission status: Inpatient   Preferred Pharmacy:   [facility] [address] [place], [place] [address]  Phone: [phone] Fax: [phone]    Primary Care Provider: Sheldon Moreira MD    Primary Insurance: [facility]  Secondary Insurance:     DISCHARGE DETAILS:    Discharge planning discussed with:: Patient--agreeable to [facility] nsg post-op, [facility] if able  Freedom of Choice: Yes     CM contacted family/caregiver?: No- see comments (independent)  Were Treatment Team discharge recommendations reviewed with patient/caregiver?: Yes  Did patient/caregiver verbalize understanding of patient care needs?: Yes  Were patient/caregiver advised of the risks associated with not following Treatment Team discharge recommendations?: Yes    Contacts  Patient Contacts: sister Claribel Sadnoval  Relationship to Patient:: Family  Contact Method: Phone  Phone Number: [phone]  Reason/Outcome: Continuity of Care, Emergency Contact, Discharge  Planning    Requested Home Health Care         Is the patient interested in HHC at discharge?: Yes  Home Health Discipline requested:: Nursing  [facility]  Home Health Follow-Up Provider:: BRENDA  Home Health Services Needed:: Post-Op Care and Assessment  Homebound Criteria Met:: Requires the Assistance of Another Person for Safe Ambulation or to Leave the Home  Supporting Clincal Findings:: Limited Endurance, Fatigues Easliy in Short Distances    DME Referral Provided  Referral made for DME?: No    Other Referral/Resources/Interventions Provided:  Interventions: HHC  Referral Comments: referral to JESSICA as requested    Would you like to participate in our [facility] service program?  : No - Declined    Treatment Team Recommendation: Home with Home Health Care  Discharge Destination Plan:: Home with Home Health Care  Transport at Discharge : Family                                      Additional Comments: Accepted by JESSICA ojeda

## 2024-09-23 NOTE — PLAN OF CARE
Problem: PHYSICAL THERAPY ADULT  Goal: Performs mobility at highest level of function for planned discharge setting.  See evaluation for individualized goals.  Description: Treatment/Interventions: Functional transfer training, LE strengthening/ROM, Elevations, Therapeutic exercise, Endurance training, Patient/family training, Equipment eval/education, Bed mobility, Gait training, Compensatory technique education, Spoke to nursing, OT  Equipment Recommended: Walker       See flowsheet documentation for full assessment, interventions and recommendations.  Outcome: Adequate for Discharge  Note: Prognosis: Excellent  Problem List: Decreased endurance, Impaired balance, Decreased mobility  Assessment: Pt demonstrated overall significant improvement in balance, endurance and all aspects of observed mobility progressing to mod (I) level on level surfaces (incl amb w/o AD) and on the steps; no overt uncorrected LOB, gross knee buckling, or swaying observed during the session; no increased discomfort or excessive fatigue expressed; HEP reviewed; pt appeared to be comfortable at the end of session; overall, cont to anticipate pt will return home upon D/C from PT/mobility stand point and when medically cleared; no other immediate PT needs otherwise identified while in the hospital; pt informed and concurred; pt expressed no concerns about going home from mobility stand point, when medically cleared; will therefore sign off  Barriers to Discharge: Decreased caregiver support, Inaccessible home environment     Rehab Resource Intensity Level, PT: III (Minimum Resource Intensity)    See flowsheet documentation for full assessment.

## 2024-09-24 ENCOUNTER — APPOINTMENT (INPATIENT)
Dept: NON INVASIVE DIAGNOSTICS | Facility: HOSPITAL | Age: 59
DRG: 219 | End: 2024-09-24
Payer: COMMERCIAL

## 2024-09-24 ENCOUNTER — HOME HEALTH ADMISSION (OUTPATIENT)
Dept: HOME HEALTH SERVICES | Facility: HOME HEALTHCARE | Age: 59
End: 2024-09-24
Payer: COMMERCIAL

## 2024-09-24 VITALS
DIASTOLIC BLOOD PRESSURE: 65 MMHG | HEIGHT: 70 IN | SYSTOLIC BLOOD PRESSURE: 117 MMHG | OXYGEN SATURATION: 93 % | BODY MASS INDEX: 24.48 KG/M2 | HEART RATE: 66 BPM | WEIGHT: 171 LBS | TEMPERATURE: 98.2 F | RESPIRATION RATE: 18 BRPM

## 2024-09-24 LAB
ANION GAP SERPL CALCULATED.3IONS-SCNC: 10 MMOL/L (ref 4–13)
AORTIC ROOT: 4.1 CM
ASCENDING AORTA: 3.5 CM
BSA FOR ECHO PROCEDURE: 1.95 M2
BUN SERPL-MCNC: 20 MG/DL (ref 5–25)
CALCIUM SERPL-MCNC: 8.4 MG/DL (ref 8.4–10.2)
CHLORIDE SERPL-SCNC: 100 MMOL/L (ref 96–108)
CO2 SERPL-SCNC: 24 MMOL/L (ref 21–32)
CREAT SERPL-MCNC: 0.81 MG/DL (ref 0.6–1.3)
DOP CALC LVOT AREA: 3.8
DOP CALC LVOT DIAMETER: 2.2 CM
E WAVE DECELERATION TIME: 254 MS
E/A RATIO: 1.17
ERYTHROCYTE [DISTWIDTH] IN BLOOD BY AUTOMATED COUNT: 13.2 % (ref 11.6–15.1)
FRACTIONAL SHORTENING: 20 (ref 28–44)
GFR SERPL CREATININE-BSD FRML MDRD: 95 ML/MIN/1.73SQ M
GLUCOSE SERPL-MCNC: 108 MG/DL (ref 65–140)
GLUCOSE SERPL-MCNC: 114 MG/DL (ref 65–140)
GLUCOSE SERPL-MCNC: 93 MG/DL (ref 65–140)
HCT VFR BLD AUTO: 30.3 % (ref 36.5–49.3)
HGB BLD-MCNC: 10.1 G/DL (ref 12–17)
INTERVENTRICULAR SEPTUM IN DIASTOLE (PARASTERNAL SHORT AXIS VIEW): 0.9 CM
INTERVENTRICULAR SEPTUM: 0.9 CM (ref 0.6–1.1)
LA/AORTA RATIO 2D: 1.07
LAAS-AP2: 19.6 CM2
LAAS-AP4: 20.7 CM2
LEFT ATRIUM SIZE: 4.4 CM
LEFT ATRIUM VOLUME (MOD BIPLANE): 50 ML
LEFT ATRIUM VOLUME INDEX (MOD BIPLANE): 25.5 ML/M2
LEFT INTERNAL DIMENSION IN SYSTOLE: 3.7 CM (ref 2.1–4)
LEFT VENTRICULAR INTERNAL DIMENSION IN DIASTOLE: 4.6 CM (ref 3.5–6)
LEFT VENTRICULAR POSTERIOR WALL IN END DIASTOLE: 1.2 CM
LEFT VENTRICULAR STROKE VOLUME: 37 ML
LVSV (TEICH): 37 ML
MCH RBC QN AUTO: 29.4 PG (ref 26.8–34.3)
MCHC RBC AUTO-ENTMCNC: 33.3 G/DL (ref 31.4–37.4)
MCV RBC AUTO: 88 FL (ref 82–98)
MV E'TISSUE VEL-LAT: 14 CM/S
MV E'TISSUE VEL-SEP: 10 CM/S
MV PEAK A VEL: 0.7 M/S
MV PEAK E VEL: 82 CM/S
PLATELET # BLD AUTO: 221 THOUSANDS/UL (ref 149–390)
PMV BLD AUTO: 9.1 FL (ref 8.9–12.7)
POTASSIUM SERPL-SCNC: 3.9 MMOL/L (ref 3.5–5.3)
RBC # BLD AUTO: 3.43 MILLION/UL (ref 3.88–5.62)
RIGHT VENTRICLE ID DIMENSION: 3.2 CM
SINOTUBULAR JUNCTION: 4.2 CM
SL CV LV EF: 45
SL CV PED ECHO LEFT VENTRICLE DIASTOLIC VOLUME (MOD BIPLANE) 2D: 96 ML
SL CV PED ECHO LEFT VENTRICLE SYSTOLIC VOLUME (MOD BIPLANE) 2D: 58 ML
SODIUM SERPL-SCNC: 134 MMOL/L (ref 135–147)
STJ: 4.2 CM
TR MAX PG: 16 MMHG
TR PEAK VELOCITY: 2 M/S
TRICUSPID ANNULAR PLANE SYSTOLIC EXCURSION: 1.4 CM
TRICUSPID VALVE PEAK REGURGITATION VELOCITY: 1.97 M/S
WBC # BLD AUTO: 15.62 THOUSAND/UL (ref 4.31–10.16)

## 2024-09-24 PROCEDURE — 97530 THERAPEUTIC ACTIVITIES: CPT

## 2024-09-24 PROCEDURE — 80048 BASIC METABOLIC PNL TOTAL CA: CPT | Performed by: PHYSICIAN ASSISTANT

## 2024-09-24 PROCEDURE — 93306 TTE W/DOPPLER COMPLETE: CPT | Performed by: INTERNAL MEDICINE

## 2024-09-24 PROCEDURE — 99024 POSTOP FOLLOW-UP VISIT: CPT | Performed by: PHYSICIAN ASSISTANT

## 2024-09-24 PROCEDURE — 97535 SELF CARE MNGMENT TRAINING: CPT

## 2024-09-24 PROCEDURE — 93306 TTE W/DOPPLER COMPLETE: CPT

## 2024-09-24 PROCEDURE — 99232 SBSQ HOSP IP/OBS MODERATE 35: CPT | Performed by: INTERNAL MEDICINE

## 2024-09-24 PROCEDURE — 85027 COMPLETE CBC AUTOMATED: CPT | Performed by: PHYSICIAN ASSISTANT

## 2024-09-24 PROCEDURE — 82948 REAGENT STRIP/BLOOD GLUCOSE: CPT

## 2024-09-24 RX ORDER — BISACODYL 5 MG/1
10 TABLET, DELAYED RELEASE ORAL ONCE
Status: COMPLETED | OUTPATIENT
Start: 2024-09-24 | End: 2024-09-24

## 2024-09-24 RX ORDER — PANTOPRAZOLE SODIUM 40 MG/1
40 TABLET, DELAYED RELEASE ORAL
Qty: 30 TABLET | Refills: 0 | Status: SHIPPED | OUTPATIENT
Start: 2024-09-25

## 2024-09-24 RX ORDER — POLYETHYLENE GLYCOL 3350 17 G/17G
17 POWDER, FOR SOLUTION ORAL DAILY PRN
Start: 2024-09-24

## 2024-09-24 RX ORDER — DOCUSATE SODIUM 100 MG/1
100 CAPSULE, LIQUID FILLED ORAL 2 TIMES DAILY PRN
Start: 2024-09-24

## 2024-09-24 RX ORDER — GUAIFENESIN 1200 MG/1
1200 TABLET, EXTENDED RELEASE ORAL EVERY 12 HOURS SCHEDULED
Start: 2024-09-24

## 2024-09-24 RX ORDER — TORSEMIDE 10 MG/1
10 TABLET ORAL DAILY
Qty: 5 TABLET | Refills: 0 | Status: SHIPPED | OUTPATIENT
Start: 2024-09-25

## 2024-09-24 RX ORDER — ASPIRIN 325 MG
325 TABLET ORAL DAILY
Qty: 30 TABLET | Refills: 3 | Status: SHIPPED | OUTPATIENT
Start: 2024-09-25

## 2024-09-24 RX ORDER — METOPROLOL TARTRATE 50 MG
100 TABLET ORAL EVERY 12 HOURS SCHEDULED
Status: DISCONTINUED | OUTPATIENT
Start: 2024-09-24 | End: 2024-09-24 | Stop reason: HOSPADM

## 2024-09-24 RX ORDER — OXYCODONE HYDROCHLORIDE 5 MG/1
5 TABLET ORAL EVERY 6 HOURS PRN
Qty: 21 TABLET | Refills: 0 | Status: SHIPPED | OUTPATIENT
Start: 2024-09-24 | End: 2024-10-04

## 2024-09-24 RX ORDER — POTASSIUM CHLORIDE 750 MG/1
10 TABLET, EXTENDED RELEASE ORAL DAILY
Qty: 5 TABLET | Refills: 0 | Status: SHIPPED | OUTPATIENT
Start: 2024-09-25

## 2024-09-24 RX ORDER — METOPROLOL TARTRATE 100 MG
100 TABLET ORAL EVERY 12 HOURS SCHEDULED
Qty: 60 TABLET | Refills: 3 | Status: SHIPPED | OUTPATIENT
Start: 2024-09-24

## 2024-09-24 RX ORDER — AMLODIPINE AND VALSARTAN 5; 160 MG/1; MG/1
1 TABLET ORAL DAILY
Qty: 30 TABLET | Refills: 3 | Status: SHIPPED | OUTPATIENT
Start: 2024-09-24

## 2024-09-24 RX ORDER — ACETAMINOPHEN 325 MG/1
650 TABLET ORAL EVERY 6 HOURS PRN
Start: 2024-09-24

## 2024-09-24 RX ORDER — ATORVASTATIN CALCIUM 20 MG/1
20 TABLET, FILM COATED ORAL
Qty: 30 TABLET | Refills: 3 | Status: SHIPPED | OUTPATIENT
Start: 2024-09-24

## 2024-09-24 RX ADMIN — BISACODYL 10 MG: 5 TABLET, COATED ORAL at 10:08

## 2024-09-24 RX ADMIN — TEMAZEPAM 15 MG: 15 CAPSULE ORAL at 00:31

## 2024-09-24 RX ADMIN — POLYETHYLENE GLYCOL 3350 17 G: 17 POWDER, FOR SOLUTION ORAL at 08:01

## 2024-09-24 RX ADMIN — LOSARTAN POTASSIUM 100 MG: 50 TABLET, FILM COATED ORAL at 08:02

## 2024-09-24 RX ADMIN — THIAMINE HCL TAB 100 MG 100 MG: 100 TAB at 08:02

## 2024-09-24 RX ADMIN — FOLIC ACID 1 MG: 1 TABLET ORAL at 08:02

## 2024-09-24 RX ADMIN — METOPROLOL TARTRATE 75 MG: 50 TABLET, FILM COATED ORAL at 08:02

## 2024-09-24 RX ADMIN — POTASSIUM CHLORIDE 10 MEQ: 750 TABLET, EXTENDED RELEASE ORAL at 08:02

## 2024-09-24 RX ADMIN — OXYCODONE HYDROCHLORIDE 5 MG: 5 TABLET ORAL at 00:28

## 2024-09-24 RX ADMIN — DOCUSATE SODIUM 100 MG: 100 CAPSULE, LIQUID FILLED ORAL at 08:02

## 2024-09-24 RX ADMIN — GUAIFENESIN 1200 MG: 600 TABLET, EXTENDED RELEASE ORAL at 08:01

## 2024-09-24 RX ADMIN — AMIODARONE HYDROCHLORIDE 200 MG: 200 TABLET ORAL at 05:44

## 2024-09-24 RX ADMIN — ACETAMINOPHEN 975 MG: 325 TABLET ORAL at 08:02

## 2024-09-24 RX ADMIN — HEPARIN SODIUM 5000 UNITS: 5000 INJECTION INTRAVENOUS; SUBCUTANEOUS at 05:45

## 2024-09-24 RX ADMIN — AMIODARONE HYDROCHLORIDE 200 MG: 200 TABLET ORAL at 14:16

## 2024-09-24 RX ADMIN — ASPIRIN 325 MG ORAL TABLET 325 MG: 325 PILL ORAL at 08:02

## 2024-09-24 RX ADMIN — ACETAMINOPHEN 975 MG: 325 TABLET ORAL at 14:16

## 2024-09-24 RX ADMIN — PANTOPRAZOLE SODIUM 40 MG: 40 TABLET, DELAYED RELEASE ORAL at 05:44

## 2024-09-24 RX ADMIN — Medication 1 TABLET: at 08:02

## 2024-09-24 RX ADMIN — AMLODIPINE BESYLATE 5 MG: 5 TABLET ORAL at 08:02

## 2024-09-24 RX ADMIN — TORSEMIDE 10 MG: 10 TABLET ORAL at 08:02

## 2024-09-24 RX ADMIN — HEPARIN SODIUM 5000 UNITS: 5000 INJECTION INTRAVENOUS; SUBCUTANEOUS at 14:16

## 2024-09-24 NOTE — PLAN OF CARE
Problem: OCCUPATIONAL THERAPY ADULT  Goal: Performs self-care activities at highest level of function for planned discharge setting.  See evaluation for individualized goals.  Description: Treatment Interventions: ADL retraining, Functional transfer training, Cardiac education  Equipment Recommended: Shower/Tub chair with back ($)       See flowsheet documentation for full assessment, interventions and recommendations.   Outcome: Completed  Note: Limitation: Decreased ADL status, Decreased Safe judgement during ADL, Decreased cognition, Decreased endurance, Decreased high-level ADLs, Decreased self-care trans  Prognosis: Good  Assessment: Pt seen for OT treatment session day 2 on this date focused on ADL retraining, functional transfers and mobility, energy conservation, functional endurance, recall of safety precautions, and patient education. Pt was greeted in bed and was cooperative throughout session. Following session, pt was left in chair with chair alarm on and all needs within reach. Pt demonstrates improvements in LB dressing performing with supervision, also performing transfers and FM with MOD I-Supervision with RW. The patient's raw score on the AM-PAC Daily Activity Inpatient Short Form is 22. A raw score of greater than or equal to 19 suggests the patient may benefit from discharge to home. Please refer to the recommendation of the Occupational Therapist for safe discharge planning. Pt is likely close to functional baseline and has assist from family/friends, indicating no further acute OT needs at this time, d/c acute OT. Recommend d/c to home with social support, level III services.     Rehab Resource Intensity Level, OT: III (Minimum Resource Intensity)

## 2024-09-24 NOTE — DISCHARGE INSTRUCTIONS
Instructions Following Open Heart Surgery      Protect your sternum (breast bone). Wires are placed during surgery to hold the sternum together. Do not lift anything heavier than 10 pounds for the first four weeks after surgery. (For example, a gallon of milk weighs 8 pounds) When you are seen for a routine postop check, you will be cleared to lift up to 25 lbs. Following three months from the time of surgery, you may lift without any restrictions.     Hug a pillow to your chest or cross your arms over your chest when you laugh, sneeze, or cough. You may resume sexual activity when you feel ready. Do not put any excessive pressure on your chest.    Be careful when you get into or out of a chair or bed.  Hug a pillow or cross your arms when you stand or sit. Do not twist as you move. Use only your legs to sit and stand. You may need a raised toilet seat if you have trouble standing up without using your arms.     No sleeping on side for the first 4 weeks. Limit daytime naps, to prevent difficulty sleeping at night.    Women: Wear a clean surgical bra every day.     Do not play sports that use your shoulder.  Examples include tennis and golf.    Do not drive until cleared by surgeon. Typically cleared to drive after one month, determination will be made at routine postop appointment. When a passenger in the car, wear a seat belt.    Continue you breathing exercises throughout the day with incentive spirometry    Activity: Your goal is to go on frequent walks, slowly increasing your activity level. Walking will help prevent leg swelling and prevent blood clots. When you start outpatient cardiac rehab in one month, you will be given additional instructions and a formal exercise plan. It is OK to go up steps, with help.     You may resume sexual activity when you are comfortable. Do not put excessive pressure on your chest.     Weigh yourself daily in the morning and keep of log of your weight. If your weight  increases more than 2 lbs per day or more than 5 lbs in a week, call your surgeon's office.    Keep your legs elevated when sitting. Pressure stockings may be worn to prevent significant leg swelling.     You may get routine vaccines any time after open heart surgery    Do not smoke:  Nicotine can damage blood vessels and make it more difficult to heal. Do not use e-cigarettes or smokeless tobacco in place of cigarettes or to help you quit. They still contain nicotine. Ask your primary care provider for information if you currently smoke and need help quitting.     Incision/Wound Care:    Shower daily. Gently wash your incision with warm water and mild soap. Do not scrub the area. Gently pat the area dry with a clean towel. If you have a bandage, dry the area and put on a new, clean bandage. Change your bandage at least daily, or if it gets wet or dirty. Always wash your hands before you care for your wound. Do not apply ointments, creams, lotions, powders, etc. If you develop signs of an infection (fever > 101, redness, warm skin, or drainage) please call your surgeon's office.     Do not pick at or touch puncture sites or incisions. Allow and scabs to come off on their own.     It is normal to have some drainage from the chest tube sites. Apply clean/dry dressings, until this resolves.    You may have discomfort or numbness along the incision for 3-4 weeks. It is normal to occasionally experience brief sharp shooing pains, tightness, or pulling sensations.    Do not take a bath or swim until cleared by surgeon.    As your incision heals, sometimes small tender lumps or pimple-like bumps develop which is due to your body attempting to “dissolve” the suture (stiches).     Call your local emergency number (342 in the [place]) or have someone call if:   You have squeezing, pressure, or pain in your chest or back, lightheadedness or sudden cold sweat  Short of breath that does not go away with rest  You cough up blood.  You  have a fast heartbeat that flutters. Or palpitations  You faint.  Signs of a stroke:  Numbness or drooping on one side of your face. Weakness in an arm or leg. Confusion or difficulty speaking. Dizziness, a severe headache, or vision loss    Call your surgeons office if:   You have bleeding that does not stop even after you apply pressure for 5 minutes.  You have redness, tenderness, or signs of infection at incision (pain, swelling, odor, or green/yellow discharge from incision site)  You have a severe headache.  You have a fever higher than 101°F (38.4°C).  You urinate less, or not at all.  You have persistent nausea, vomiting, or diarrhea  You hear persistent or worsening crunching or grinding in your sternum.  You have questions or concerns about your condition or care.      Diet:    Eat heart-healthy foods, low in unhealthy fats and sodium (salt). A heart healthy diet helps improve your cholesterol levels and lowers your risk for heart disease and stroke. You will receive formal education on healthy eating and label reading during your cardiac rehab in one month.   Choose foods that contain healthy fats, such as soybean, canola, olive, corn, and sunflower oils.  Limit unhealthy fats. Examples include:  Cholesterol  is found in animal foods, such as eggs and lobster, and in dairy products made from whole milk.    Saturated fat  is found in meats, such as golden and hamburger. It is also found in chicken or turkey skin, whole milk, and butter.     Trans fat  is found in packaged foods, such as potato chips and cookies. It is also in some fried foods, and shortening. Do not eat foods that contain trans fats.  Get 20 to 30 grams of fiber each day.  Fruits, vegetables, whole-grain foods, and legumes (cooked beans) are good sources of fiber.  Low Sodium: Limit to 2,000 mg or less each day, unless otherwise directed. Choose low-sodium or no-salt-added foods. Add little or no salt to food you prepare. Use herbs and  spices in place of salt.  Foods high in sodium include frozen dinners, macaroni and cheese, instant potatoes, canned vegetables, cured or smoked meats, hot dogs, golden and sausage, ketchup, barbecue sauce, salad dressing, pickles, olives, soy sauce, and miso.     Fluid Restriction: Fluid restriction after hospital discharge is advised. Limit your fluid intake to no more than 8 cups of liquid (8oz = 1cup) or 2000 mL per day.    Limit/Do not drink alcohol. Alcohol can damage heart and raise your blood pressure. The general recommended limit is 1 drink a day for women 21 or older and for men 65 or older. Do not have more than 3 drinks within 24 hours or 7 within a week. A drink of alcohol is 12 oz of beer, 5 oz of wine, or 1½ oz of liquor.        Narcotic Safety     What do I need to know about narcotic safety?    A narcotic is a type of medicine used to treat pain. When you are discharged from the hospital, you will be prescribed a narcotic called oxycodone. Pain control and management may help you rest, heal, and return to your daily activities. Do not take more than the recommended amount. Too much can cause a life-threatening overdose. Do not continue to take it after your pain stops.     How do I use narcotics safely?   Take prescribed narcotics exactly as directed.  You may develop tolerance. This means you keep needing higher doses to get the same effect. You may also develop narcotic use disorder. This means you are not able to control your narcotic use.  Do not give narcotics to others or take narcotics that belong to someone else.  Do not mix narcotics with other medicines or alcohol.  The combination can cause an overdose, or cause you to stop breathing.   Do not drive or operate heavy machinery after you use a narcotic.    Talk to your healthcare provider if you have any side effects.  Side effects include nausea, sleepiness, itching, and trouble thinking clearly.     What can I do to manage constipation?   Constipation is the most common side effect of narcotic medicine. Constipation is when you have hard, dry bowel movements, or you go longer than usual between bowel movements. The following are ways you can prevent or relieve constipation:  Drink liquids as allowed.  Drinking extra liquids will help soften and move your bowels. You should drink up to your maximum fluid allotment of 2 liters.    Eat high-fiber foods.  This may help decrease constipation by adding bulk to your bowel movements. High-fiber foods include fruits, vegetables, whole-grain breads and cereals, and beans  Supplements. You have been prescribed a fiber supplement called polyethylene glycol (Aleyda lax) and a stool softener called docusate sodium (Colace). You should take these for as long as you continue narcotic medications.  Exercise regularly.  Regular physical activity can help stimulate your intestines. Walking is a good exercise to prevent or relieve constipation. Ask which exercises are best for you.    How do I store narcotics safely?   Store narcotics where others cannot easily get them.  Keep them in a locked cabinet or secure area. Do not  keep them in a purse or other bag you carry with you. A person may be looking for something else and find the narcotics.    Make sure narcotics are stored out of the reach of children.  A child can easily overdose on narcotics. Narcotics may look like candy to a small child.

## 2024-09-24 NOTE — DISCHARGE SUMMARY
Discharge Summary - Cardiac Surgery   Santos Sandoval 59 y.o. male MRN: [id number]  Unit/Bed#: PPHP-315-01 Encounter: [id number]    Admission Date: 9/19/2024     Discharge Date: 09/24/24    Admitting Diagnosis: Acute type A dissection    Primary Discharge Diagnosis:   Acute type A aortic dissection. S/P ascending aorta and full arch replacement, AV resuspension    Secondary Discharge Diagnosis:   HTN, colon polyps, active smoker/tobacco abuse, new dx pre-DM2 (A1C 6.0 this amidssion)   - post op acute blood loss anemia, stable    Attending: Rio Menchaca M.D.    Consulting Physician(s):   Cardiology  Medical critical care    Procedures Performed:   Procedure(s):  BENTALL PROCEDURE ;ASCENDING AORTIC AND ARCH AORTIC REPAIR W/ 28X10 GELWEAVE GRAFT; TOTAL ARCH REPLACEMENT W/ 12X8X8 GELWEAVE GRAFT; DESCENDING AORTIC REPAIR W/ [id number] TAG CONFORMABLE STENT; RIGHT AXILLARY CANNULATION; CIRCULATORY ARREST     Hospital Course:   The patient was seen in consultation emergently on admission for evaluation of acute type A aortic dissection.  Risks and benefits of emergency ascending aorta repair were discussed in detail, and patient was agreeable. Patient was taken directly to the operating room.      9/19: 60 yo M presented to [facility] ED with severe chest pain radiating down into his abdomen and legs. The pain started suddenly this afternoon when he was getting out of the shower. He felt like he pulled a muscle. The pain was associated with diaphoresis and SOB. He has not been taking his BP meds. He is a 40 pack year smoker. CTA was obtained which showed Type A Dissection. The patient was emergently transferred to the OR at [facility] for Ascending aorta and full arch replacement/frozen elephant trunk with endovascular graft/resuspension of the aortic valve. Intraoperative blood products include: 3 plt, 3 FFP, 3 cryo, Factor VII. No significant postoperative bleeding.  Transferred to ICU supported with cardene.  Wean towards  extubation.     9/20: Epi, Primacor, Vaso, and Levo have been weaned off. Remains intubated and supported with Cardene 2.5mg. Postoperatively has woken up and followed commands. Neurovascularly intact with signals to all distal extremities.  Cardiac infusions: Cardene, 2.5 mg/hour; Wean off and start Lopressor, 25mg PO BID via OGT. Cardiac index > 2.2; D/C Partridge Nini catheter. Maintain Arterial line. Start Lasix 40 mg IV BID. Continue Potassium replacement scales. Wean towards extubation. Maintain ICU level of care.     9/21: Weaned off levo. HTN, started on cardene, started on Lopressor 25, now cardene at 2.5, wean cardene to off, add Lisinopril if needed to get off cardene. Delined. Extubated to 2LNC, wean as tolerated. Start Lasix 40mg x1. Maintain CTs & EPWs. Transfer to stepdown.     9/22: Transferred out of ICU. Given Ativan x1 for agitation this AM, drinking 3 beers/week per him again on eval this AM. NSR w/ PACs, increase Lopressor to 50mg. Discontinue CTs & EPWs. Transition to Torsemide 10mg QDay. On 3LNC, wean as tolerated. Decrease Lipitor to 20mg w/ lipid panel results & no h/o CAD.     9/23: SR 70s (50s overnight), on RA. Hypertensive with /99. Weight at baseline range. -1300. Hgb 10, cr 0.92. Eating/drinking well. + passing gas, no BM yet. Coughing with mucous production. Add mucinex. Increase lopressor 75mg BID, stop ACE and add losartan 100mg (home ARB equivalent), add norvasc 5mg daily, cont torsemide 10mg daily. Give PO dulcolax for BM. For d/c next 24-48 hours.      9/24: BP improving, SBP 140s, SR 80s. Increase lopressor to 100mg BID. Cont norvasc 5 mg and losartan 100mg daily. Labs stable. Feels well. Passing frequent gas. Give another PO dulcolax. Ambulating well. Discharge home today.      Condition at Discharge:   good     Discharge Physical Exam:    Please see the documented physical exam from this morning's progress note for details.    Discharge Data:  Results from last 7 days   Lab  Units 09/24/24  0454 09/23/24  0530 09/22/24  0425   WBC Thousand/uL 15.62* 15.79* 19.75*   HEMOGLOBIN g/dL 10.1* 10.0* 9.8*   HEMATOCRIT % 30.3* 30.1* 29.8*   PLATELETS Thousands/uL 221 201 187     Results from last 7 days   Lab Units 09/24/24  0454 09/23/24  0530 09/22/24  0425 09/20/24  0701 09/20/24  0432   POTASSIUM mmol/L 3.9 3.8 4.5   < >  --    CHLORIDE mmol/L 100 101 100   < >  --    CO2 mmol/L 24 24 26   < >  --    CO2, I-STAT mmol/L  --   --   --   --  28   BUN mg/dL 20 30* 27*   < >  --    CREATININE mg/dL 0.81 0.92 0.97   < >  --    GLUCOSE, ISTAT mg/dl  --   --   --   --  171*   CALCIUM mg/dL 8.4 8.2* 8.5   < >  --     < > = values in this interval not displayed.     Results from last 7 days   Lab Units 09/21/24  0435 09/20/24  0712 09/20/24  0350   INR  1.20* 0.70* 0.76*   PTT seconds  --  31 35*       Discharge instructions/Information to patient and family:   See after visit summary for information provided to patient and family.      Santos Sandoval was educated on restrictions regarding driving and lifting, and techniques of proper incisional care.  They were specifically counselled on signs and symptoms of an incisional infection, and advised to contact our service immediately should they develop fevers, sweats, chill, redness or drainage at the site of any incisions.    Provisions for Follow-Up Care:  See after visit summary for information related to follow-up care and any pertinent home health orders.      Disposition:  Home    Planned Readmission:   No    Discharge Medications:  See after visit summary for reconciled discharge medications provided to patient and family.      Santos Sandoval was provided contact information and scheduled a follow up appointment with Rio Menchaca M.D.  Additionally, follow up appointments have been scheduled for their primary care physician and primary cardiologist.  Contact information was provided.    Santos Sandoval was counseled on the importance of  avoiding tobacco products.  As with all patients whom have undergone open heart surgery, tobacco cessation medication was contraindicated at the time of discharge.     ACE/ARB was Prescribed at discharge    Beta Blocker was Prescribed at discharge    Aspirin was Prescribed at discharge    Statin was Prescribed at discharge      The patient was discharged on ongoing diuretic therapy with Torsemide, 10 mg, PO QD and Potassium Chloride 10 mEq, PO QD.  They were advised to continue these medications for 5 days, unless otherwise directed.     Narcotic pain medication was prescribed in the form of oxycodone 5mg Q6HPRN (21 tabs).  Prior to prescribing, their prescription profile was reviewed on the [facility] of health prescription drug monitoring program.    The patient was informed that following their postoperative surgical evaluation, they will be referred to outpatient cardiac rehabilitation.  They were counseled that this program is run by specialists who will help them safely strengthen their heart and prevent more heart disease.  Cardiac rehabilitation will include exercise, relaxation, stress management, and heart-healthy nutrition.  Caregivers will also check to make sure their medication regimen is working.    During this admission, the patient was questioned on their use of tobacco, alcohol, and illicit/non-prescription drug use in the  previous 24 months. During this time frame they admit to using tobacco. As such they have been counseled on the importance of cessation and abstinence.     I spent 30 minutes discharging the patient. This time was spent on the day of discharge. I had direct contact with the patient on the day of discharge. Additional documentation is required if more than 30 minutes were spent on discharge.     SIGNATURE: Luis Angel Vaughn PA-C  DATE: September 24, 2024  TIME: 9:26 AM

## 2024-09-24 NOTE — PROGRESS NOTES
Progress Note - Cardiac Surgery   Santos Sandoval 59 y.o. male MRN: [id number]  Unit/Bed#: PPHP-315-01 Encounter: [id number]    Type A aortic dissection s/p ascending aorta replacement, re-suspension of the aortic valve, and frozen elephant trunk full arch replacement with a [place] TAG 34 x 34 x 150 endovascular graft; POD # 5      24 Hour Events: SR, RA, BP improving. Feels well, passing gas, feels like about to have BM. No abd pain, no N/V. Ambulating well. Easier to cough secretions after mucinex added (smoker on admit). Echo ordered by cardiology and is pending. Wants to go home. HR 80s, /76.       Medications:   Scheduled Meds:  Current Facility-Administered Medications   Medication Dose Route Frequency Provider Last Rate    acetaminophen  975 mg Oral Q6H While awake Sukumar Hillman PA-C      amiodarone  200 mg Oral Q8H [facility] Sukumar Hillman PA-C      amLODIPine  5 mg Oral Daily Luis Angel Vaughn PA-C      aspirin  325 mg Oral Daily Sukumar Hillman PA-C      atorvastatin  20 mg Oral Daily With Dinner Keily Payton PA-C      docusate sodium  100 mg Oral BID Sukumar Hillman PA-C      folic acid  1 mg Oral Daily Sukumar Hillman PA-C      guaiFENesin  1,200 mg Oral Q12H [facility] Luis Angel Vaughn PA-C      heparin (porcine)  5,000 Units Subcutaneous Q8H [facility] Sukumar Hillman PA-C      hydrALAZINE  5 mg Intravenous Q6H PRN Temi Henry PA-C      insulin lispro  1-6 Units Subcutaneous TID AC Sukumar Hillman PA-C      insulin lispro  1-6 Units Subcutaneous HS Sukumar Hillman PA-C      losartan  100 mg Oral Daily Luis Angel Vaughn PA-C      metoprolol tartrate  100 mg Oral Q12H [facility] Luis Angel Vaughn PA-C      multivitamin-minerals  1 tablet Oral Daily Sukumar Hillman PA-C      ondansetron  4 mg Intravenous Q6H PRN Sukumar Hillman PA-C      oxyCODONE  2.5 mg Oral Q4H PRN Sukumar Hillman PA-C      Or    oxyCODONE  5 mg Oral Q4H PRN Sukumar Mathiesen, PA-C      pantoprazole  40 mg Oral Early Morning Sukumar Hillman PA-C       polyethylene glycol  17 g Oral Daily Sukumar Hillman PA-C      potassium chloride  10 mEq Oral Daily Keily Payton PA-C      temazepam  15 mg Oral HS PRN Sukumar Hillman PA-C      thiamine  100 mg Oral Daily Sukumar Hillman PA-C      torsemide  10 mg Oral Daily Keily Payton PA-C       Continuous Infusions:   PRN Meds:.  hydrALAZINE    ondansetron    oxyCODONE **OR** oxyCODONE    temazepam    Vitals:   Vitals:    09/24/24 0213 09/24/24 0542 09/24/24 0755 09/24/24 0755   BP: 135/72  144/76 144/76   Pulse: 63  83 87   Resp:       Temp: 97.6 °F (36.4 °C)  99.1 °F (37.3 °C) 99.1 °F (37.3 °C)   TempSrc:       SpO2: 95%  92% 94%   Weight:  77.6 kg (171 lb 1.2 oz)         Telemetry: NSR 70s    Respiratory:   SpO2: SpO2: 94 %, RA  Intake/Output:     Intake/Output Summary (Last 24 hours) at 9/24/2024 0835  Last data filed at 9/24/2024 0456  Gross per 24 hour   Intake 568 ml   Output 300 ml   Net 268 ml       Weights:   Weight (last 2 days)       Date/Time Weight    09/24/24 0542 77.6 (171.08)    09/23/24 0600 78.9 (173.94)    09/22/24 0533 81.8 (180.34)          Admission weight: No preop weight obtained due to emergency  Per pt baseline weight apprx 175lbs    Chest tubes have been removed      Results:   Results from last 7 days   Lab Units 09/24/24  0454 09/23/24  0530 09/22/24  0425   WBC Thousand/uL 15.62* 15.79* 19.75*   HEMOGLOBIN g/dL 10.1* 10.0* 9.8*   HEMATOCRIT % 30.3* 30.1* 29.8*   PLATELETS Thousands/uL 221 201 187     Results from last 7 days   Lab Units 09/24/24  0454 09/23/24  0530 09/22/24  0425 09/20/24  0701 09/20/24  0432   SODIUM mmol/L 134* 135 134*   < >  --    POTASSIUM mmol/L 3.9 3.8 4.5   < >  --    CHLORIDE mmol/L 100 101 100   < >  --    CO2 mmol/L 24 24 26   < >  --    CO2, I-STAT mmol/L  --   --   --   --  28   BUN mg/dL 20 30* 27*   < >  --    CREATININE mg/dL 0.81 0.92 0.97   < >  --    GLUCOSE, ISTAT mg/dl  --   --   --   --  171*   CALCIUM mg/dL 8.4 8.2* 8.5   < >  --     < > = values in  this interval not displayed.     Recent Labs     09/22/24  0425   MG 2.1     Results from last 7 days   Lab Units 09/21/24  0435 09/20/24  0712 09/20/24  0350   INR  1.20* 0.70* 0.76*   PTT seconds  --  31 35*     Point of care glucose:     Studies:  No new studies    No pertinent imaging studies reviewed.    Invasive Lines/Tubes:  Invasive Devices       Central Venous Catheter Line  Duration             CVC Central Lines 09/19/24 4 days                    Physical Exam:    General: No acute distress, Alert, and Normal appearance  HEENT/NECK:  Normocephalic. Atraumatic.  NO jugular venous distention.    Cardiac: Regular rate and rhythm and No murmurs/rubs/gallops  Pulmonary:   + rhonchi bilaterally, good air movement bilaterally, decreased in bases  Abdomen:  Non-tender, Non-distended, and Normal bowel sounds  Incisions: Sternum is stable.  Incision is clean, dry, and intact.   Extremities: Extremities warm/dry and No edema B/L  Neuro: Alert and oriented X 3 and No focal deficits  Skin: Warm/Dry, without rashes or lesions.       Assessment:  Principal Problem:    Type 1 dissection of ascending aorta (HCC)  Active Problems:    Essential hypertension    Smoking    Hx of colonoscopy with polypectomy    S/P aortic dissection repair    Acute blood loss as cause of postoperative anemia    Leukocytosis    Anemia    Hyponatremia    Bradycardia    Mitral regurgitation       Type A aortic dissection s/p ascending aorta replacement, re-suspension of the aortic valve, and frozen elephant trunk full arch replacement with a [place] TAG 34 x 34 x 150 endovascular graft; POD # 5    Plan:    Cardiac:     Emergent surgical admission for aortic dissection  Normal ventricular systolic function, EF 50%    NSR; HR 80s, HTN/BP improving    Increase to Lopressor, 100mg PO BID    ACE inhibitor/ARB indicated; Start Losartan, 100mg PO Daily  (Home dose equivalent of valsartan 160mg)    Cont  Norvasc 5mg daily    Continue prophylactic  Amiodarone, 200 mg PO TID    Continue ASA and Statin therapy    Epicardial pacing wires no longer required.  Remove today    Maintain central IV access today for medications requiring central IV access    Continue Arixtra for DVT prophylaxis    Pulmonary:     Acute post-op pulmonary insufficiency; improved, secondary to history of tobacco use, atelectasis, pulmonary vascular congestion, and poor inspiratory effort secondary to pain. Continue incentive spirometry/coughing/deep breathing exercises.  Wean supplemental oxygen as tolerated for saturation > 90%.    Chest tubes have been discontinued    Renal:     Normal preoperative renal function    Intake/Output net: (-)1580 mL/24 hours    Diuretic Regimen:  Continue PO Torsemide, 10 mg QD  Continue Potassium Chloride 10 mEq PO QD    Neuro:    Neurologically intact; No active issues     Incisional pain well controlled   Continue tylenol, 975 mg PO q 8, standing dose   Continue oxycodone, 2.5 to 5 mg PO q 4 hours prn pain     Continue thiamine/folic acid/multivitamin for ETOH use PTA, Ativan x1/24hrs    GI:    Cardiac diet, with 1800 mL fluid restriction    Tolerating diet without complaint    Continue stool softeners and prn suppository    Continue GI prophylaxis    Endo:     Pre-Op Hgb A1C: 6.0    Patient has been transitioned from continuous insulin infusion to intermittent subcutaneous dosing  Serum glucose well controlled on insulin sliding scale coverage    7    Hematology:     Post-operative acute blood loss anemia; Hemoglobin 10.0; stable    Post op leukocytosis; Afebrile with no signs of infection; reactive from surgery, improving     Post op hyponatremia, improved    8.   Disposition:        Discharge home today    VTE Pharmacologic Prophylaxis: Fondaparinux (Arixtra)  VTE Mechanical Prophylaxis: sequential compression device    Collaborative rounds completed with supervising physician  Plan of care discussed with bedside nurse    SIGNATURE: Luis Angel Vaughn,  DAVID  DATE: September 24, 2024  TIME: 8:35 AM    <<-----Click here for Discharge Medication Review

## 2024-09-24 NOTE — ASSESSMENT & PLAN NOTE
-Type A dissection   - s/p urgent repair 9/19  -Continue routine postop care  -Continued on beta-blocker

## 2024-09-24 NOTE — PROGRESS NOTES
"Progress Note - Cardiology   Name: Santos Sandoval 59 y.o. male I MRN: [id number]  Unit/Bed#: [facility]PHP-315-01 I Date of Admission: 2024   Date of Service: 2024 I Hospital Day: 5     Assessment & Plan  Type 1 dissection of ascending aorta (HCC)  -Type A dissection   - s/p urgent repair   -Continue routine postop care  -Continued on beta-blocker  S/P aortic dissection repair  -Postop day #5 ascending aorta/hemiarch, resuspension of aortic valve     Essential hypertension  - Goal BP on average below 130/80.     Smoking  -tobacco abuse up to hospitalization  Acute blood loss as cause of postoperative anemia  -H&H stable, follow closely post-op   Bradycardia  -Presented sinus bradycardia  -resolved. HR 60-70's, tolerating BB  Mitral regurgitation  -Mild MR on echo today.     Cardiology Progress Note - Santos Sandoval 59 y.o. male MRN: [id number]    Unit/Bed#: PPHP-315-01 Encounter: [id number]        Subjective:    No significant events overnight.  No chest pain     Review of Systems   Constitutional: Negative for malaise/fatigue.   Cardiovascular:  Negative for chest pain.   Respiratory:  Negative for shortness of breath.        Objective:   Vitals: Blood pressure 144/76, pulse 87, temperature 99.1 °F (37.3 °C), resp. rate 18, height 5' 10\" (1.778 m), weight 77.6 kg (171 lb), SpO2 94%., Body mass index is 24.54 kg/m².,   Orthostatic Blood Pressures      Flowsheet Row Most Recent Value   Blood Pressure 144/76 filed at 2024 0940   Patient Position - Orthostatic VS Lying filed at 2024 0400           Systolic (24hrs), Av , Min:133 , Max:157     Diastolic (24hrs), Av, Min:69, Max:100      Intake/Output Summary (Last 24 hours) at 2024 1009  Last data filed at 2024 1001  Gross per 24 hour   Intake 868 ml   Output 300 ml   Net 568 ml     Weight (last 2 days)       Date/Time Weight    24 0940 77.6 (171)    24 0542 77.6 (171.08)    24 0600 78.9 (173.94)    24 0533 81.8 " (180.34)              Telemetry Review: No significant arrhythmias seen on telemetry review. NSR    Physical Exam  Vitals reviewed.   Constitutional:       Appearance: Normal appearance.   HENT:      Head: Normocephalic.      Nose: Nose normal.      Mouth/Throat:      Mouth: Mucous membranes are moist.      Pharynx: Oropharynx is clear.   Eyes:      General: No scleral icterus.     Conjunctiva/sclera: Conjunctivae normal.   Cardiovascular:      Rate and Rhythm: Normal rate and regular rhythm.      Heart sounds: No murmur heard.     No friction rub. No gallop.   Pulmonary:      Effort: Pulmonary effort is normal. No respiratory distress.      Breath sounds: Normal breath sounds. No wheezing or rales.   Abdominal:      General: Abdomen is flat. Bowel sounds are normal. There is no distension.      Palpations: Abdomen is soft.      Tenderness: There is no abdominal tenderness. There is no guarding.   Musculoskeletal:      Cervical back: Normal range of motion and neck supple.      Right lower leg: No edema.      Left lower leg: No edema.   Skin:     General: Skin is warm and dry.   Neurological:      General: No focal deficit present.      Mental Status: He is alert and oriented to person, place, and time.   Psychiatric:         Mood and Affect: Mood normal.         Behavior: Behavior normal.           Laboratory Results:        CBC with diff:   Results from last 7 days   Lab Units 09/24/24  0454 09/23/24  0530 09/22/24  0425 09/21/24  0435 09/20/24  1257 09/20/24  0847 09/20/24  0833 09/20/24  0353 09/20/24  0350 09/19/24  1925 09/19/24  1607   WBC Thousand/uL 15.62* 15.79* 19.75* 15.38*  --  10.38* 9.88  --   --   --  13.89*   HEMOGLOBIN g/dL 10.1* 10.0* 9.8* 9.6* 10.9* 11.0* 10.5*  --   --   --  14.4   I STAT HEMOGLOBIN   --   --   --   --   --   --   --    < >  --    < >  --    HEMATOCRIT % 30.3* 30.1* 29.8* 29.6* 32.7* 33.3* 31.7*  --   --   --  44.2   HEMATOCRIT, ISTAT   --   --   --   --   --   --   --    < >   --    < >  --    MCV fL 88 90 89 89  --  88 89  --   --   --  89   PLATELETS Thousands/uL 221 201 187 161  --  182 176  --  192   < > 240   RBC Million/uL 3.43* 3.34* 3.36* 3.31*  --  3.77* 3.58*  --   --   --  4.97   MCH pg 29.4 29.9 29.2 29.0  --  29.2 29.3  --   --   --  29.0   MCHC g/dL 33.3 33.2 32.9 32.4  --  33.0 33.1  --   --   --  32.6   RDW % 13.2 13.2 13.3 13.4  --  13.2 13.2  --   --   --  12.7   MPV fL 9.1 9.5 10.0 9.4  --  9.2 9.0  --  9.0   < > 8.2*   NRBC AUTO /100 WBCs  --   --   --   --   --   --   --   --   --   --  0    < > = values in this interval not displayed.         CMP:  Results from last 7 days   Lab Units 09/24/24  0454 09/23/24  0530 09/22/24  0425 09/21/24  0435 09/20/24  1946 09/20/24  1257 09/20/24  0701 09/20/24  0432 09/20/24  0353 09/20/24  0259 09/20/24  0220 09/20/24  0157 09/20/24  0145 09/20/24  0116 09/19/24  1925 09/19/24  1607   POTASSIUM mmol/L 3.9 3.8 4.5 4.3 3.9 4.3 3.9  --   --   --   --   --   --   --   --  4.0   CHLORIDE mmol/L 100 101 100 101 103  --  109*  --   --   --   --   --   --   --   --  101   CO2 mmol/L 24 24 26 27 27  --  27  --   --   --   --   --   --   --   --  27   CO2, I-STAT mmol/L  --   --   --   --   --   --   --  28 27 28  --  24 24 24   < >  --    BUN mg/dL 20 30* 27* 15 14  --  20  --   --   --   --   --   --   --   --  25   CREATININE mg/dL 0.81 0.92 0.97 0.90 0.85  --  1.22  --   --   --   --   --   --   --   --  1.33*   GLUCOSE, ISTAT mg/dl  --   --   --   --   --   --   --  171* 211* 254* 283* 280* 284* 285*   < >  --    CALCIUM mg/dL 8.4 8.2* 8.5 7.8* 7.5*  --  8.6  --   --   --   --   --   --   --   --  9.4   AST U/L  --   --   --   --   --   --   --   --   --   --   --   --   --   --   --  18   ALT U/L  --   --   --   --   --   --   --   --   --   --   --   --   --   --   --  17   ALK PHOS U/L  --   --   --   --   --   --   --   --   --   --   --   --   --   --   --  50   EGFR ml/min/1.73sq m 95 89 83 91 94  --  63  --   --   --   --   --   " --   --   --  57    < > = values in this interval not displayed.         BMP:  Results from last 7 days   Lab Units 09/24/24  0454 09/23/24  0530 09/22/24 0425 09/21/24 0435 09/20/24 1946 09/20/24  1257 09/20/24  0701 09/20/24  0432 09/19/24  1925 09/19/24  1607   POTASSIUM mmol/L 3.9 3.8 4.5 4.3 3.9 4.3 3.9  --   --  4.0   CHLORIDE mmol/L 100 101 100 101 103  --  109*  --   --  101   CO2 mmol/L 24 24 26 27 27  --  27  --   --  27   CO2, I-STAT mmol/L  --   --   --   --   --   --   --  28   < >  --    BUN mg/dL 20 30* 27* 15 14  --  20  --   --  25   CREATININE mg/dL 0.81 0.92 0.97 0.90 0.85  --  1.22  --   --  1.33*   GLUCOSE, ISTAT mg/dl  --   --   --   --   --   --   --  171*   < >  --    CALCIUM mg/dL 8.4 8.2* 8.5 7.8* 7.5*  --  8.6  --   --  9.4    < > = values in this interval not displayed.       BNP: No results for input(s): \"BNP\" in the last 72 hours.    Magnesium:   Results from last 7 days   Lab Units 09/22/24 0425 09/21/24 0435 09/20/24 1946 09/20/24  0701   MAGNESIUM mg/dL 2.1 2.2 1.9 3.0*       Coags:   Results from last 7 days   Lab Units 09/21/24 0435 09/20/24  0712 09/20/24  0350   PTT seconds  --  31 35*   INR  1.20* 0.70* 0.76*       TSH: No results found for: \"TSH\"    Hemoglobin A1C   Results from last 7 days   Lab Units 09/20/24  0833   HEMOGLOBIN A1C % 6.0*       Lipid Profile:   Results from last 7 days   Lab Units 09/20/24  0701   TRIGLYCERIDES mg/dL 51   HDL mg/dL 39*       Cardiac testing:   No results found for this or any previous visit.    No results found for this or any previous visit.    No results found for this or any previous visit.    No results found for this or any previous visit.      Meds/Allergies   current meds:   Current Facility-Administered Medications:     acetaminophen (TYLENOL) tablet 975 mg, Q6H While awake    amiodarone tablet 200 mg, Q8H HOSEA    amLODIPine (NORVASC) tablet 5 mg, Daily    aspirin tablet 325 mg, Daily    atorvastatin (LIPITOR) tablet 20 mg, " Daily With Dinner    docusate sodium (COLACE) capsule 100 mg, BID    folic acid (FOLVITE) tablet 1 mg, Daily    guaiFENesin (MUCINEX) 12 hr tablet 1,200 mg, Q12H HOSEA    heparin (porcine) subcutaneous injection 5,000 Units, Q8H HOSEA    hydrALAZINE (APRESOLINE) injection 5 mg, Q6H PRN    insulin lispro (HumALOG/ADMELOG) 100 units/mL subcutaneous injection 1-6 Units, TID AC **AND** Fingerstick Glucose (POCT), TID AC    insulin lispro (HumALOG/ADMELOG) 100 units/mL subcutaneous injection 1-6 Units, HS    losartan (COZAAR) tablet 100 mg, Daily    metoprolol tartrate (LOPRESSOR) tablet 100 mg, Q12H HOSEA    multivitamin-minerals (CENTRUM) tablet 1 tablet, Daily    ondansetron (ZOFRAN) injection 4 mg, Q6H PRN    oxyCODONE (ROXICODONE) split tablet 2.5 mg, Q4H PRN **OR** oxyCODONE (ROXICODONE) IR tablet 5 mg, Q4H PRN    pantoprazole (PROTONIX) EC tablet 40 mg, Early Morning    polyethylene glycol (MIRALAX) packet 17 g, Daily    potassium chloride (Klor-Con M10) CR tablet 10 mEq, Daily    temazepam (RESTORIL) capsule 15 mg, HS PRN    thiamine tablet 100 mg, Daily    torsemide (DEMADEX) tablet 10 mg, Daily    Medications Prior to Admission:     amLODIPine-valsartan (EXFORGE) 5-160 MG per tablet    baclofen 10 mg tablet       Assessment:  Principal Problem:    Type 1 dissection of ascending aorta (HCC)  Active Problems:    Essential hypertension    Smoking    Hx of colonoscopy with polypectomy    S/P aortic dissection repair    Acute blood loss as cause of postoperative anemia    Leukocytosis    Anemia    Hyponatremia    Bradycardia    Mitral regurgitation            Counseling / Coordination of Care  Total floor / unit time spent today 25 minutes.  Greater than 50% of total time was spent with the patient and / or family counseling and / or coordination of care.  A description of the counseling / coordination of care.

## 2024-09-24 NOTE — PLAN OF CARE
Problem: INFECTION - ADULT  Goal: Absence or prevention of progression during hospitalization  Description: INTERVENTIONS:  - Assess and monitor for signs and symptoms of infection  - Monitor lab/diagnostic results  - Monitor all insertion sites, i.e. indwelling lines, tubes, and drains  - Monitor endotracheal if appropriate and nasal secretions for changes in amount and color  - [place] appropriate cooling/warming therapies per order  - Administer medications as ordered  - Instruct and encourage patient and family to use good hand hygiene technique  - Identify and instruct in appropriate isolation precautions for identified infection/condition  Outcome: Progressing     Problem: DISCHARGE PLANNING  Goal: Discharge to home or other facility with appropriate resources  Description: INTERVENTIONS:  - Identify barriers to discharge w/patient and caregiver  - Arrange for needed discharge resources and transportation as appropriate  - Identify discharge learning needs (meds, wound care, etc.)  - Arrange for interpretive services to assist at discharge as needed  - Refer to Case Management Department for coordinating discharge planning if the patient needs post-hospital services based on physician/advanced practitioner order or complex needs related to functional status, cognitive ability, or social support system  Outcome: Progressing

## 2024-09-24 NOTE — OCCUPATIONAL THERAPY NOTE
Occupational Therapy Progress Note     Patient Name: Santos Sandoval  Today's Date: 9/24/2024  Problem List  Principal Problem:    Type 1 dissection of ascending aorta (HCC)  Active Problems:    Essential hypertension    Smoking    Hx of colonoscopy with polypectomy    S/P aortic dissection repair    Acute blood loss as cause of postoperative anemia    Leukocytosis    Anemia    Hyponatremia    Bradycardia    Mitral regurgitation            09/24/24 1043   OT Last Visit   OT Visit Date 09/24/24   Note Type   Note Type Treatment   Pain Assessment   Pain Assessment Tool 0-10   Pain Score 4   Pain Location/Orientation Orientation: Mid;Location: Chest;Location: Incision   Patient's Stated Pain Goal No pain   Hospital Pain Intervention(s) Repositioned;Ambulation/increased activity   Restrictions/Precautions   Other Precautions Cardiac/sternal;Telemetry;Pain   Lifestyle   Autonomy Pt reports being completely independent with ADLs, IADLs, and functional mobility without use of DME PTA.  Pt is a    Reciprocal Relationships has a supportive sister locally that can provide some assist   Service to Others works FT at a [employer]   ADL   Where Assessed Edge of bed   LB Dressing Assistance 5  Supervision/Setup   LB Dressing Deficit Supervision/safety   LB Dressing Comments Pt threads BLE through pants, pulls over hips   Bed Mobility   Supine to Sit 6  Modified independent   Additional Comments Pt greeted in bed, left in chair with all needs within reach.   Transfers   Sit to Stand 6  Modified independent   Stand to Sit 6  Modified independent   Additional Comments with RW   Functional Mobility   Functional Mobility 5  Supervision   Additional Comments Pt performs household distance mobility with one standing rest break, supervision with RW   Additional items Rolling walker   Cognition   Overall Cognitive Status WFL   Arousal/Participation Cooperative   Attention Within functional limits   Orientation Level Oriented X4    Memory Decreased recall of precautions   Following Commands Follows one step commands without difficulty   Comments Pt cooperative to therapy, verbalizes understanding to all provided education.   Additional Activities   Additional Activities Other (Comment)  (Recovering from Cardiac Surgery education packet)   Additional Activities Comments Pt provided s/p cardiac sx education packet, reviewed w/ OT, discussed cardiac/sternal precautions, lifestyle modifications, post hospitalization IADL management, environmental temperature control, emotional regulation and management, lifting/driving restrictions, energy conservation techniques, mobility schedule, incisional management, VNA, and cardiac rehabilitation initiation upon clearance per physician at follow up. Pt reviewed education packet and acknowledged reception of such.   Activity Tolerance   Activity Tolerance Patient tolerated treatment well   Medical Staff Made Aware RN cleared   Assessment   Assessment Pt seen for OT treatment session day 2 on this date focused on ADL retraining, functional transfers and mobility, energy conservation, functional endurance, recall of safety precautions, and patient education. Pt was greeted in bed and was cooperative throughout session. Following session, pt was left in chair with chair alarm on and all needs within reach. Pt demonstrates improvements in LB dressing performing with supervision, also performing transfers and FM with MOD I-Supervision with RW. The patient's raw score on the AM-PAC Daily Activity Inpatient Short Form is 22. A raw score of greater than or equal to 19 suggests the patient may benefit from discharge to home. Please refer to the recommendation of the Occupational Therapist for safe discharge planning. Pt is likely close to functional baseline and has assist from family/friends, indicating no further acute OT needs at this time, d/c acute OT. Recommend d/c to home with social support, level III  services.   Plan   Treatment Interventions ADL retraining;Functional transfer training;Cardiac education   Goal Expiration Date 10/01/24   OT Treatment Day 2   OT Frequency Other (comment)  (d/c acute OT)   Discharge Recommendation   Rehab Resource Intensity Level, OT III (Minimum Resource Intensity)   AM-PAC Daily Activity Inpatient   Lower Body Dressing 3   Bathing 3   Toileting 4   Upper Body Dressing 4   Grooming 4   Eating 4   Daily Activity Raw Score 22   Daily Activity Standardized Score (Calc for Raw Score >=11) 47.1   AM-PAC Applied Cognition Inpatient   Following a Speech/Presentation 4   Understanding Ordinary Conversation 4   Taking Medications 4   Remembering Where Things Are Placed or Put Away 3   Remembering List of 4-5 Errands 3   Taking Care of Complicated Tasks 3   Applied Cognition Raw Score 21   Applied Cognition Standardized Score 44.3   End of Consult   Education Provided Yes   Patient Position at End of Consult Bedside chair;All needs within reach   Nurse Communication Nurse aware of consult       ANNEMARIE Wiggins, OTR/L

## 2024-09-25 ENCOUNTER — TELEPHONE (OUTPATIENT)
Dept: INTERNAL MEDICINE CLINIC | Facility: OTHER | Age: 59
End: 2024-09-25

## 2024-09-25 ENCOUNTER — HOME CARE VISIT (OUTPATIENT)
Dept: HOME HEALTH SERVICES | Facility: HOME HEALTHCARE | Age: 59
End: 2024-09-25
Payer: COMMERCIAL

## 2024-09-25 VITALS
RESPIRATION RATE: 16 BRPM | TEMPERATURE: 97.4 F | OXYGEN SATURATION: 95 % | HEART RATE: 70 BPM | SYSTOLIC BLOOD PRESSURE: 118 MMHG | DIASTOLIC BLOOD PRESSURE: 70 MMHG

## 2024-09-25 PROCEDURE — G0299 HHS/HOSPICE OF RN EA 15 MIN: HCPCS

## 2024-09-25 PROCEDURE — 400013 VN SOC

## 2024-09-25 PROCEDURE — 88304 TISSUE EXAM BY PATHOLOGIST: CPT | Performed by: PATHOLOGY

## 2024-09-25 PROCEDURE — 88311 DECALCIFY TISSUE: CPT | Performed by: PATHOLOGY

## 2024-09-25 NOTE — TELEPHONE ENCOUNTER
Received the following staff message from the hospital:    Patient of Dr. Moreira is being discharged from [facility] today , and will need a TCM appointment.   S/P Caroline ( Open heart surgery )     Please contact the patient to schedule.

## 2024-09-25 NOTE — CASE COMMUNICATION
Medication discrepancies or Major drug interactions__Santos is not taking colace/miralax/mucinex    Abnormal clinical findings__patient has frequent moist cough + white secretions. per patient he quit smoking 2 weeks ago and had previously been a smoker for 42 years. bilateral lungs CTA    This report is informational only, no response is needed  [facility] has Admitted your patient to Home Health service with the following disciplin es: SN  Patient stated goals of care__to get back to my normal life again    Potential barriers to goal achievement__n/a    Primary focus of home health care:Cardiac Circulatory    Anticipated visit pattern and next visit date__SN 2W4__CM to assess patients need beyond this time frame    Thank you for allowing us to participate in the care of your patient.      Rosalinda Caballero RN

## 2024-09-27 ENCOUNTER — HOME CARE VISIT (OUTPATIENT)
Dept: HOME HEALTH SERVICES | Facility: HOME HEALTHCARE | Age: 59
End: 2024-09-27
Payer: COMMERCIAL

## 2024-09-27 VITALS
SYSTOLIC BLOOD PRESSURE: 128 MMHG | HEART RATE: 89 BPM | RESPIRATION RATE: 18 BRPM | OXYGEN SATURATION: 96 % | DIASTOLIC BLOOD PRESSURE: 66 MMHG | TEMPERATURE: 97.3 F

## 2024-09-27 PROCEDURE — G0299 HHS/HOSPICE OF RN EA 15 MIN: HCPCS

## 2024-09-30 ENCOUNTER — HOSPITAL ENCOUNTER (EMERGENCY)
Facility: HOSPITAL | Age: 59
Discharge: HOME | End: 2024-09-30
Attending: EMERGENCY MEDICINE
Payer: COMMERCIAL

## 2024-09-30 ENCOUNTER — APPOINTMENT (EMERGENCY)
Dept: RADIOLOGY | Facility: HOSPITAL | Age: 59
End: 2024-09-30
Payer: COMMERCIAL

## 2024-09-30 VITALS
TEMPERATURE: 97.7 F | WEIGHT: 180 LBS | HEIGHT: 71 IN | BODY MASS INDEX: 25.2 KG/M2 | DIASTOLIC BLOOD PRESSURE: 62 MMHG | HEART RATE: 74 BPM | RESPIRATION RATE: 18 BRPM | SYSTOLIC BLOOD PRESSURE: 124 MMHG | OXYGEN SATURATION: 99 %
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
## 2024-09-30 LAB
2HR DELTA HS TROPONIN: 4 NG/L
ALBUMIN SERPL BCG-MCNC: 3.5 G/DL (ref 3.5–5)
ALP SERPL-CCNC: 84 U/L (ref 34–104)
ALT SERPL W P-5'-P-CCNC: 39 U/L (ref 7–52)
ANION GAP SERPL CALCULATED.3IONS-SCNC: 9 MMOL/L (ref 4–13)
AST SERPL W P-5'-P-CCNC: 23 U/L (ref 13–39)
ATRIAL RATE: 76 BPM
BASOPHILS # BLD AUTO: 0.03 THOUSANDS/ΜL (ref 0–0.1)
BASOPHILS NFR BLD AUTO: 0 % (ref 0–1)
BILIRUB SERPL-MCNC: 0.49 MG/DL (ref 0.2–1)
BUN SERPL-MCNC: 24 MG/DL (ref 5–25)
CALCIUM SERPL-MCNC: 8.5 MG/DL (ref 8.4–10.2)
CARDIAC TROPONIN I PNL SERPL HS: 24 NG/L
CARDIAC TROPONIN I PNL SERPL HS: 28 NG/L
CHLORIDE SERPL-SCNC: 101 MMOL/L (ref 96–108)
CO2 SERPL-SCNC: 25 MMOL/L (ref 21–32)
CREAT SERPL-MCNC: 0.93 MG/DL (ref 0.6–1.3)
EOSINOPHIL # BLD AUTO: 0.11 THOUSAND/ΜL (ref 0–0.61)
EOSINOPHIL NFR BLD AUTO: 1 % (ref 0–6)
ERYTHROCYTE [DISTWIDTH] IN BLOOD BY AUTOMATED COUNT: 13.2 % (ref 11.6–15.1)
GFR SERPL CREATININE-BSD FRML MDRD: 88 ML/MIN/1.73SQ M
GLUCOSE SERPL-MCNC: 108 MG/DL (ref 65–140)
HCT VFR BLD AUTO: 25.7 % (ref 36.5–49.3)
HGB BLD-MCNC: 8.2 G/DL (ref 12–17)
IMM GRANULOCYTES # BLD AUTO: 0.24 THOUSAND/UL (ref 0–0.2)
IMM GRANULOCYTES NFR BLD AUTO: 2 % (ref 0–2)
LYMPHOCYTES # BLD AUTO: 1.74 THOUSANDS/ΜL (ref 0.6–4.47)
LYMPHOCYTES NFR BLD AUTO: 13 % (ref 14–44)
MCH RBC QN AUTO: 28.8 PG (ref 26.8–34.3)
MCHC RBC AUTO-ENTMCNC: 31.9 G/DL (ref 31.4–37.4)
MCV RBC AUTO: 90 FL (ref 82–98)
MONOCYTES # BLD AUTO: 1.57 THOUSAND/ΜL (ref 0.17–1.22)
MONOCYTES NFR BLD AUTO: 11 % (ref 4–12)
NEUTROPHILS # BLD AUTO: 10.21 THOUSANDS/ΜL (ref 1.85–7.62)
NEUTS SEG NFR BLD AUTO: 73 % (ref 43–75)
NRBC BLD AUTO-RTO: 0 /100 WBCS
P AXIS: 72 DEGREES
PLATELET # BLD AUTO: 399 THOUSANDS/UL (ref 149–390)
PMV BLD AUTO: 8.1 FL (ref 8.9–12.7)
POTASSIUM SERPL-SCNC: 4.3 MMOL/L (ref 3.5–5.3)
PR INTERVAL: 156 MS
PROT SERPL-MCNC: 6.7 G/DL (ref 6.4–8.4)
QRS AXIS: 75 DEGREES
QRSD INTERVAL: 100 MS
QT INTERVAL: 400 MS
QTC INTERVAL: 450 MS
RBC # BLD AUTO: 2.85 MILLION/UL (ref 3.88–5.62)
SODIUM SERPL-SCNC: 135 MMOL/L (ref 135–147)
T WAVE AXIS: 69 DEGREES
VENTRICULAR RATE: 76 BPM
WBC # BLD AUTO: 13.9 THOUSAND/UL (ref 4.31–10.16)

## 2024-09-30 PROCEDURE — 71275 CT ANGIOGRAPHY CHEST: CPT

## 2024-09-30 PROCEDURE — 84484 ASSAY OF TROPONIN QUANT: CPT

## 2024-09-30 PROCEDURE — 93005 ELECTROCARDIOGRAM TRACING: CPT

## 2024-09-30 PROCEDURE — 99284 EMERGENCY DEPT VISIT MOD MDM: CPT

## 2024-09-30 PROCEDURE — 85025 COMPLETE CBC W/AUTO DIFF WBC: CPT

## 2024-09-30 PROCEDURE — 36415 COLL VENOUS BLD VENIPUNCTURE: CPT

## 2024-09-30 PROCEDURE — 74174 CTA ABD&PLVS W/CONTRAST: CPT

## 2024-09-30 PROCEDURE — G0180 MD CERTIFICATION HHA PATIENT: HCPCS | Performed by: THORACIC SURGERY (CARDIOTHORACIC VASCULAR SURGERY)

## 2024-09-30 PROCEDURE — 80053 COMPREHEN METABOLIC PANEL: CPT

## 2024-09-30 PROCEDURE — 93010 ELECTROCARDIOGRAM REPORT: CPT | Performed by: INTERNAL MEDICINE

## 2024-09-30 RX ORDER — SODIUM CHLORIDE 9 MG/ML
3 INJECTION INTRAVENOUS
Status: DISCONTINUED | OUTPATIENT
Start: 2024-09-30 | End: 2024-09-30 | Stop reason: HOSPADM

## 2024-09-30 RX ADMIN — IOHEXOL 100 ML: 350 INJECTION, SOLUTION INTRAVENOUS at 17:07

## 2024-09-30 NOTE — ED PROVIDER NOTES
"Chief Complaint   Patient presents with    Post-op Problem     Aorta repaired last week, states he is having chest pressure mixed with dizziness and SOB. Had a fall as well today, -HS +thinners -LOC     History of Present Illness and Review of Systems   This is a 59 y.o. male with past medical history for aortic dissection with repair last week who comes in with an ambulatory fall down the last 2 stairs in his house.  The patient states that he is not having any chest pain back pain nausea vomiting diarrhea or abdominal pain.  The patient is worried that given his recent surgery that something could have possibly went wrong given his fall.  The patient is vitally stable and in no acute distress at this time    No other complaints for this encounter.    Remainder of ROS Reviewed and Non-Pertinent    Past Medical, Past Surgical History:    has a past medical history of Colon polyps and Hypertension.   has a past surgical history that includes Colonoscopy w/ biopsies and polypectomy and pr as-aort grf w/card byp & aortic root rplcmt (N/A, 9/19/2024).     Allergies:   No Known Allergies    Social and Family History:     Social History     Substance and Sexual Activity   Alcohol Use Yes    Alcohol/week: 3.0 standard drinks of alcohol    Types: 3 Cans of beer per week    Comment: weekly     Social History     Tobacco Use   Smoking Status Former    Average packs/day: 0.5 packs/day for 45.7 years (22.9 ttl pk-yrs)    Types: Cigarettes    Start date: 1979   Smokeless Tobacco Never   Tobacco Comments    daily     Social History     Substance and Sexual Activity   Drug Use Yes    Types: Marijuana       Physical Examination     Vitals:    09/30/24 1547 09/30/24 1600   BP: 146/76 124/62   BP Location:  Right arm   Pulse: 75 74   Resp: 20 18   Temp: 97.7 °F (36.5 °C)    TempSrc: Temporal    SpO2: 99% 99%   Weight: 81.6 kg (180 lb)    Height: 5' 11\" (1.803 m)        Physical Exam  Vitals and nursing note reviewed. "   Constitutional:       General: He is not in acute distress.     Appearance: He is well-developed.   HENT:      Head: Normocephalic and atraumatic.   Eyes:      Conjunctiva/sclera: Conjunctivae normal.   Cardiovascular:      Rate and Rhythm: Normal rate and regular rhythm.      Heart sounds: No murmur heard.  Pulmonary:      Effort: Pulmonary effort is normal. No respiratory distress.      Breath sounds: Normal breath sounds.   Abdominal:      Palpations: Abdomen is soft.      Tenderness: There is no abdominal tenderness.   Musculoskeletal:         General: No swelling.      Cervical back: Neck supple.   Skin:     General: Skin is warm and dry.      Capillary Refill: Capillary refill takes less than 2 seconds.   Neurological:      Mental Status: He is alert.   Psychiatric:         Mood and Affect: Mood normal.           Procedures   Procedures      MDM:   Medical Decision Making  Amount and/or Complexity of Data Reviewed  Labs: ordered.  Radiology: ordered.    Risk  Prescription drug management.      59-year-old male comes in with concern for postop abnormality after having an aortic dissection repair last week.  The patient had an ambulatory fall.    Exam for this patient was nonsignificant with heart lungs that are clear to auscultation abdomen that is soft and nontender.  The patient's surgical incision sites are clean and dry.    Patient's vitals are stable    Plan for this patient to get basic labs and repeat dissection study to make sure that there is no acute abnormalities    The patient was not found to have any acute findings and was discharged after medical clearance.  The patient was told to follow-up with his surgeon and PCP at this time and come back if he has any new concerning symptoms that may arise going forward  ED Course as of 10/04/24 2037   Mon Sep 30, 2024   1814 Postsurgical changes status post thoracic aortic repair with moderate quantity of mediastinal surgical bed fluid and small quantity  of mediastinal gas both likely of a normal quantity status post recent surgery.     No acute injury.           Final Dispo   Final Diagnosis:  1. Fall, initial encounter      Time reflects when diagnosis was documented in both MDM as applicable and the Disposition within this note       Time User Action Codes Description Comment    9/30/2024  6:30 PM Jean Paul Jerry Add [W19.XXXA] Fall, initial encounter           ED Disposition       ED Disposition   Discharge    Condition   Stable    Date/Time   Mon Sep 30, 2024  6:30 PM    Comment   Santos Sandoval discharge to home/self care.                   Follow-up Information    None       Medications   iohexol (OMNIPAQUE) 350 MG/ML injection (MULTI-DOSE) 100 mL (100 mL Intravenous Given 9/30/24 1707)       Risk Stratification Tools                Orders Placed This Encounter   Procedures    CTA chest abdomen pelvis w wo contrast (Dissection)    CBC and differential    HS Troponin 0hr (reflex protocol)    Comprehensive metabolic panel    HS Troponin I 2hr    Continuous cardiac monitoring    Continuous pulse oximetry    ECG 12 lead    ECG 12 lead       Labs:     Labs Reviewed   CBC AND DIFFERENTIAL - Abnormal       Result Value Ref Range Status    WBC 13.90 (*) 4.31 - 10.16 Thousand/uL Final    RBC 2.85 (*) 3.88 - 5.62 Million/uL Final    Hemoglobin 8.2 (*) 12.0 - 17.0 g/dL Final    Hematocrit 25.7 (*) 36.5 - 49.3 % Final    MCV 90  82 - 98 fL Final    MCH 28.8  26.8 - 34.3 pg Final    MCHC 31.9  31.4 - 37.4 g/dL Final    RDW 13.2  11.6 - 15.1 % Final    MPV 8.1 (*) 8.9 - 12.7 fL Final    Platelets 399 (*) 149 - 390 Thousands/uL Final    nRBC 0  /100 WBCs Final    Segmented % 73  43 - 75 % Final    Immature Grans % 2  0 - 2 % Final    Lymphocytes % 13 (*) 14 - 44 % Final    Monocytes % 11  4 - 12 % Final    Eosinophils Relative 1  0 - 6 % Final    Basophils Relative 0  0 - 1 % Final    Absolute Neutrophils 10.21 (*) 1.85 - 7.62 Thousands/µL Final    Absolute Immature Grans  "0.24 (*) 0.00 - 0.20 Thousand/uL Final    Absolute Lymphocytes 1.74  0.60 - 4.47 Thousands/µL Final    Absolute Monocytes 1.57 (*) 0.17 - 1.22 Thousand/µL Final    Eosinophils Absolute 0.11  0.00 - 0.61 Thousand/µL Final    Basophils Absolute 0.03  0.00 - 0.10 Thousands/µL Final   HS TROPONIN I 0HR - Normal    hs TnI 0hr 24  \"Refer to ACS Flowchart\"- see link ng/L Final    Comment:                                              Initial (time 0) result  If >=50 ng/L, Myocardial injury suggested ;  Type of myocardial injury and treatment strategy  to be determined.  If 5-49 ng/L, a delta result at 2 hours and or 4 hours will be needed to further evaluate.  If <4 ng/L, and chest pain has been >3 hours since onset, patient may qualify for discharge based on the HEART score in the ED.  If <5 ng/L and <3hours since onset of chest pain, a delta result at 2 hours will be needed to further evaluate.    HS Troponin 99th Percentile URL of a Health Population=12 ng/L with a 95% Confidence Interval of 8-18 ng/L.    Second Troponin (time 2 hours)  If calculated delta >= 20 ng/L,  Myocardial injury suggested ; Type of myocardial injury and treatment strategy to be determined.  If 5-49 ng/L and the calculated delta is 5-19 ng/L, consult medical service for evaluation.  Continue evaluation for ischemia on ecg and other possible etiology and repeat hs troponin at 4 hours.  If delta is <5 ng/L at 2 hours, consider discharge based on risk stratification via the HEART score (if in ED), or LAILA risk score in IP/Observation.    HS Troponin 99th Percentile URL of a Health Population=12 ng/L with a 95% Confidence Interval of 8-18 ng/L.   HS TROPONIN I 2HR - Normal    hs TnI 2hr 28  \"Refer to ACS Flowchart\"- see link ng/L Final    Comment:                                              Initial (time 0) result  If >=50 ng/L, Myocardial injury suggested ;  Type of myocardial injury and treatment strategy  to be determined.  If 5-49 ng/L, a delta " result at 2 hours and or 4 hours will be needed to further evaluate.  If <4 ng/L, and chest pain has been >3 hours since onset, patient may qualify for discharge based on the HEART score in the ED.  If <5 ng/L and <3hours since onset of chest pain, a delta result at 2 hours will be needed to further evaluate.    HS Troponin 99th Percentile URL of a Health Population=12 ng/L with a 95% Confidence Interval of 8-18 ng/L.    Second Troponin (time 2 hours)  If calculated delta >= 20 ng/L,  Myocardial injury suggested ; Type of myocardial injury and treatment strategy to be determined.  If 5-49 ng/L and the calculated delta is 5-19 ng/L, consult medical service for evaluation.  Continue evaluation for ischemia on ecg and other possible etiology and repeat hs troponin at 4 hours.  If delta is <5 ng/L at 2 hours, consider discharge based on risk stratification via the HEART score (if in ED), or LAILA risk score in IP/Observation.    HS Troponin 99th Percentile URL of a Health Population=12 ng/L with a 95% Confidence Interval of 8-18 ng/L.    Delta 2hr hsTnI 4  <20 ng/L Final   COMPREHENSIVE METABOLIC PANEL    Sodium 135  135 - 147 mmol/L Final    Potassium 4.3  3.5 - 5.3 mmol/L Final    Chloride 101  96 - 108 mmol/L Final    CO2 25  21 - 32 mmol/L Final    ANION GAP 9  4 - 13 mmol/L Final    BUN 24  5 - 25 mg/dL Final    Creatinine 0.93  0.60 - 1.30 mg/dL Final    Comment: Standardized to IDMS reference method    Glucose 108  65 - 140 mg/dL Final    Comment: If the patient is fasting, the ADA then defines impaired fasting glucose as > 100 mg/dL and diabetes as > or equal to 123 mg/dL.    Calcium 8.5  8.4 - 10.2 mg/dL Final    AST 23  13 - 39 U/L Final    ALT 39  7 - 52 U/L Final    Comment: Specimen collection should occur prior to Sulfasalazine administration due to the potential for falsely depressed results.     Alkaline Phosphatase 84  34 - 104 U/L Final    Total Protein 6.7  6.4 - 8.4 g/dL Final    Albumin 3.5  3.5 -  "5.0 g/dL Final    Total Bilirubin 0.49  0.20 - 1.00 mg/dL Final    Comment: Use of this assay is not recommended for patients undergoing treatment with eltrombopag due to the potential for falsely elevated results.  N-acetyl-p-benzoquinone imine (metabolite of Acetaminophen) will generate erroneously low results in samples for patients that have taken an overdose of Acetaminophen.    eGFR 88  ml/min/1.73sq m Final    Narrative:     [employer] guidelines for Chronic Kidney Disease (CKD):     Stage 1 with normal or high GFR (GFR > 90 mL/min/1.73 square meters)    Stage 2 Mild CKD (GFR = 60-89 mL/min/1.73 square meters)    Stage 3A Moderate CKD (GFR = 45-59 mL/min/1.73 square meters)    Stage 3B Moderate CKD (GFR = 30-44 mL/min/1.73 square meters)    Stage 4 Severe CKD (GFR = 15-29 mL/min/1.73 square meters)    Stage 5 End Stage CKD (GFR <15 mL/min/1.73 square meters)  Note: GFR calculation is accurate only with a steady state creatinine       Imaging:     CTA chest abdomen pelvis w wo contrast (Dissection)   Final Result by Jorge Boss MD (09/30 1803)      Postsurgical changes status post thoracic aortic repair with moderate quantity of mediastinal surgical bed fluid and small quantity of mediastinal gas both likely of a normal quantity status post recent surgery.      No acute injury.      The study was marked in EPIC for immediate notification.         Workstation performed: [id number]            All details of the evaluation and treatment plan were made clear and additionally all questions and concerns were addressed while under my care.    Portions of the record may have been created with voice recognition software. Occasional wrong word or \"sound a like\" substitutions may have occurred due to the inherent limitations of voice recognition software. Read the chart carefully and recognize, using context, where substitutions have occurred.    The attending physician physically " available and evaluated the above patient alongside myself.      Jean Paul Jerry MD  10/04/24 2037

## 2024-09-30 NOTE — ED ATTENDING ATTESTATION
9/30/2024  I, Benjamín Jamison MD, saw and evaluated the patient. I have discussed the patient with the resident/non-physician practitioner and agree with the resident's/non-physician practitioner's findings, Plan of Care, and MDM as documented in the resident's/non-physician practitioner's note, except where noted. All available labs and Radiology studies were reviewed.  I was present for key portions of any procedure(s) performed by the resident/non-physician practitioner and I was immediately available to provide assistance.       At this point I agree with the current assessment done in the Emergency Department.  I have conducted an independent evaluation of this patient a history and physical is as follows:    ED Course         Critical Care Time  Procedures    60 yo male with hx of aortic dissection, s/p repair last week, today had a slip and fall down two steps.  Pt with dizziness since going home. No cp, no sob, no headache, no fever, no n/v/d.  No new sob.  Vss, afebrile, lungs cta, rrr, abdomen soft nontender, no neuro deficits.  Vss, afebrile, lungs cta, rrr, abdomen soft nontender, no chest wall tenderness, no neuro deficits.  Labs, EKG, ct chest to rule out post op complication.

## 2024-10-01 ENCOUNTER — OFFICE VISIT (OUTPATIENT)
Dept: INTERNAL MEDICINE CLINIC | Facility: OTHER | Age: 59
End: 2024-10-01
Payer: COMMERCIAL

## 2024-10-01 VITALS
SYSTOLIC BLOOD PRESSURE: 138 MMHG | HEART RATE: 73 BPM | HEIGHT: 71 IN | DIASTOLIC BLOOD PRESSURE: 80 MMHG | OXYGEN SATURATION: 96 % | RESPIRATION RATE: 18 BRPM | WEIGHT: 175.4 LBS | BODY MASS INDEX: 24.56 KG/M2 | TEMPERATURE: 98.1 F

## 2024-10-01 DIAGNOSIS — Z98.890 S/P AORTIC DISSECTION REPAIR: ICD-10-CM

## 2024-10-01 DIAGNOSIS — I10 ESSENTIAL HYPERTENSION: ICD-10-CM

## 2024-10-01 DIAGNOSIS — D62 ACUTE BLOOD LOSS AS CAUSE OF POSTOPERATIVE ANEMIA: ICD-10-CM

## 2024-10-01 DIAGNOSIS — K59.03 OPIOID-INDUCED CONSTIPATION: ICD-10-CM

## 2024-10-01 DIAGNOSIS — I71.010 TYPE 1 DISSECTION OF ASCENDING AORTA (HCC): Primary | ICD-10-CM

## 2024-10-01 DIAGNOSIS — T40.2X5A OPIOID-INDUCED CONSTIPATION: ICD-10-CM

## 2024-10-01 PROBLEM — M62.838 NECK MUSCLE SPASM: Status: RESOLVED | Noted: 2023-06-22 | Resolved: 2024-10-01

## 2024-10-01 PROBLEM — D64.9 ANEMIA: Status: RESOLVED | Noted: 2024-09-22 | Resolved: 2024-10-01

## 2024-10-01 PROBLEM — R00.1 BRADYCARDIA: Status: RESOLVED | Noted: 2024-09-22 | Resolved: 2024-10-01

## 2024-10-01 PROBLEM — E78.5 DYSLIPIDEMIA: Status: ACTIVE | Noted: 2024-10-01

## 2024-10-01 PROBLEM — E87.1 HYPONATREMIA: Status: RESOLVED | Noted: 2024-09-22 | Resolved: 2024-10-01

## 2024-10-01 PROBLEM — D72.829 LEUKOCYTOSIS: Status: RESOLVED | Noted: 2024-09-22 | Resolved: 2024-10-01

## 2024-10-01 PROCEDURE — 99495 TRANSJ CARE MGMT MOD F2F 14D: CPT | Performed by: INTERNAL MEDICINE

## 2024-10-01 RX ORDER — OXYCODONE HYDROCHLORIDE 5 MG/1
5 TABLET ORAL EVERY 6 HOURS PRN
Qty: 21 TABLET | Refills: 0 | Status: SHIPPED | OUTPATIENT
Start: 2024-10-01 | End: 2024-10-10 | Stop reason: SDUPTHER

## 2024-10-01 RX ORDER — QUINIDINE GLUCONATE 324 MG
240 TABLET, EXTENDED RELEASE ORAL
Qty: 90 TABLET | Refills: 0 | Status: SHIPPED | OUTPATIENT
Start: 2024-10-01

## 2024-10-01 RX ORDER — POLYETHYLENE GLYCOL 3350 17 G/17G
17 POWDER, FOR SOLUTION ORAL DAILY PRN
Qty: 30 EACH | Refills: 1 | Status: SHIPPED | OUTPATIENT
Start: 2024-10-01

## 2024-10-01 RX ORDER — DOCUSATE SODIUM 100 MG/1
100 CAPSULE, LIQUID FILLED ORAL 2 TIMES DAILY PRN
Qty: 60 CAPSULE | Refills: 1 | Status: SHIPPED | OUTPATIENT
Start: 2024-10-01

## 2024-10-01 NOTE — PROGRESS NOTES
Cardiology   Hospital Follow Up   Office Visit Note  Santos Sandoval   59 y.o.   male   MRN: [id number]  [facility]  [address]  [address]  [place] [address] [phone]    PCP: Sheldon Moreira MD  Cardiologist: Will be           Summary of Plan:  DASH diet. education provided; 2 L fluid allotment.  Has been drinking less  CBC, BMP, already scheduled in the near future few weeks  Medical adherence reinforced  Repeat an echocardiogram, 4-6 weeks assess for change in LV function  Cessation of tobacco imperative x 5 minutes: He quit 9/19, and was commended  Follow-up with CT surgery Dr. Menchaca  Cardiac rehab has been prescribed and recommended  Follow-up with Dr. Rizvi, at [place] 11/8/2024          Assessment/Plan  Acute type A aortic dissection and underwent S/P ascending aorta and full arch replacement, AV resuspension (Bentall procedure, ascending aortic arch repair with Gelweave graft, total arch replacement, descending aortic repair with a stent, right axillary cannulation, circulatory arrest.  ADM 9/19 - 9/24/2024  Cardiomyopathy ejection fraction 45% with mild global hypokinesis with regional variation.  On metoprolol, valsartan.    Discharge weight 171 pounds  Wt Readings from Last 3 Encounters:   10/03/24 80.2 kg (176 lb 12.8 oz)   10/01/24 79.6 kg (175 lb 6.4 oz)   09/30/24 81.6 kg (180 lb)     Will repeat an echo in 4 to 6 weeks  Mitral regurgitation  Hypertension.  /60 on metoprolol tartrate 100 mg every 12, amlodipine 5/valsartan 160 (in a combination tablet)  Dyslipidemia on atorvastatin 20 mg daily.  He will need reassessment in the future   Latest Reference Range & Units 09/20/24 07:01   Cholesterol See Comment mg/dL 127   Triglycerides See Comment mg/dL 51   HDL >=40 mg/dL 39 (L)   Non-HDL Cholesterol mg/dl 88   LDL Calculated 0 - 100 mg/dL 78   Tobacco abuse, x 40 years.  He quit September 19, 2024.  He was commended.  Type 2 DM   New onset hemoglobin A1c 6.0  Cardiac testing  TTE 9/24/24. EF 45%.  Mild global hypokinesis with regional variation.  Abnormal septal motion consistent with postoperative status.  RV systolic function mildly reduced.  Mild LAE.  Mild to moderate AI.  Mild MR.  Mild to moderate pulmonic valve regurgitation.  Aortic root mildly dilated.  Aortic root is 4.1 cm.STJ is mildly dilated at 4.2 cm.                     ALBERT Sandoval is a 59 y.o.year old with hypertension, and ongoing tobacco use, 40-pack-year history, regular EtOH; no known family history of aneurysms        ADM 9/19 - 9/24/2024.  [facility]--> [facility]  CC: Acute onset chest pain rating to his upper abdomen his upper legs, associated with diaphoresis and shortness of breath.  Notably he was not taking his blood pressure medications  EKG: Dynamic changes  CT chest: Type a dissection down to the iliacs.  Transfer to [facility] urgent line  Dx acute type A aortic dissection and underwent S/P ascending aorta and full arch replacement, AV resuspension  He received several blood products  On the 22nd he was transferred out of intensive care  Discharged on 5 days of torsemide 10 mg plus potassium 10 mEq, metoprolol tartrate 100 mg every 12, aspirin 325 daily, Lipitor 20 daily, amlodipine 5, valsartan 160 daily  Discharge weight:171 lb        ED visit 9/30/2024  Chief complaint: Dizziness  Workup unremarkable      10/3/2024  Hospital follow-up.  He is accompanied by his sister  Intermittent dizziness with position changes.  He has room to hydrate.  We discussed this, 2 L fluid allotment per day.  He is got some incisional discomfort particularly around the chest tube sites.  He is still taking his opioid.  I asked him to start converting over today to plain old acetaminophen, as already ordered.  He quit smoking and was commended.  He lives with a roommate in a double.  He does need to go up steps regularly.  He is doing okay with this.  His incisions are  healing nicely.  No evidence of infection  He completed his diuretic regimen  He is taking his other prescribed medications, as recommended  Blood pressure today 120/60  Weight 176 with clothing, sneakers  On exam he is euvolemic  He has labs scheduled in the near future.  He reports he is kim be starting on iron through his PCP for his anemia  Will obtain an updated echo in 4 to 6 weeks for change in LV function  Cardiac rehab is been prescribed and recommended  He will follow-up in the near future with a cardiologist at the [place] office              Review of Systems   Constitutional: Positive for malaise/fatigue. Negative for chills.   Cardiovascular:  Negative for chest pain, claudication, cyanosis, dyspnea on exertion, irregular heartbeat, leg swelling, near-syncope, orthopnea, palpitations, paroxysmal nocturnal dyspnea and syncope.        Pain at the chest tube site   Respiratory:  Negative for cough and shortness of breath.    Gastrointestinal:  Negative for heartburn and nausea.   Neurological:  Negative for dizziness, focal weakness, headaches, light-headedness and weakness.   All other systems reviewed and are negative.            Assessment  Diagnoses and all orders for this visit:    Type 1 dissection of ascending aorta (HCC)    S/P aortic dissection repair  -     POCT ECG  -     Echo follow up/limited w/ contrast if indicated; Future    Essential hypertension    Mitral valve insufficiency, unspecified etiology    Smoking    Dyslipidemia    Cardiomyopathy, unspecified type (HCC)  -     Echo follow up/limited w/ contrast if indicated; Future        Past Medical History:   Diagnosis Date    Colon polyps     Hypertension        Past Surgical History:   Procedure Laterality Date    COLONOSCOPY W/ BIOPSIES AND POLYPECTOMY      DC AS-AORT GRF W/CARD BYP & AORTIC ROOT RPLCMT N/A 9/19/2024    Procedure: BENTALL PROCEDURE ;ASCENDING AORTIC AND ARCH AORTIC REPAIR W/ 28X10 GELWEAVE GRAFT; TOTAL ARCH  REPLACEMENT W/ 12X8X8 GELWEAVE GRAFT; DESCENDING AORTIC REPAIR W/ [id number] TAG CONFORMABLE STENT; RIGHT AXILLARY CANNULATION; CIRCULATORY ARREST;  Surgeon: Rio Menchaca MD;  Location: [facility];  Service: Cardiac Surgery           Allergies  No Known Allergies      Medications    Current Outpatient Medications:     acetaminophen (TYLENOL) 325 mg tablet, Take 2 tablets (650 mg total) by mouth every 6 (six) hours as needed for mild pain, headaches or fever, Disp: , Rfl:     amLODIPine-valsartan (EXFORGE) 5-160 MG per tablet, Take 1 tablet by mouth daily, Disp: 30 tablet, Rfl: 3    aspirin 325 mg tablet, Take 1 tablet (325 mg total) by mouth daily, Disp: 30 tablet, Rfl: 3    atorvastatin (LIPITOR) 20 mg tablet, Take 1 tablet (20 mg total) by mouth daily with dinner, Disp: 30 tablet, Rfl: 3    docusate sodium (COLACE) 100 mg capsule, Take 1 capsule (100 mg total) by mouth 2 (two) times a day as needed for constipation, Disp: 60 capsule, Rfl: 1    ferrous gluconate (FERGON) 240 (27 FE) MG tablet, Take 1 tablet (240 mg total) by mouth daily with breakfast, Disp: 90 tablet, Rfl: 0    metoprolol tartrate (LOPRESSOR) 100 mg tablet, Take 1 tablet (100 mg total) by mouth every 12 (twelve) hours, Disp: 60 tablet, Rfl: 3    oxyCODONE (ROXICODONE) 5 immediate release tablet, Take 1 tablet (5 mg total) by mouth every 6 (six) hours as needed for moderate pain or severe pain for up to 10 days Max Daily Amount: 20 mg, Disp: 21 tablet, Rfl: 0    pantoprazole (PROTONIX) 40 mg tablet, Take 1 tablet (40 mg total) by mouth daily in the early morning Take one tablet once daily x 5 days then stop, Disp: 30 tablet, Rfl: 0    polyethylene glycol (MIRALAX) 17 g packet, Take 17 g by mouth daily as needed (constipation), Disp: 30 each, Rfl: 1      Social History     Socioeconomic History    Marital status: Unknown     Spouse name: Not on file    Number of children: Not on file    Years of education: Not on file    Highest education level:  Not on file   Occupational History    Not on file   Tobacco Use    Smoking status: Former     Average packs/day: 0.5 packs/day for 45.7 years (22.9 ttl pk-yrs)     Types: Cigarettes     Start date: 1979    Smokeless tobacco: Never    Tobacco comments:     daily   Vaping Use    Vaping status: Never Used   Substance and Sexual Activity    Alcohol use: Yes     Alcohol/week: 3.0 standard drinks of alcohol     Types: 3 Cans of beer per week     Comment: weekly    Drug use: Yes     Types: Marijuana    Sexual activity: Not on file   Other Topics Concern    Not on file   Social History Narrative    Not on file     Social Determinants of Health     Financial Resource Strain: Not on file   Food Insecurity: No Food Insecurity (9/23/2024)    Hunger Vital Sign     Worried About Running Out of Food in the Last Year: Never true     Ran Out of Food in the Last Year: Never true   Transportation Needs: No Transportation Needs (9/23/2024)    PRAPARE - Transportation     Lack of Transportation (Medical): No     Lack of Transportation (Non-Medical): No   Physical Activity: Not on file   Stress: Not on file   Social Connections: Not on file   Intimate Partner Violence: Not on file   Housing Stability: Low Risk  (9/23/2024)    Housing Stability Vital Sign     Unable to Pay for Housing in the Last Year: No     Number of Times Moved in the Last Year: 0     Homeless in the Last Year: No       Family History   Family history unknown: Yes       Physical Exam  Vitals and nursing note reviewed.   Constitutional:       General: He is not in acute distress.  HENT:      Head: Normocephalic and atraumatic.   Eyes:      Extraocular Movements: Extraocular movements intact.      Conjunctiva/sclera: Conjunctivae normal.   Cardiovascular:      Rate and Rhythm: Normal rate and regular rhythm.      Pulses: Intact distal pulses.      Heart sounds: Normal heart sounds.   Pulmonary:      Effort: Pulmonary effort is normal.      Breath sounds: Normal breath  "sounds.   Chest:      Comments: Sternal, right upper chest and chest tube site incisions all clean and dry.  Skin edges well-approximated: no erythema or drainage  Abdominal:      General: Bowel sounds are normal.      Palpations: Abdomen is soft.   Musculoskeletal:         General: Normal range of motion.      Cervical back: Normal range of motion and neck supple.   Skin:     General: Skin is warm and dry.   Neurological:      Mental Status: He is alert and oriented to person, place, and time.   Psychiatric:         Mood and Affect: Mood normal.         Vitals: Blood pressure 120/60, pulse 69, height 5' 11\" (1.803 m), weight 80.2 kg (176 lb 12.8 oz), SpO2 96%.   Wt Readings from Last 3 Encounters:   10/03/24 80.2 kg (176 lb 12.8 oz)   10/01/24 79.6 kg (175 lb 6.4 oz)   09/30/24 81.6 kg (180 lb)           Labs & Results:  Lab Results   Component Value Date    WBC 13.90 (H) 09/30/2024    HGB 8.2 (L) 09/30/2024    HCT 25.7 (L) 09/30/2024    MCV 90 09/30/2024     (H) 09/30/2024     BNP   Date Value Ref Range Status   09/19/2024 49 0 - 100 pg/mL Final     No components found for: \"CHEM\"  No results found for: \"CKTOTAL\", \"TROPONINI\", \"TROPONINT\", \"CKMBINDEX\"  No results found for this or any previous visit.    No results found for this or any previous visit.    No valid procedures specified.  No results found for this or any previous visit.                This note was completed in part utilizing [employer] direct voice recognition software.   Grammatical errors, random word insertion, spelling mistakes, and incomplete sentences may be an occasional consequence of the system secondary to software limitations, ambient noise and hardware issues. At the time of dictation, efforts were made to edit, clarify and /or correct errors.  Please read the chart carefully and recognize, using context, where substitutions have occurred.  If you have any questions or concerns about the context, text or information contained " within the body of this dictation, please contact myself, the provider, for further clarification

## 2024-10-01 NOTE — ASSESSMENT & PLAN NOTE
Status post repair.  He has follow-up scheduled with cardiology and cardiothoracic surgery.  Continue current medication regimen.    Orders:    CBC; Future    Basic metabolic panel; Future

## 2024-10-01 NOTE — PROGRESS NOTES
Transition of Care Visit  Name: Santos Sandoval      : 1965      MRN: [id number]  Encounter Provider: Sheldon Moreira MD  Encounter Date: 10/1/2024   Encounter department: [facility] [place]    Assessment & Plan  Type 1 dissection of ascending aorta (HCC)  Status post repair.  He has follow-up scheduled with cardiology and cardiothoracic surgery.  Continue current medication regimen.    Orders:    CBC; Future    Basic metabolic panel; Future    Acute blood loss as cause of postoperative anemia  Recommend he start iron supplementation.  Will recheck CBC in 1 month.    Orders:    CBC; Future    Basic metabolic panel; Future    ferrous gluconate (FERGON) 240 (27 FE) MG tablet; Take 1 tablet (240 mg total) by mouth daily with breakfast    S/P aortic dissection repair    Orders:    oxyCODONE (ROXICODONE) 5 immediate release tablet; Take 1 tablet (5 mg total) by mouth every 6 (six) hours as needed for moderate pain or severe pain for up to 10 days Max Daily Amount: 20 mg    polyethylene glycol (MIRALAX) 17 g packet; Take 17 g by mouth daily as needed (constipation)    docusate sodium (COLACE) 100 mg capsule; Take 1 capsule (100 mg total) by mouth 2 (two) times a day as needed for constipation    Essential hypertension  Continue current antihypertensive regimen.           Opioid-induced constipation  Recommended he start colace and miralax.               History of Present Illness     Transitional Care Management Review:   Santos Sandoval is a 59 y.o. male here for TCM follow up.     During the TCM phone call patient stated:  TCM Call       Date and time call was made  2024  3:08 PM    Hospital care reviewed  Records reviewed    Patient was hospitialized at  [facility]    Date of Admission  24    Date of discharge  24    Diagnosis  type ! dissection of ascending aorta    Disposition  Home    Current Symptoms  Cough; Fatigue; Dizziness          TCM Call        Post hospital issues  Reduced activity; Poor ADL (Activities of Daily Living) performance    Scheduled for follow up?  Yes    Did you obtain your prescribed medications  Yes    Do you need help managing your prescriptions or medications  No    Is transportation to your appointment needed  No    I have advised the patient to call PCP with any new or worsening symptoms  Rick Sharif MAE          Santos Sandoval is seen today for transition of care.  Hospitalization and discharge summary reviewed today.  He presented to [facility] with severe chest pain radiating down to his abdomen and legs.  He was found to have a type a dissection on CTA.  He was emergently transferred to [facility] to which he underwent ascending aorta and full arch replacement/frozen elephant trunk with endovascular graft/resuspension of the aortic valve.  He required platelet, FFP, and cryo transfusions during procedure.  He was transferred to the ICU for close monitoring following procedure.  He was on pressors following procedure which were weaned off.  He was extubated without complications.  Lopressor was increased to 100 mg twice daily, ACE inhibitor was discontinued and started on valsartan .  Norvasc 5 mg daily was started.   He has follow-up scheduled with cardiology and cardiothoracic surgery.      He went to the emergency department yesterday after he sustained a fall.  Troponins were negative.  Hemoglobin was stable at 8.2.  CMP is within acceptable range.  He continues to have mild leukocytosis.  He denies any infectious symptoms.      Constipation  Pertinent negatives include no abdominal pain, diarrhea, difficulty urinating, fever, nausea or vomiting.     Review of Systems   Constitutional:  Negative for activity change, appetite change, chills, diaphoresis, fatigue and fever.   HENT:  Negative for congestion, postnasal drip, rhinorrhea, sinus pressure, sinus pain, sneezing and sore throat.    Eyes:   "Negative for visual disturbance.   Respiratory:  Negative for apnea, cough, choking, chest tightness, shortness of breath and wheezing.    Cardiovascular:  Negative for chest pain, palpitations and leg swelling.   Gastrointestinal:  Positive for constipation. Negative for abdominal distention, abdominal pain, anal bleeding, blood in stool, diarrhea, nausea and vomiting.   Endocrine: Negative for cold intolerance and heat intolerance.   Genitourinary:  Negative for difficulty urinating, dysuria and hematuria.   Musculoskeletal: Negative.    Skin: Negative.    Neurological:  Negative for dizziness, weakness, light-headedness, numbness and headaches.   Hematological:  Negative for adenopathy.   Psychiatric/Behavioral:  Negative for agitation, sleep disturbance and suicidal ideas.    All other systems reviewed and are negative.    Objective     /80 (BP Location: Left arm, Patient Position: Sitting, Cuff Size: Standard)   Pulse 73   Temp 98.1 °F (36.7 °C) (Temporal)   Resp 18   Ht 5' 11\" (1.803 m)   Wt 79.6 kg (175 lb 6.4 oz)   SpO2 96%   BMI 24.46 kg/m²     Physical Exam  Vitals and nursing note reviewed.   Constitutional:       General: He is not in acute distress.     Appearance: He is well-developed. He is not diaphoretic.   HENT:      Head: Normocephalic and atraumatic.   Eyes:      General: No scleral icterus.        Right eye: No discharge.         Left eye: No discharge.      Conjunctiva/sclera: Conjunctivae normal.      Pupils: Pupils are equal, round, and reactive to light.   Neck:      Thyroid: No thyromegaly.      Vascular: No JVD.   Cardiovascular:      Rate and Rhythm: Normal rate and regular rhythm.      Heart sounds: Normal heart sounds. No murmur heard.     No friction rub. No gallop.   Pulmonary:      Effort: Pulmonary effort is normal. No respiratory distress.      Breath sounds: Normal breath sounds. No wheezing or rales.   Chest:      Chest wall: No tenderness.   Abdominal:      General: " Bowel sounds are normal. There is no distension.      Palpations: Abdomen is soft. There is no mass.      Tenderness: There is no abdominal tenderness. There is no guarding or rebound.   Musculoskeletal:         General: No tenderness or deformity. Normal range of motion.      Cervical back: Normal range of motion and neck supple.   Lymphadenopathy:      Cervical: No cervical adenopathy.   Skin:     General: Skin is warm and dry.      Coloration: Skin is not pale.      Findings: No erythema or rash.          Neurological:      Mental Status: He is alert and oriented to person, place, and time.      Cranial Nerves: No cranial nerve deficit.      Coordination: Coordination normal.      Deep Tendon Reflexes: Reflexes are normal and symmetric.   Psychiatric:         Behavior: Behavior normal.         Thought Content: Thought content normal.         Judgment: Judgment normal.       Medications have been reviewed by provider in current encounter    Administrative Statements   I have spent a total time of 30 minutes in caring for this patient on the day of the visit/encounter including Diagnostic results, Prognosis, Risks and benefits of tx options, Instructions for management, Patient and family education, Importance of tx compliance, Risk factor reductions, Impressions, Counseling / Coordination of care, Documenting in the medical record, Reviewing / ordering tests, medicine, procedures  , and Obtaining or reviewing history  .

## 2024-10-01 NOTE — ASSESSMENT & PLAN NOTE
Recommend he start iron supplementation.  Will recheck CBC in 1 month.    Orders:    CBC; Future    Basic metabolic panel; Future    ferrous gluconate (FERGON) 240 (27 FE) MG tablet; Take 1 tablet (240 mg total) by mouth daily with breakfast

## 2024-10-01 NOTE — ASSESSMENT & PLAN NOTE
Orders:    oxyCODONE (ROXICODONE) 5 immediate release tablet; Take 1 tablet (5 mg total) by mouth every 6 (six) hours as needed for moderate pain or severe pain for up to 10 days Max Daily Amount: 20 mg    polyethylene glycol (MIRALAX) 17 g packet; Take 17 g by mouth daily as needed (constipation)    docusate sodium (COLACE) 100 mg capsule; Take 1 capsule (100 mg total) by mouth 2 (two) times a day as needed for constipation

## 2024-10-02 ENCOUNTER — TELEPHONE (OUTPATIENT)
Dept: CARDIAC SURGERY | Facility: CLINIC | Age: 59
End: 2024-10-02

## 2024-10-02 ENCOUNTER — HOME CARE VISIT (OUTPATIENT)
Dept: HOME HEALTH SERVICES | Facility: HOME HEALTHCARE | Age: 59
End: 2024-10-02
Payer: COMMERCIAL

## 2024-10-02 ENCOUNTER — TELEPHONE (OUTPATIENT)
Dept: HOME HEALTH SERVICES | Facility: HOME HEALTHCARE | Age: 59
End: 2024-10-02

## 2024-10-02 NOTE — TELEPHONE ENCOUNTER
Called patient to perform routine post op follow-up after hospital discharge s/p emergent Type A aortic dissection repair.  No answer, left voicemail to call if questions or concerns, otherwise, post op appt with surgeon on 10/24/24.

## 2024-10-03 ENCOUNTER — OFFICE VISIT (OUTPATIENT)
Dept: CARDIOLOGY CLINIC | Facility: CLINIC | Age: 59
End: 2024-10-03
Payer: COMMERCIAL

## 2024-10-03 ENCOUNTER — TELEPHONE (OUTPATIENT)
Dept: HOME HEALTH SERVICES | Facility: HOME HEALTHCARE | Age: 59
End: 2024-10-03

## 2024-10-03 VITALS
OXYGEN SATURATION: 96 % | SYSTOLIC BLOOD PRESSURE: 120 MMHG | HEIGHT: 71 IN | BODY MASS INDEX: 24.75 KG/M2 | WEIGHT: 176.8 LBS | DIASTOLIC BLOOD PRESSURE: 60 MMHG | HEART RATE: 69 BPM

## 2024-10-03 DIAGNOSIS — F17.200 SMOKING: ICD-10-CM

## 2024-10-03 DIAGNOSIS — Z98.890 S/P AORTIC DISSECTION REPAIR: ICD-10-CM

## 2024-10-03 DIAGNOSIS — I42.9 CARDIOMYOPATHY, UNSPECIFIED TYPE (HCC): ICD-10-CM

## 2024-10-03 DIAGNOSIS — I71.010 TYPE 1 DISSECTION OF ASCENDING AORTA (HCC): Primary | ICD-10-CM

## 2024-10-03 DIAGNOSIS — E78.5 DYSLIPIDEMIA: ICD-10-CM

## 2024-10-03 DIAGNOSIS — I34.0 MITRAL VALVE INSUFFICIENCY, UNSPECIFIED ETIOLOGY: ICD-10-CM

## 2024-10-03 DIAGNOSIS — I10 ESSENTIAL HYPERTENSION: ICD-10-CM

## 2024-10-03 PROCEDURE — 99214 OFFICE O/P EST MOD 30 MIN: CPT | Performed by: NURSE PRACTITIONER

## 2024-10-03 PROCEDURE — 93000 ELECTROCARDIOGRAM COMPLETE: CPT | Performed by: NURSE PRACTITIONER

## 2024-10-03 NOTE — PATIENT INSTRUCTIONS
"Patient Education     DASH diet   The Basics   Written by the doctors and editors at [facility]   What is the DASH diet? -- DASH stands for \"dietary approaches to stop hypertension.\" It is an eating plan that can help lower blood pressure. It can also help prevent high blood pressure, which doctors call \"hypertension.\" You don't need special foods or recipes to follow the DASH diet. It is more about eating certain types of foods in certain amounts.  The DASH diet has lots of fruits and vegetables, whole grains, lean meats, healthy fats, and low-fat or fat-free dairy products (figure 1). It is low in saturated fats, trans fats, cholesterol, added sugars, and sodium (salt).  The standard DASH diet limits sodium to no more than 2300 mg a day. Your doctor or nurse can talk to you about what your specific goals should be.  Why do I need the DASH diet? -- The DASH diet can help you:   Lower your blood pressure and cholesterol   Lower your risk for cancer, heart disease, heart attack, and stroke. It might also lower your risk for heart failure, kidney stones, and diabetes.   Lose weight or keep a healthy weight  What can I eat and drink on the DASH diet? -- Below are some guidelines and examples for your daily and weekly nutrition goals. These are based on a 2000-calorie-per-day eating plan.  Daily goals:   Grains - Try to eat 6 to 8 servings of whole-grain, high-fiber foods each day. Examples of a serving include 1 slice of bread, 1 ounce (30 grams) of dry cereal, or 1/2 cup (120 grams) of cooked cereal, pasta, or brown rice.   Fruits - Try to eat 4 to 5 servings of fruit each day. Examples of a serving include 1 medium fruit or 1/2 cup (75 grams) of fresh, frozen, or canned fruit. Try to eat different kinds and colors. Frozen or canned fruit should not have added sugar. Look for frozen or canned fruits with 100 percent fruit juice or water.   Vegetables - Try to eat 4 to 5 servings of vegetables each day. Examples of a " "serving include 1 cup (40 grams) of leafy greens or 1/2 cup (75 grams) of fresh or cooked vegetables. Try to pick many kinds and colors. If you buy canned vegetables, look for \"low sodium\" or \"salt free.\" Buy plain, frozen vegetables to avoid added fat and sodium.   Dairy - Try to eat 2 to 3 servings of fat-free or low-fat milk products each day. Examples of a serving include 1 cup (240 mL) of milk or yogurt or 1.5 ounces (45 grams) of cheese.   Lean meats, poultry, and seafood - Try to eat 6 or fewer servings of lean meat, poultry, and seafood each day. Examples of a serving include 1 egg or 1 ounce (30 grams) of cooked meat, poultry, or fish. Try to choose more low-fat or lean meats like chicken, fish, or turkey. Eat less red meat.   Fats and oils - Try to eat 2 to 3 servings of fats and oils each day. Examples of a serving include 1 teaspoon (5 mL) of soft margarine or vegetable oil, or 1 tablespoon (18 grams) of mayonnaise. Eat healthy fats like those found in fish, nuts, and avocados. Try using olive oil or vegetable oils such as canola oil. You can also try corn, safflower, sunflower, or soybean oils. Use low-sodium and low-fat salad dressing and mayonnaise.  Weekly goals:   Nuts, seeds, and legumes (dry beans and peas) - Try to eat 4 to 5 servings each week. Examples of a serving include 1/3 cup (45 grams) of nuts, 2 tablespoons (50 grams) of nut butter or seeds, or 1/2 cup (75 grams) of cooked legumes. Try almonds and walnuts, sunflower seeds, peanut or other nut butters, soybeans, lentils, kidney beans, and split peas.   Sweets - Try to eat fewer than 5 servings each week. Examples of a serving include 1 tablespoon (14 grams) of sugar or jelly, or 1/2 cup (120 grams) of gelatin. Choose low-fat and trans fat-free desserts. These include fruit-flavored gelatin, sorbet, jellybeans, elzbieta crackers, animal crackers, low-fat fig bars, and sharad snaps. Eat fruit to satisfy the desire for sweets.  To add flavor, " use pepper, herbs, spices, vinegar, or lemon or lime juices. Choose low-sodium or salt-free products whenever you can. This is especially important for foods like broths, soups, or soy sauce.  What foods and drinks should I avoid on the DASH diet?    Grains to avoid - Salted breads, rolls, crackers, quick breads, self-rising flours, biscuit mixes, regular breadcrumbs, instant hot cereals, commercially prepared rice, pasta, stuffing mixes.   Fruits and vegetables to avoid - Store-bought prepared potatoes and vegetable mixes, regular canned vegetables and juices, vegetables frozen with sauce, pickled vegetables, processed fruits with salt or sodium.   Dairy products to avoid - Whole milk, malted milk, chocolate milk, buttermilk, full-fat cheese, ice cream.   Meats to avoid - Smoked, cured, salted, or canned fish such as sardines or anchovies. High-fat cuts of meat like beef, lamb, pork, golden and sausage, and chicken with the skin on it.   Fats and oils to avoid - Eat fewer solid fats like butter, lard, and hard stick margarine. Eat less saturated fat, trans fat, and total fat.   Condiments and snacks to avoid - Salted and canned peas, beans, and olives. Salted snack foods, fried foods, soda, other sweetened drinks.   Sweets to avoid - High-fat baked goods such as muffins, donuts, pastries, and commercial baked goods. Candy bars.   Alcohol - If you choose to drink alcohol, limit the amount. Most doctors recommend limiting alcohol to no more than 1 drink a day (for females) or 2 drinks a day (for males).  What else do I need to know?    Get regular physical activity to make this diet help you even more. Even gentle forms of activity, like walking, are good for your health.   Try baking or broiling instead of frying foods.   Write down the foods that you eat. This will help you track what you have eaten each week.   When you go to the grocery store, have a list or a meal plan. Don't shop when you are hungry, since this  might lead you to buy more unhealthy foods.   Read food labels with care (figure 2). They show you how much is in a serving. The amount is given as a percentage of the total amount that you need each day. Reading labels helps you make healthy food choices.  All topics are updated as new evidence becomes available and our peer review process is complete.  This topic retrieved from [employer] on: Feb 26, 2024.  Topic 762254 Version 1.0  Release: 32.2.4 - C32.56  © 2024 UpToDate, Inc. and/or its affiliates. All rights reserved.  figure 1: DASH diet     Graphic 311649 Version 1.0  figure 2: Food label     Graphic 443122 Version 1.0  Consumer Information Use and Disclaimer   Disclaimer: This generalized information is a limited summary of diagnosis, treatment, and/or medication information. It is not meant to be comprehensive and should be used as a tool to help the user understand and/or assess potential diagnostic and treatment options. It does NOT include all information about conditions, treatments, medications, side effects, or risks that may apply to a specific patient. It is not intended to be medical advice or a substitute for the medical advice, diagnosis, or treatment of a health care provider based on the health care provider's examination and assessment of a patient's specific and unique circumstances. Patients must speak with a health care provider for complete information about their health, medical questions, and treatment options, including any risks or benefits regarding use of medications. This information does not endorse any treatments or medications as safe, effective, or approved for treating a specific patient. UpToDate, Inc. and its affiliates disclaim any warranty or liability relating to this information or the use thereof.The use of this information is governed by the Terms of Use, available at https://www.wolters[employer]Nov[employer].com/en/know/clinical-effectiveness-terms. 2024© UpToDate, Inc. and its  "affiliates and/or licensors. All rights reserved.  Copyright   © 2024 UpToDate, Inc. and/or its affiliates. All rights reserved.  Patient Education     Quitting smoking   The Basics   Written by the doctors and editors at [employer]   What are the benefits of quitting smoking? -- Quitting smoking can dramatically improve your health and help you live longer. It lowers your risk of heart disease, lung disease, kidney failure, cancer, infection, stomach problems, and diabetes.  Quitting smoking can also lower your chances of getting osteoporosis, a condition that makes your bones weak.  Quitting is not easy for most people, and it might take several tries to completely quit. But help and support are available. Quitting smoking will improve your health no matter how old you are, even if you have smoked for a long time.  What should I do if I want to quit smoking? -- It's a good idea to start by talking with your doctor or nurse. It is possible to quit on your own, without help. But getting help greatly increases your chances of quitting successfully.  When you are ready to quit, you will make a plan to:   Set a quit date.   Tell your family and friends that you plan to quit.   Plan ahead for the challenges you will face, such as cigarette cravings.   Remove cigarettes from your home, car, and work.  How can my doctor or nurse help? -- Your doctor or nurse can give you advice on the best way to quit. They can also give you medicines to:   Reduce your craving for cigarettes   Reduce your \"withdrawal\" symptoms (symptoms that happen when you stop smoking)  Your doctor or nurse can also help you find a counselor to talk to. For most people who are trying to quit smoking, it works best to use both medicines and counseling.  You can also get help from a free phone line (2-918-QUIT-NOW, or 1-752.543.2542) or go online to www.smokefree.gov.  What are the symptoms of withdrawal? -- When you stop smoking, you might have symptoms " such as:   Trouble sleeping   Feeling irritable, anxious, or restless   Getting frustrated or angry   Having trouble thinking clearly  These symptoms can be hard to deal with, which is why it can be so hard to quit. But medicines can help.  Some people who stop smoking become temporarily depressed. Some people need treatment for depression, such as counseling or medicines or both. People with depression might:   No longer enjoy or care about doing the things they used to like to do   Feel sad, down, hopeless, nervous, or cranky most of the day, almost every day   Lose or gain weight   Sleep too much or too little   Feel tired or like they have no energy   Feel guilty or like they are worth nothing   Forget things or feel confused   Move and speak more slowly than usual   Act restless or have trouble staying still   Think about death or suicide  If you think you might be depressed, tell your doctor or nurse right away. They can talk to you about your symptoms and recommend treatment if needed.  Get help right away if you are thinking of hurting or killing yourself! -- Sometimes, people with depression think of hurting or killing themselves. If you ever feel like you might hurt yourself, help is available:   In the [place], contact the [facility] Suicide & Crisis Lifeline:   To speak to someone, call or text [facility].   To talk to someone online, go to www.[employer].org/chat.   Call your doctor or nurse, and tell them that it is an emergency.   Call for an ambulance (in the [place] and [place], call 9-1-1).   Go to the emergency department at your local hospital.  If you think your partner might have depression, or if you are worried that they might hurt themselves, get them help right away.  How does counseling work? -- A counselor can help you figure out:   What triggers you to want to smoke, and how to handle these situations   How to resist cravings   What you can do differently if you have tried to quit before  You can meet with a  counselor in 1-on-1 sessions or as part of a group. There are other ways to get counseling, too, such as over the phone, through text messaging, or online.  How do medicines help you stop smoking? -- Different medicines work in different ways:   Nicotine replacement therapy - Nicotine is the main drug in cigarettes, and the reason they are addictive. These medicines reduce your body's craving for nicotine. They also help with withdrawal symptoms.  There are different forms of nicotine replacement, including skin patches, lozenges, gum, nasal sprays, and inhalers. Most can be bought without a prescription. Also, health insurance might cover some or all of the cost.  It often helps to use 2 forms of nicotine replacement. For example, you might wear a patch all the time, plus use gum or lozenges when you get a craving to smoke.   Varenicline - Varenicline (brand names: Chantix, Champix) is a prescription medicine that reduces withdrawal symptoms and cigarette cravings. Varenicline can increase the effects of alcohol in some people. It's a good idea to limit drinking while you're taking it, at least until you know how it affects you.  Even if you are not yet ready to commit to a quit date, varenicline can help reduce cravings. This can make it easier to quit when you are ready.   Bupropion - Bupropion (sample brand names: Wellbutrin, Zyban) is a prescription medicine that reduces your desire to smoke. It is also available in a generic version, which is cheaper than the brand name medicines. Doctors do not usually prescribe bupropion for people with seizures or who have had seizures in the past.  It might also be helpful to combine nicotine replacement with bupropion or varenicline. In some cases, a person might even take both bupropion and varenicline. Your doctor or nurse can help you figure out the best combination for you.  Can vaping help me quit? -- Sometimes, people wonder if vaping can help them quit smoking.  "Vaping is using electronic cigarettes, or \"e-cigarettes.\"  Doctors recommend using medicines and counseling to quit smoking. These methods have been studied the most. But for people who have tried these and not been able to quit, switching to vaping might be an option. There are some things to remember:   Vaping might be less harmful than smoking regular cigarettes. But e-cigarettes still contain nicotine as well as other substances that might be harmful.   If you decide to try vaping to help you quit, it's important to switch completely from regular cigarettes to e-cigarettes. Using both will probably not be helpful, and might increase the risks of harm.   It's not clear how vaping can affect a person's health in the long term.  For these reasons, doctors recommend that if you do try vaping to help you quit smoking, you still make a plan to quit vaping eventually.  If you are interested in trying vaping to help you quit smoking, it's a good idea to talk to your doctor or nurse first.  What if I am pregnant and I smoke? -- If you are pregnant, it's really important for the health of your baby that you quit. Ask your doctor what options you have, and what is safest for your baby.  What if I have already smoked for a long time? -- The longer you have smoked, the higher your chances are of having health problems. But it is never too late to quit smoking. It helps your health even if you are older or have smoked for many years. The best thing you can do to lower your chance of having a health problem caused by smoking is to quit.  Will I gain weight if I quit? -- You might gain a few pounds. This can be frustrating for some people. But it's important to remember that you are improving your health by quitting smoking. You can help prevent gaining a lot of weight by staying active and eating a healthy diet.  What if I am not able to quit? -- If you don't quit on your first try, or if you quit but then start smoking again, " don't give up hope. Lots of people have to try more than once before they are able to completely quit.  It might help to try to understand why quitting did not work. There might be something you can do differently when you try again. It can help to figure out which situations make you want to smoke, so you can avoid them.  What else can I do to improve my chances of quitting? -- You can:   Get regular exercise. Any type of physical activity, even gentle forms of movement, is good for your health. Physical activity can also help reduce stress.   Stay away from people who smoke and places that make you want to smoke. If people close to you smoke, ask them to quit with you or avoid smoking around you.   Carry gum, hard candy, or something to put in your mouth. If you get a craving for a cigarette, try 1 of these instead.   Don't give up, even if you start smoking again. It takes most people a few tries before they succeed.  All topics are updated as new evidence becomes available and our peer review process is complete.  This topic retrieved from [employer] on: Mar 14, 2024.  Topic 30257 Version 33.0  Release: 32.2.4 - C32.72  © 2024 UpToDate, Inc. and/or its affiliates. All rights reserved.  Consumer Information Use and Disclaimer   Disclaimer: This generalized information is a limited summary of diagnosis, treatment, and/or medication information. It is not meant to be comprehensive and should be used as a tool to help the user understand and/or assess potential diagnostic and treatment options. It does NOT include all information about conditions, treatments, medications, side effects, or risks that may apply to a specific patient. It is not intended to be medical advice or a substitute for the medical advice, diagnosis, or treatment of a health care provider based on the health care provider's examination and assessment of a patient's specific and unique circumstances. Patients must speak with a health care provider  for complete information about their health, medical questions, and treatment options, including any risks or benefits regarding use of medications. This information does not endorse any treatments or medications as safe, effective, or approved for treating a specific patient. UpToDate, Inc. and its affiliates disclaim any warranty or liability relating to this information or the use thereof.The use of this information is governed by the Terms of Use, available at https://www.woltersNora Therapeuticsuwer.com/en/know/clinical-effectiveness-terms. 2024© UpToDate, Inc. and its affiliates and/or licensors. All rights reserved.  Copyright   © 2024 UpToDate, Inc. and/or its affiliates. All rights reserved.

## 2024-10-03 NOTE — LETTER
October 3, 2024     Sheldon Moreira MD  [address]  [address]  [facility] [address]    Patient: Santos Sandoval   YOB: 1965   Date of Visit: 10/3/2024       Dear Dr. Moreira:    Thank you for referring Santos Sandoval to me for evaluation. Below are my notes for this consultation.    If you have questions, please do not hesitate to call me. I look forward to following your patient along with you.         Sincerely,        EDUARDO Terry        CC: No Recipients    EDUARDO Terry  10/3/2024 11:48 AM  Sign when Signing Visit  Cardiology   Hospital Follow Up   Office Visit Note  Santos Sandoval   59 y.o.   male   MRN: [id number]  [facility] [place]  [address]  [address]  [facility] [address]  [phone]    PCP: Sheldon Moreira MD  Cardiologist: Will be           Summary of Plan:  DASH diet. education provided; 2 L fluid allotment.  Has been drinking less  CBC, BMP, already scheduled in the near future few weeks  Medical adherence reinforced  Repeat an echocardiogram, 4-6 weeks assess for change in LV function  Cessation of tobacco imperative x 5 minutes: He quit 9/19, and was commended  Follow-up with CT surgery Dr. Menchaca  Cardiac rehab has been prescribed and recommended  Follow-up with Dr. Rizvi, at [facility] 11/8/2024          Assessment/Plan  Acute type A aortic dissection and underwent S/P ascending aorta and full arch replacement, AV resuspension (Bentall procedure, ascending aortic arch repair with Gelweave graft, total arch replacement, descending aortic repair with a stent, right axillary cannulation, circulatory arrest.  ADM 9/19 - 9/24/2024  Cardiomyopathy ejection fraction 45% with mild global hypokinesis with regional variation.  On metoprolol, valsartan.    Discharge weight 171 pounds  Wt Readings from Last 3 Encounters:   10/03/24 80.2 kg (176 lb 12.8 oz)   10/01/24 79.6 kg (175 lb 6.4 oz)   09/30/24 81.6 kg (180 lb)      Will repeat an echo in 4 to 6 weeks  Mitral regurgitation  Hypertension.  /60 on metoprolol tartrate 100 mg every 12, amlodipine 5/valsartan 160 (in a combination tablet)  Dyslipidemia on atorvastatin 20 mg daily.  He will need reassessment in the future   Latest Reference Range & Units 09/20/24 07:01   Cholesterol See Comment mg/dL 127   Triglycerides See Comment mg/dL 51   HDL >=40 mg/dL 39 (L)   Non-HDL Cholesterol mg/dl 88   LDL Calculated 0 - 100 mg/dL 78   Tobacco abuse, x 40 years.  He quit September 19, 2024.  He was commended.  Type 2 DM  New onset hemoglobin A1c 6.0  Cardiac testing  TTE 9/24/24. EF 45%.  Mild global hypokinesis with regional variation.  Abnormal septal motion consistent with postoperative status.  RV systolic function mildly reduced.  Mild LAE.  Mild to moderate AI.  Mild MR.  Mild to moderate pulmonic valve regurgitation.  Aortic root mildly dilated.  Aortic root is 4.1 cm.STJ is mildly dilated at 4.2 cm.                     ALBERT Sandoval is a 59 y.o.year old with hypertension, and ongoing tobacco use, 40-pack-year history, regular EtOH; no known family history of aneurysms        ADM 9/19 - 9/24/2024.  [facility]  CC: Acute onset chest pain rating to his upper abdomen his upper legs, associated with diaphoresis and shortness of breath.  Notably he was not taking his blood pressure medications  EKG: Dynamic changes  CT chest: Type a dissection down to the iliacs.  Transfer to [facility] urgent line  Dx acute type A aortic dissection and underwent S/P ascending aorta and full arch replacement, AV resuspension  He received several blood products  On the 22nd he was transferred out of intensive care  Discharged on 5 days of torsemide 10 mg plus potassium 10 mEq, metoprolol tartrate 100 mg every 12, aspirin 325 daily, Lipitor 20 daily, amlodipine 5, valsartan 160 daily  Discharge weight:171 lb        ED visit 9/30/2024  Chief complaint: Dizziness  Workup  unremarkable      10/3/2024  Hospital follow-up.  He is accompanied by his sister  Intermittent dizziness with position changes.  He has room to hydrate.  We discussed this, 2 L fluid allotment per day.  He is got some incisional discomfort particularly around the chest tube sites.  He is still taking his opioid.  I asked him to start converting over today to plain old acetaminophen, as already ordered.  He quit smoking and was commended.  He lives with a roommate in a double.  He does need to go up steps regularly.  He is doing okay with this.  His incisions are healing nicely.  No evidence of infection  He completed his diuretic regimen  He is taking his other prescribed medications, as recommended  Blood pressure today 120/60  Weight 176 with clothing, sneakers  On exam he is euvolemic  He has labs scheduled in the near future.  He reports he is kim be starting on iron through his PCP for his anemia  Will obtain an updated echo in 4 to 6 weeks for change in LV function  Cardiac rehab is been prescribed and recommended  He will follow-up in the near future with a cardiologist at the [place] office              Review of Systems   Constitutional: Positive for malaise/fatigue. Negative for chills.   Cardiovascular:  Negative for chest pain, claudication, cyanosis, dyspnea on exertion, irregular heartbeat, leg swelling, near-syncope, orthopnea, palpitations, paroxysmal nocturnal dyspnea and syncope.        Pain at the chest tube site   Respiratory:  Negative for cough and shortness of breath.    Gastrointestinal:  Negative for heartburn and nausea.   Neurological:  Negative for dizziness, focal weakness, headaches, light-headedness and weakness.   All other systems reviewed and are negative.            Assessment  Diagnoses and all orders for this visit:    Type 1 dissection of ascending aorta (HCC)    S/P aortic dissection repair  -     POCT ECG  -     Echo follow up/limited w/ contrast if indicated;  Future    Essential hypertension    Mitral valve insufficiency, unspecified etiology    Smoking    Dyslipidemia    Cardiomyopathy, unspecified type (HCC)  -     Echo follow up/limited w/ contrast if indicated; Future        Past Medical History:   Diagnosis Date   • Colon polyps    • Hypertension        Past Surgical History:   Procedure Laterality Date   • COLONOSCOPY W/ BIOPSIES AND POLYPECTOMY     • PA AS-AORT GRF W/CARD BYP & AORTIC ROOT RPLCMT N/A 9/19/2024    Procedure: BENTALL PROCEDURE ;ASCENDING AORTIC AND ARCH AORTIC REPAIR W/ 28X10 GELWEAVE GRAFT; TOTAL ARCH REPLACEMENT W/ 12X8X8 GELWEAVE GRAFT; DESCENDING AORTIC REPAIR W/ [id number] TAG CONFORMABLE STENT; RIGHT AXILLARY CANNULATION; CIRCULATORY ARREST;  Surgeon: Rio Menchaca MD;  Location: [facility];  Service: Cardiac Surgery           Allergies  No Known Allergies      Medications    Current Outpatient Medications:   •  acetaminophen (TYLENOL) 325 mg tablet, Take 2 tablets (650 mg total) by mouth every 6 (six) hours as needed for mild pain, headaches or fever, Disp: , Rfl:   •  amLODIPine-valsartan (EXFORGE) 5-160 MG per tablet, Take 1 tablet by mouth daily, Disp: 30 tablet, Rfl: 3  •  aspirin 325 mg tablet, Take 1 tablet (325 mg total) by mouth daily, Disp: 30 tablet, Rfl: 3  •  atorvastatin (LIPITOR) 20 mg tablet, Take 1 tablet (20 mg total) by mouth daily with dinner, Disp: 30 tablet, Rfl: 3  •  docusate sodium (COLACE) 100 mg capsule, Take 1 capsule (100 mg total) by mouth 2 (two) times a day as needed for constipation, Disp: 60 capsule, Rfl: 1  •  ferrous gluconate (FERGON) 240 (27 FE) MG tablet, Take 1 tablet (240 mg total) by mouth daily with breakfast, Disp: 90 tablet, Rfl: 0  •  metoprolol tartrate (LOPRESSOR) 100 mg tablet, Take 1 tablet (100 mg total) by mouth every 12 (twelve) hours, Disp: 60 tablet, Rfl: 3  •  oxyCODONE (ROXICODONE) 5 immediate release tablet, Take 1 tablet (5 mg total) by mouth every 6 (six) hours as needed for moderate  pain or severe pain for up to 10 days Max Daily Amount: 20 mg, Disp: 21 tablet, Rfl: 0  •  pantoprazole (PROTONIX) 40 mg tablet, Take 1 tablet (40 mg total) by mouth daily in the early morning Take one tablet once daily x 5 days then stop, Disp: 30 tablet, Rfl: 0  •  polyethylene glycol (MIRALAX) 17 g packet, Take 17 g by mouth daily as needed (constipation), Disp: 30 each, Rfl: 1      Social History     Socioeconomic History   • Marital status: Unknown     Spouse name: Not on file   • Number of children: Not on file   • Years of education: Not on file   • Highest education level: Not on file   Occupational History   • Not on file   Tobacco Use   • Smoking status: Former     Average packs/day: 0.5 packs/day for 45.7 years (22.9 ttl pk-yrs)     Types: Cigarettes     Start date: 1979   • Smokeless tobacco: Never   • Tobacco comments:     daily   Vaping Use   • Vaping status: Never Used   Substance and Sexual Activity   • Alcohol use: Yes     Alcohol/week: 3.0 standard drinks of alcohol     Types: 3 Cans of beer per week     Comment: weekly   • Drug use: Yes     Types: Marijuana   • Sexual activity: Not on file   Other Topics Concern   • Not on file   Social History Narrative   • Not on file     Social Determinants of Health     Financial Resource Strain: Not on file   Food Insecurity: No Food Insecurity (9/23/2024)    Hunger Vital Sign    • Worried About Running Out of Food in the Last Year: Never true    • Ran Out of Food in the Last Year: Never true   Transportation Needs: No Transportation Needs (9/23/2024)    PRAPARE - Transportation    • Lack of Transportation (Medical): No    • Lack of Transportation (Non-Medical): No   Physical Activity: Not on file   Stress: Not on file   Social Connections: Not on file   Intimate Partner Violence: Not on file   Housing Stability: Low Risk  (9/23/2024)    Housing Stability Vital Sign    • Unable to Pay for Housing in the Last Year: No    • Number of Times Moved in the Last  "Year: 0    • Homeless in the Last Year: No       Family History   Family history unknown: Yes       Physical Exam  Vitals and nursing note reviewed.   Constitutional:       General: He is not in acute distress.  HENT:      Head: Normocephalic and atraumatic.   Eyes:      Extraocular Movements: Extraocular movements intact.      Conjunctiva/sclera: Conjunctivae normal.   Cardiovascular:      Rate and Rhythm: Normal rate and regular rhythm.      Pulses: Intact distal pulses.      Heart sounds: Normal heart sounds.   Pulmonary:      Effort: Pulmonary effort is normal.      Breath sounds: Normal breath sounds.   Chest:      Comments: Sternal, right upper chest and chest tube site incisions all clean and dry.  Skin edges well-approximated: no erythema or drainage  Abdominal:      General: Bowel sounds are normal.      Palpations: Abdomen is soft.   Musculoskeletal:         General: Normal range of motion.      Cervical back: Normal range of motion and neck supple.   Skin:     General: Skin is warm and dry.   Neurological:      Mental Status: He is alert and oriented to person, place, and time.   Psychiatric:         Mood and Affect: Mood normal.         Vitals: Blood pressure 120/60, pulse 69, height 5' 11\" (1.803 m), weight 80.2 kg (176 lb 12.8 oz), SpO2 96%.   Wt Readings from Last 3 Encounters:   10/03/24 80.2 kg (176 lb 12.8 oz)   10/01/24 79.6 kg (175 lb 6.4 oz)   09/30/24 81.6 kg (180 lb)           Labs & Results:  Lab Results   Component Value Date    WBC 13.90 (H) 09/30/2024    HGB 8.2 (L) 09/30/2024    HCT 25.7 (L) 09/30/2024    MCV 90 09/30/2024     (H) 09/30/2024     BNP   Date Value Ref Range Status   09/19/2024 49 0 - 100 pg/mL Final     No components found for: \"CHEM\"  No results found for: \"CKTOTAL\", \"TROPONINI\", \"TROPONINT\", \"CKMBINDEX\"  No results found for this or any previous visit.    No results found for this or any previous visit.    No valid procedures specified.  No results found for this " or any previous visit.                This note was completed in part utilizing [employer] direct voice recognition software.   Grammatical errors, random word insertion, spelling mistakes, and incomplete sentences may be an occasional consequence of the system secondary to software limitations, ambient noise and hardware issues. At the time of dictation, efforts were made to edit, clarify and /or correct errors.  Please read the chart carefully and recognize, using context, where substitutions have occurred.  If you have any questions or concerns about the context, text or information contained within the body of this dictation, please contact myself, the provider, for further clarification

## 2024-10-04 ENCOUNTER — HOME CARE VISIT (OUTPATIENT)
Dept: HOME HEALTH SERVICES | Facility: HOME HEALTHCARE | Age: 59
End: 2024-10-04
Payer: COMMERCIAL

## 2024-10-04 NOTE — CASE COMMUNICATION
FYI only, CMS regulation require us to notify you that there is a deviation from SN visit pattern for this week. Pt will be seen once this week instead of projected 2x/wk visit pattern. Next visit is scheduled for 10/8/24. Pt agreeable to call [facility] w/ questions/concerns.

## 2024-10-07 ENCOUNTER — TELEPHONE (OUTPATIENT)
Dept: HOME HEALTH SERVICES | Facility: HOME HEALTHCARE | Age: 59
End: 2024-10-07

## 2024-10-08 ENCOUNTER — HOME CARE VISIT (OUTPATIENT)
Dept: HOME HEALTH SERVICES | Facility: HOME HEALTHCARE | Age: 59
End: 2024-10-08
Payer: COMMERCIAL

## 2024-10-08 VITALS
TEMPERATURE: 97.6 F | HEART RATE: 87 BPM | DIASTOLIC BLOOD PRESSURE: 90 MMHG | RESPIRATION RATE: 18 BRPM | WEIGHT: 171.2 LBS | OXYGEN SATURATION: 100 % | BODY MASS INDEX: 23.88 KG/M2 | SYSTOLIC BLOOD PRESSURE: 150 MMHG

## 2024-10-08 PROCEDURE — G0299 HHS/HOSPICE OF RN EA 15 MIN: HCPCS

## 2024-10-08 NOTE — CASE COMMUNICATION
Hello, I had a ome visit w/ your pt today. His BP was 150/90. Pt asymptomatic. He states that he had just taken his meds. I just wanted to make you aware. Thank you.

## 2024-10-09 ENCOUNTER — HOME CARE VISIT (OUTPATIENT)
Dept: HOME HEALTH SERVICES | Facility: HOME HEALTHCARE | Age: 59
End: 2024-10-09
Payer: COMMERCIAL

## 2024-10-09 ENCOUNTER — TELEPHONE (OUTPATIENT)
Age: 59
End: 2024-10-09

## 2024-10-09 NOTE — TELEPHONE ENCOUNTER
I recommend he reach out to his cardiologist or cardiothoracic surgeon regarding clearance and work restriction.

## 2024-10-09 NOTE — TELEPHONE ENCOUNTER
Pt called requesting a dr note from Dr. Moreira stating that he is still under dr's care and dr has not released him yet. He also wants a copy of last office summary.     Please call patient when ready.    [phone] can leave message

## 2024-10-09 NOTE — TELEPHONE ENCOUNTER
Caller: Santos Sandoval    Doctor: Fidelia Fofana    Reason for call: Pt needs paperwork to excuse him from work.  That he is under Fidelia's care & will continue until released.  He also needs the Visit Summary pages from the last visit.    Pt asking that it be mailed to his home since he is unable to drive.  Please advise once mailed and you may leave a vmail.    Call back#: [phone]   6

## 2024-10-10 ENCOUNTER — CLINICAL SUPPORT (OUTPATIENT)
Dept: CARDIAC REHAB | Age: 59
End: 2024-10-10

## 2024-10-10 ENCOUNTER — HOME CARE VISIT (OUTPATIENT)
Dept: HOME HEALTH SERVICES | Facility: HOME HEALTHCARE | Age: 59
End: 2024-10-10
Payer: COMMERCIAL

## 2024-10-10 VITALS
HEART RATE: 92 BPM | BODY MASS INDEX: 24.16 KG/M2 | RESPIRATION RATE: 18 BRPM | SYSTOLIC BLOOD PRESSURE: 158 MMHG | DIASTOLIC BLOOD PRESSURE: 84 MMHG | TEMPERATURE: 98 F | OXYGEN SATURATION: 94 % | WEIGHT: 173.2 LBS

## 2024-10-10 DIAGNOSIS — Z98.890 S/P AORTIC DISSECTION REPAIR: ICD-10-CM

## 2024-10-10 PROCEDURE — G0299 HHS/HOSPICE OF RN EA 15 MIN: HCPCS

## 2024-10-10 NOTE — PROGRESS NOTES
CARDIAC REHABILITATION   ASSESSMENT AND INDIVIDUALIZED TREATMENT PLAN  INITIAL           Today's date: 10/10/2024   # of Exercise Sessions Completed: 1  Patient name: Santos Sandoval      : 1965  Age: 59 y.o.       MRN: [id number]  Referring Physician: Dr. Menchaca  Cardiologist: Dr. Pisano  Provider: [place]  Clinician: Maricruz Prajapati MS, CEP        Treatment is tailored to this patient's individual needs.  The ITP was reviewed with the patient and all questions were answered to their satisfaction.  Additional ITP documentation can be found electronically including daily and monthly exercise summaries, daily session notes with ECG summaries, education notes, daily medication reconciliation, and daily physician supervision.      Comments:  at [employer], works nights. Came home after work, took shower,         Dx:   Encounter Diagnosis   Name Primary?    S/P aortic dissection repair        Description of Diagnosis: presented to [facility] ED with severe chest pain radiating down into his abdomen and legs. The pain started suddenly this afternoon when he was getting out of the shower. He felt like he pulled a muscle. The pain was associated with diaphoresis and SOB. He has not been taking his BP meds. He is a 40 pack year smoker. Type A aortic dissection. [place].  Date of onset:   Other Cardiac History: none prior.        ASSESSMENT    Medical History:   Past Medical History:   Diagnosis Date    Colon polyps     Hypertension        Family History:  Family History   Family history unknown: Yes       Allergies:   Patient has no known allergies.    Current Medications:   Current Outpatient Medications   Medication Sig Dispense Refill    acetaminophen (TYLENOL) 325 mg tablet Take 2 tablets (650 mg total) by mouth every 6 (six) hours as needed for mild pain, headaches or fever (Patient taking differently: Take 650 mg by mouth every 6 (six) hours as needed for mild pain, headaches or fever. pt  taking 500mg (2) daily PRN)      amLODIPine-valsartan (EXFORGE) 5-160 MG per tablet Take 1 tablet by mouth daily 30 tablet 3    aspirin 325 mg tablet Take 1 tablet (325 mg total) by mouth daily 30 tablet 3    atorvastatin (LIPITOR) 20 mg tablet Take 1 tablet (20 mg total) by mouth daily with dinner 30 tablet 3    docusate sodium (COLACE) 100 mg capsule Take 1 capsule (100 mg total) by mouth 2 (two) times a day as needed for constipation 60 capsule 1    ferrous gluconate (FERGON) 240 (27 FE) MG tablet Take 1 tablet (240 mg total) by mouth daily with breakfast 90 tablet 0    metoprolol tartrate (LOPRESSOR) 100 mg tablet Take 1 tablet (100 mg total) by mouth every 12 (twelve) hours 60 tablet 3    oxyCODONE (ROXICODONE) 5 immediate release tablet Take 1 tablet (5 mg total) by mouth every 6 (six) hours as needed for moderate pain or severe pain for up to 10 days Max Daily Amount: 20 mg 21 tablet 0    pantoprazole (PROTONIX) 40 mg tablet Take 1 tablet (40 mg total) by mouth daily in the early morning Take one tablet once daily x 5 days then stop 30 tablet 0    polyethylene glycol (MIRALAX) 17 g packet Take 17 g by mouth daily as needed (constipation) 30 each 1     No current facility-administered medications for this visit.       Medication compliance: Yes   Comments: Pt reports to be compliant with medications    Physical Limitations: lifting restrictions     Fall Risk: Moderate   Comments: Reports a fall in the past 6 months    Cultural needs: none      CAD Risk Factors:  Cholesterol: Yes  HTN: Yes  DM: No  Obesity: No   Inactivity: Yes      EXERCISE ASSESSMENT:     Initial Fitness Assessment:  N/A - pt has not had f/u with CT surgery.      ECG INTERPRETATION:  NSR    Current Functional Status:  Occupation: full time job [employer]  Recreation/Physical Activity: none  ADL’s:Capable of performing light ADLs only  [place]: Capable of performing light ADLs only limited by lifting restrictions  Home exercise:  home  "PT  Other Comments:       SMART Exercise Goals:   increased exercise capacity by 40% based on peak METs tolerated in cardiac rehab exercise session  maintain > 150 minutes per week of moderate intensity exercise    Patient Specific EXERCISE GOALS:       Build strength   Increase endurance and stamina     Functional Capacity Screening Tool:  [facility] Activity Status Index:  5.29 METs      PSYCHOSOCIAL ASSESSMENT:    Date of last Assessment:  10/10  Depression screening:  PHQ-9 = 5    Interpretation:  5-9 = Mild Depression  Anxiety screening:  ZULMA-7 = 1    Interpretation: 0-4  = Not anxious    Pt self-report of depression and anxiety   Patient reports they are coping well with good social support and denies depression or anxiety    Self-reported stress level:  4   Stressors:  can't do what he is used to doing. Taking things slow right now  Stress Management Tools: exercise and spend time with family    SMART Psychosocial Goals:     PHQ-9 - reduced severity by one level and feel less tired with more energy    Patient Specific PSYCHOCOSOCIAL GOALS:    Improved energy with activity    Quality of Life Screen:  (Higher score indicates disease impact on QOL)  [facility] COOP score: 26/45     Social Support:   significant other and siblings  Community/Social Activities:      Psychosocial Assessment as it relates to rehabilitation:   Patient denies issues with his/her family or home life that may affect their rehabilitation efforts.       NUTRITION ASSESSMENT:    Initial Weight:  169  Current Weight:     Height:   Ht Readings from Last 1 Encounters:   10/03/24 5' 11\" (1.803 m)       Rate Your Plate Score: 59/81      Lipid management: Discussed diet and lipid management and Last lipid profile 6/24/23  Chol 161  TRG 68  HDL 47      Current Dietary Habits:  Low salt  Has switched from whole milk to 2%  Working on reducing processed foods      SMART Nutrition Goals:   Improved Rate Your Plate score  >64, eat 5 or more servings " of fruits and vegetables a day, eat 2 or more servings of low fat milk or yogurt a day, dine out less than once per week or choose low fat restaurant meals, rarely eat processed meats or eat low fat processed meats, choose 1% or skim milk, rarely eat cheese or choose reduced fat or skim, rarely eat frozen desserts or choose fruit or fat-free sweets, choose healthy snacks such as fruit, pretzels, and low fat crackers, choose healthy desserts and sweets such as kt food cake or  fruit, rarely/never eat salty snacks, and choose low sugar desserts and sweets    Patient Specific NUTRITION GOALS:     1. Improve diet   2. Portion sizes    3. Learn serving sizes    Drug/Alcohol Use:   Rarely drinks.       OTHER CORE COMPONENT ASSESSMENT:    Tobacco Use:     Pt quit within the past year and has abstained    Anginal Symptoms:  SOB, excessive diaphoresis, and severe pain   NTG use: No prescription    SMART Goals:   consistent, controlled resting BP < 130/80, medication compliance, and abstain from smoking    Patient Specific CORE COMPONENT GOALS:    Smoking abstention   Medication compliance       INDIVIDUALIZED TREATMENT PLAN      EXERCISE GOALS and PLAN      Progress toward Exercise goals:   Reviewed Pt goals and determined plan of care    Exercise Intervention/plan:    education on home exercise guidelines    The patient was counseled on exercise guidelines to achieve a minimum of 150 mins/wk of moderate intensity (RPE 4-6)   exercise and encouraged to add 1-2 days of exercise on opposite days of cardiac rehab as tolerated.       PHYSICIAN PRESCRIBED EXERCISE:    Current Aerobic Exercise Prescription:      Frequency: 3 days/week   Supplement with home exercise 2+ days/wk as tolerated       Minutes: 20 - 40         METS: will be determined when pt is cleared to exercise             HR: RHR +30-40bpm   RPE: 4-6         Modalities: Treadmill, UBE, NuStep, and Recumbent bike     Exercise workloads will be progressed gradually  as tolerated, within limits of patient's ability, and according to the patient's   response to the exercise program.      Aerobic Exercise Prescription Plan for Progression   Frequency: 3 days/week of cardiac rehab       Supplement with home exercise 2+ days/wk as tolerated    Minutes: 40       >150 mins/wk of moderate intensity exercise   METS: will be determined   HR: Intensity based on RPE 4-6      RPE: 4-6   Modalities: Treadmill, UBE, Lifecycle, NuStep, and Recumbent bike    Strength training:  Will be added following at least 8 weeks post surgery and 8-10 monitored sessions   Modalities: Leg Press, Chest Press, Pull Downs, and Lateral Raise    Home Exercise: none    Exercise Education: benefit of exercise for CAD risk factors and RPE scale     Readiness to change: Preparation:  (Getting ready to change)       NUTRITION GOALS AND PLAN      Nutritional   Reviewed patient's Rate your Plate. Discussed key elements of heart healthy eating. Reviewed patient goals for dietary modifications and their clinical implications.  Reviewed most recent lipid profile.     Patient's progress toward Nutrition goals:    Reviewed Pt goals and determined plan of care      Nutrition Intervention/plan:   increase intake of whole grains, replace refined flours with whole grains, increase daily intake of fruits and vegetables, reduce intake and /or  rarely eat processed meats , drink/use 1%  low fat or skim  milk, eat reduced-fat or part-skim cheese or rarely eat, rarely eat frozen desserts, eat healthy snacks like fruit, pretzels, and low fat crackers, choose desserts such as fruit, kt food cake, low-fat or fat-free sweets, rarely/never eat salty snacks, choose low sugar desserts and sweets, and drink more water    Measurable goals were based Rate Your Plate Dietary Self-Assessment. These are the areas in which the patient could score higher on the assessment.  Goals include recommendations for a heart healthy diet based on  American Heart Association.    Nutrition Education:   heart healthy eating principles  low sodium diet  maintaining hydration  nutrition for  lipid management  portion control    Readiness to change: Preparation:  (Getting ready to change)  and Action:  (Changing behavior)      PSYCHOSOCIAL GOALS AND PLAN    Psychosocial Assessment as it relates to rehabilitation:   Patient denies issues with his/her family or home life that may affect their rehabilitation efforts.     Patient's progress toward Psychosocial goals:    Reviewed Pt goals and determined plan of care    Psychosocial Intervention/plan:   Exercise and Enjoy family    Psychosocial Education: benefits of a positive support system    Information to utilize [employer] was provided as well as contact information for counseling through  Behavioral Health and group psychotherapy groups available.    Readiness to change: Maintenance: (Maintaining the behavior change)      OTHER CORE COMPONENTS GOALS and PLAN      Blood Pressure will be monitored throughout the program and cardiologist will be notified of elevated trends.    Pt will be encouraged to monitor home BP if advised by cardiologist.    Tobacco Intervention/plan:   Pt quit 9/2024 and has abstained since quitting.      Progress toward Core Component goals:   Reviewed Pt goals and determined plan of care    Other Core Components Intervention:   medication compliance, complete abstention from smoking, engage in regular exercise, check labels for sodium content, and monitor home BP    Group and Individual Education:  understanding high blood pressure and it's relationship to CAD, components of blood pressure management, and effects of nicotine and tobacco    Readiness to change: Action:  (Changing behavior)

## 2024-10-10 NOTE — TELEPHONE ENCOUNTER
Reason for call:   [x] Refill   [] Prior Auth  [] Other:     Office:   [x] PCP/Provider -   [] Specialty/Provider -     Medication: oxyCODONE (ROXICODONE) 5 immediate release tablet     Dose/Frequency: Take 1 tablet (5 mg total) by mouth every 6 (six) hours as needed     Quantity: 21 tablet    Pharmacy: [address] [phone]      Does the patient have enough for 3 days?   [x] Yes   [] No - Send as HP to POD

## 2024-10-10 NOTE — CASE COMMUNICATION
Hello, I had a home visit w/ your pt today. His HR was 92. Pt was asymptomatic & states that he has been compliant w/ all meds. He has been discharged from [facility] to start cardiac rehab. I just wanted to make you aware. Thank you.

## 2024-10-10 NOTE — TELEPHONE ENCOUNTER
Letter can be drafted outlining patient unable to work currently with reevaluation on 11/8/24 if appropriate.

## 2024-10-11 RX ORDER — OXYCODONE HYDROCHLORIDE 5 MG/1
5 TABLET ORAL EVERY 6 HOURS PRN
Qty: 21 TABLET | Refills: 0 | Status: SHIPPED | OUTPATIENT
Start: 2024-10-11 | End: 2024-10-21

## 2024-10-11 NOTE — TELEPHONE ENCOUNTER
Patient called following up on a Dr's note. I did really the message from Dr. Moreira. Patient stated that he does not need a clearance letter, but  a letter that states he was seen on 10/1/24 and that he will continue being under Dr. Moreira's care. Patient would like to pick this letter up in the office along with a copy of his after visit summary from 10/1 visit.

## 2024-10-14 NOTE — TELEPHONE ENCOUNTER
LMOM notifying patient that the letter and AVS have been printed and are ready to be picked up at the [facility] .

## 2024-10-16 ENCOUNTER — TELEPHONE (OUTPATIENT)
Dept: INTERNAL MEDICINE CLINIC | Facility: OTHER | Age: 59
End: 2024-10-16

## 2024-10-16 NOTE — TELEPHONE ENCOUNTER
Received disability paperwork from [employer] to be completed for patient.  Forms were given to Dr. Moreira, who reviewed and stated that his cardiologist would be the one to complete these forms.  Forms have been scanned into patients chart and faxed to his cardiologist.

## 2024-10-17 ENCOUNTER — HOSPITAL ENCOUNTER (OUTPATIENT)
Dept: CT IMAGING | Facility: HOSPITAL | Age: 59
End: 2024-10-17
Payer: COMMERCIAL

## 2024-10-17 DIAGNOSIS — Z98.890 S/P AORTIC DISSECTION REPAIR: ICD-10-CM

## 2024-10-17 PROCEDURE — 71275 CT ANGIOGRAPHY CHEST: CPT

## 2024-10-17 RX ADMIN — IOHEXOL 85 ML: 350 INJECTION, SOLUTION INTRAVENOUS at 09:16

## 2024-10-18 NOTE — TELEPHONE ENCOUNTER
Spoke to marlen and [facility] [place], confirmed that they have received the [place] forms, they are on his providers desk and will be completed and faxed promptly.

## 2024-10-22 ENCOUNTER — TELEPHONE (OUTPATIENT)
Age: 59
End: 2024-10-22

## 2024-10-22 NOTE — TELEPHONE ENCOUNTER
Spoke with patient regarding disability forms he needs filled out.    Blank form scanned into his chart; TE states were printed and placed on providers desk. Any update on when they can be filled out and faxed? His disability company contacted him about them, needs to be faxed back by this Friday.    Please update Santos at [phone]

## 2024-10-22 NOTE — TELEPHONE ENCOUNTER
Spoke to PT. PT will come to the [place] office. PT will fill out his section of the paperwork. [place] office will fax paper work to [place]. Paperwork is due on Friday 10/25/2024

## 2024-10-24 ENCOUNTER — OFFICE VISIT (OUTPATIENT)
Dept: CARDIAC SURGERY | Facility: CLINIC | Age: 59
End: 2024-10-24

## 2024-10-24 ENCOUNTER — TELEPHONE (OUTPATIENT)
Dept: CARDIAC SURGERY | Facility: CLINIC | Age: 59
End: 2024-10-24

## 2024-10-24 VITALS
DIASTOLIC BLOOD PRESSURE: 56 MMHG | BODY MASS INDEX: 24.88 KG/M2 | OXYGEN SATURATION: 96 % | HEIGHT: 71 IN | SYSTOLIC BLOOD PRESSURE: 114 MMHG | HEART RATE: 70 BPM | TEMPERATURE: 97.7 F | WEIGHT: 177.7 LBS

## 2024-10-24 DIAGNOSIS — Z98.890 S/P AORTIC DISSECTION REPAIR: Primary | ICD-10-CM

## 2024-10-24 DIAGNOSIS — Z48.89 ENCOUNTER FOR POSTOPERATIVE CARE: ICD-10-CM

## 2024-10-24 DIAGNOSIS — I71.010 TYPE 1 DISSECTION OF ASCENDING AORTA (HCC): ICD-10-CM

## 2024-10-24 PROCEDURE — 99024 POSTOP FOLLOW-UP VISIT: CPT | Performed by: THORACIC SURGERY (CARDIOTHORACIC VASCULAR SURGERY)

## 2024-10-24 NOTE — PROGRESS NOTES
" POST OP FOLLOW UP VISIT    Procedure:   9/19/24    1. Ascending aorta and full arch replacement with a 28 mm Gelweave graft and a 12/8/8 Gelweave trifurcated graft.  2. Frozen elephant trunk with a [place] TAG 34 x 34 x 150 endovascular graft.  3. Resuspension of the aortic valve.     History: Santos Sandoval is a 59 y.o. year old male who presents to our office today for routine follow up care from emergent type A aortic dissection repair.  Patient tolerated procedure well. Pos to course was uncomplicated. He was discharged to home on POD#5.  He has seen PCP and Cardiology for follow-up.   Patient states he is making progress. He states he is intermittently tired and SOB but states he maybe doing \"too much.\" He reports chest wall aches and soreness. He denies incisional issues or sternal instability.  He is eating well, denies GI disturbances. His sleep is fragmented.  He did just start cardiac rehab.      Vital Signs:   Vitals:    10/24/24 1351   BP: 114/56   BP Location: Left arm   Patient Position: Sitting   Cuff Size: Standard   Pulse: 70   Temp: 97.7 °F (36.5 °C)   SpO2: 96%   Weight: 80.6 kg (177 lb 11.2 oz)   Height: 5' 11\" (1.803 m)       Home Medications:   Prior to Admission medications    Medication Sig Start Date End Date Taking? Authorizing Provider   acetaminophen (TYLENOL) 325 mg tablet Take 2 tablets (650 mg total) by mouth every 6 (six) hours as needed for mild pain, headaches or fever  Patient taking differently: Take 650 mg by mouth every 6 (six) hours as needed for mild pain, headaches or fever pt taking 500mg (2) daily PRN 9/24/24  Yes Luis Angel Vaughn PA-C   amLODIPine-valsartan (EXFORGE) 5-160 MG per tablet Take 1 tablet by mouth daily 9/24/24  Yes Luis Angel Vaughn PA-C   aspirin 325 mg tablet Take 1 tablet (325 mg total) by mouth daily 9/25/24  Yes Luis Angel Vaughn PA-C   atorvastatin (LIPITOR) 20 mg tablet Take 1 tablet (20 mg total) by mouth daily with dinner 9/24/24  Yes Luis Angel Vaughn PA-C "   docusate sodium (COLACE) 100 mg capsule Take 1 capsule (100 mg total) by mouth 2 (two) times a day as needed for constipation 10/1/24  Yes Sheldon Moreira MD   metoprolol tartrate (LOPRESSOR) 100 mg tablet Take 1 tablet (100 mg total) by mouth every 12 (twelve) hours 9/24/24  Yes Luis Angel Vaughn PA-C   polyethylene glycol (MIRALAX) 17 g packet Take 17 g by mouth daily as needed (constipation) 10/1/24  Yes Sheldon Moreira MD   ferrous gluconate (FERGON) 240 (27 FE) MG tablet Take 1 tablet (240 mg total) by mouth daily with breakfast  Patient not taking: Reported on 10/24/2024 10/1/24   Sheldon Moreira MD   pantoprazole (PROTONIX) 40 mg tablet Take 1 tablet (40 mg total) by mouth daily in the early morning Take one tablet once daily x 5 days then stop  Patient not taking: Reported on 10/24/2024 9/25/24   Luis Angel Vaughn PA-C       Physical Exam:  General: Alert, oriented, well developed, no acute distress  HEENT/NECK:  PERRLA.  No jugular venous distention.    Cardiac:Regular rate and rhythm, no murmurs rubs or gallops.  Pulmonary:Breath sounds clear bilaterally  Abdomen:  Non-tender, Non-distended.  Positive bowel sounds.  Upper extremities: 2+ radial pulses; brisk capillary refill  Lower extremities: Extremities warm/dry. PT/DP pules 2+ bilaterally.  No edema B/L  Incisions: Sternum is stable.  Incision is clean, dry, and intact.  CT clean, dry, and intact  Musculoskeletal: MAEE, stable gait  Neuro: Alert and oriented X 3.  Sensation is grossly intact.  No focal deficits  Skin: Warm/Dry, without rashes or lesions.    Lab Results:     Lab Results   Component Value Date    WBC 13.90 (H) 09/30/2024    HGB 8.2 (L) 09/30/2024    HCT 25.7 (L) 09/30/2024    MCV 90 09/30/2024     (H) 09/30/2024     Lab Results   Component Value Date    SODIUM 135 09/30/2024    K 4.3 09/30/2024     09/30/2024    CO2 25 09/30/2024    BUN 24 09/30/2024    CREATININE 0.93 09/30/2024    GLUC 108 09/30/2024    CALCIUM 8.5 09/30/2024      Lab Results   Component Value Date    HGBA1C 6.0 (H) 09/20/2024         Imaging Studies:     CTA chest: 10/17/24    1.  Expected postsurgical changes related to ascending thoracic aortic dissection repair. Volume of both gas and hemorrhage in the mediastinum has continued to improve compared to the study 2 weeks ago.  2.  Stable caliber of the type B aortic dissection, which is incompletely visualized caudally.    Results Review Statement: I personally reviewed the following image studies in PACS and associated radiology reports: CTA chest. My interpretation of the radiology images/reports is: stable post op appearance .    Assessment:   Type A Aortic Dissection.   S/P   1. Ascending aorta and full arch replacement with a 28 mm Gelweave graft and a 12/8/8 Gelweave trifurcated graft.  2. Frozen elephant trunk with a [place] TAG 34 x 34 x 150 endovascular graft.  3. Resuspension of the aortic valve.       Santos Sandoval is 5 weeks post-op, making good progress in their recovery.  Incisions are well-healed and the sternum is stable.   Weight and VS are stable.     Plan:   Medications reviewed with patient, associated questions answered and no changes made.     Benefits of participating in cardiac rehab have been discussed and patient is cleared to begin the outpatient  program.     May resume driving.    Increase activity as tolerated and maintain alifting restriction of no more than 25 lbs until 12/12/24, which is 12 weeks from the surgical date. Then no lifting no more than 50 lbs, no straining lifelong     Follow up in aortic clinic in 1 year with CTA c/a/p & echocardiogram    Patient should maintain routine follow-up with Cardiology and Primary Care for ongoing medical care.     Patient verbalizes understanding of recommendations and all questions were answered to their satisfaction.      EDUARDO Carranza  10/24/24  1:45PM

## 2024-10-24 NOTE — PATIENT INSTRUCTIONS
Things to know as you continue to recover after heart surgery:    INCISION CARE:  It is normal to experience soreness as your chest wall continues to recover. Brief sharp shooting pains, achiness, tightness or pulling sensations are all normal.  As your skin incisions heal, sometimes small tender lumps or pimple-like bumps develop which is due to your body attempting to “dissolve” the suture (stiches) underneath the skin. This is normal.  However, if you develop new redness, pain, swelling or drainage at a skin incision, call our office.  You may use scar creams, cocoa butter, Vitamin E, etc on your incisions 12 weeks after your surgery.   Your lifting restricting increases to 25 lb on now until 12/12/24 (12 weeks from your surgical date), then no more than 50 lb thereafter, lifelong. This is to allow your sternum (breastbone) to fully heal.  Feeling frequent popping or clicking in your sternum is not normal and you should call our office if this occurs. Feeling an occasional pop or click is expected.  It is normal to have minor swelling in the leg that vein was removed for your bypass surgery. Over time the swelling improves or resolves.     ACTIVITY:  Participating in the cardiac rehab program is an excellent way for your heart to recover and strengthen after surgery as well as improve your overall stamina. The rehab therapists will also have you start light upper body exercises to help you return to normal activity. You may begin cardiac rehab now. Maintaining regular exercise, even after completing cardiac rehab, is highly recommended as it also helps with blood sugar and weight regulation.  You may now try sleep on your side. Using extra pillows at first to provide support may help with comfort.    MEDICATIONS:  All your medications will be managed by your PCP and/or Cardiologist. Please contact their office for refills.  Most over the counter supplements may be resumed after your post -op appointment with the  surgeon.    LIFESTYLE:  We strongly encourage you do not smoke as it impacts healing and, if you underwent coronary artery bypass graft surgery, it impacts how long your bypass grafts stay open.  Smoking can also increase abnormal blood clot formation.  Following a “heart healthy” diet is always a good idea.  You can get nutrition information and diet guidance at the cardiac rehab program or ask your PCP for a referral to a [facility] dietician.

## 2024-10-24 NOTE — TELEPHONE ENCOUNTER
LM for patient regarding post op appointment scheduled for today @ 4:15 pm.  Can offer the patient a earlier time of 1:30 or 1:45 pm.    PLEASE TRANSFER CALL TO OFFICE.

## 2024-10-24 NOTE — LETTER
"October 24, 2024     Sheldon Moreira MD  [address]  [address]  [facility], [place] [address]    Patient: Santos Sandoval   YOB: 1965   Date of Visit: 10/24/2024       Dear Dr. Moerira:    Thank you for referring Santos Sandoval to me for evaluation. Below are my notes for this consultation.    If you have questions, please do not hesitate to call me. I look forward to following your patient along with you.         Sincerely,        Rio Menchaca MD        CC: No Recipients    EDUARDO Carranza  10/24/2024  2:58 PM  Attested   POST OP FOLLOW UP VISIT    Procedure:   9/19/24    1. Ascending aorta and full arch replacement with a 28 mm Gelweave graft and a 12/8/8 Gelweave trifurcated graft.  2. Frozen elephant trunk with a [place] TAG 34 x 34 x 150 endovascular graft.  3. Resuspension of the aortic valve.     History: Santos Sandoval is a 59 y.o. year old male who presents to our office today for routine follow up care from emergent type A aortic dissection repair.  Patient tolerated procedure well. Pos to course was uncomplicated. He was discharged to home on POD#5.  He has seen PCP and Cardiology for follow-up.   Patient states he is making progress. He states he is intermittently tired and SOB but states he maybe doing \"too much.\" He reports chest wall aches and soreness. He denies incisional issues or sternal instability.  He is eating well, denies GI disturbances. His sleep is fragmented.  He did just start cardiac rehab.      Vital Signs:   Vitals:    10/24/24 1351   BP: 114/56   BP Location: Left arm   Patient Position: Sitting   Cuff Size: Standard   Pulse: 70   Temp: 97.7 °F (36.5 °C)   SpO2: 96%   Weight: 80.6 kg (177 lb 11.2 oz)   Height: 5' 11\" (1.803 m)       Home Medications:   Prior to Admission medications    Medication Sig Start Date End Date Taking? Authorizing Provider   acetaminophen (TYLENOL) 325 mg tablet Take 2 tablets (650 mg total) by mouth every 6 (six) hours as needed for mild pain, " headaches or fever  Patient taking differently: Take 650 mg by mouth every 6 (six) hours as needed for mild pain, headaches or fever pt taking 500mg (2) daily PRN 9/24/24  Yes Luis Angel Vaughn PA-C   amLODIPine-valsartan (EXFORGE) 5-160 MG per tablet Take 1 tablet by mouth daily 9/24/24  Yes Luis Angel Vaughn PA-C   aspirin 325 mg tablet Take 1 tablet (325 mg total) by mouth daily 9/25/24  Yes Luis Angel Vaughn PA-C   atorvastatin (LIPITOR) 20 mg tablet Take 1 tablet (20 mg total) by mouth daily with dinner 9/24/24  Yes Luis Angel Vaughn PA-C   docusate sodium (COLACE) 100 mg capsule Take 1 capsule (100 mg total) by mouth 2 (two) times a day as needed for constipation 10/1/24  Yes Sheldon Moreira MD   metoprolol tartrate (LOPRESSOR) 100 mg tablet Take 1 tablet (100 mg total) by mouth every 12 (twelve) hours 9/24/24  Yes Luis Angel Vaughn PA-C   polyethylene glycol (MIRALAX) 17 g packet Take 17 g by mouth daily as needed (constipation) 10/1/24  Yes Sheldon Moreira MD   ferrous gluconate (FERGON) 240 (27 FE) MG tablet Take 1 tablet (240 mg total) by mouth daily with breakfast  Patient not taking: Reported on 10/24/2024 10/1/24   Sheldon Moreira MD   pantoprazole (PROTONIX) 40 mg tablet Take 1 tablet (40 mg total) by mouth daily in the early morning Take one tablet once daily x 5 days then stop  Patient not taking: Reported on 10/24/2024 9/25/24   Luis Angel Vaughn PA-C       Physical Exam:  General: Alert, oriented, well developed, no acute distress  HEENT/NECK:  PERRLA.  No jugular venous distention.    Cardiac:Regular rate and rhythm, no murmurs rubs or gallops.  Pulmonary:Breath sounds clear bilaterally  Abdomen:  Non-tender, Non-distended.  Positive bowel sounds.  Upper extremities: 2+ radial pulses; brisk capillary refill  Lower extremities: Extremities warm/dry. PT/DP pules 2+ bilaterally.  No edema B/L  Incisions: Sternum is stable.  Incision is clean, dry, and intact.  CT clean, dry, and intact  Musculoskeletal: MAEE, stable  gait  Neuro: Alert and oriented X 3.  Sensation is grossly intact.  No focal deficits  Skin: Warm/Dry, without rashes or lesions.    Lab Results:     Lab Results   Component Value Date    WBC 13.90 (H) 09/30/2024    HGB 8.2 (L) 09/30/2024    HCT 25.7 (L) 09/30/2024    MCV 90 09/30/2024     (H) 09/30/2024     Lab Results   Component Value Date    SODIUM 135 09/30/2024    K 4.3 09/30/2024     09/30/2024    CO2 25 09/30/2024    BUN 24 09/30/2024    CREATININE 0.93 09/30/2024    GLUC 108 09/30/2024    CALCIUM 8.5 09/30/2024     Lab Results   Component Value Date    HGBA1C 6.0 (H) 09/20/2024         Imaging Studies:     CTA chest: 10/17/24    1.  Expected postsurgical changes related to ascending thoracic aortic dissection repair. Volume of both gas and hemorrhage in the mediastinum has continued to improve compared to the study 2 weeks ago.  2.  Stable caliber of the type B aortic dissection, which is incompletely visualized caudally.    Results Review Statement: I personally reviewed the following image studies in PACS and associated radiology reports: CTA chest. My interpretation of the radiology images/reports is: stable post op appearance .    Assessment:   Type A Aortic Dissection.   S/P   1. Ascending aorta and full arch replacement with a 28 mm Gelweave graft and a 12/8/8 Gelweave trifurcated graft.  2. Frozen elephant trunk with a [place] TAG 34 x 34 x 150 endovascular graft.  3. Resuspension of the aortic valve.       Santos Sandoval is 5 weeks post-op, making good progress in their recovery.  Incisions are well-healed and the sternum is stable.   Weight and VS are stable.     Plan:   Medications reviewed with patient, associated questions answered and no changes made.     Benefits of participating in cardiac rehab have been discussed and patient is cleared to begin the outpatient  program.     May resume driving.    Increase activity as tolerated and maintain alifting restriction of no more than 25  lbs until 12/12/24, which is 12 weeks from the surgical date. Then no lifting no more than 50 lbs, no straining lifelong     Follow up in aortic clinic in 1 year with CTA c/a/p & echocardiogram    Patient should maintain routine follow-up with Cardiology and Primary Care for ongoing medical care.     Patient verbalizes understanding of recommendations and all questions were answered to their satisfaction.      EDUARDO Carranza  10/24/24  1:45PM  Attestation signed by Rio Menchaca MD at 10/24/2024  3:41 PM:  Attending Attestation:    I supervised the Advanced Practitioner on 10/24/2024.  I discussed the case with the Advanced Practitioner, reviewed the note and agree.    Santos is a very pleasant 59-year-old man that returns today for a 1 month postop follow-up after repair of a type a dissection with an ascending aorta and full arch replacement and a frozen elephant trunk.  He continues to recover well without complications.  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  There is no need for intervention at this time.  We talked about the importance of maintaining adequate blood pressure control, avoid tobacco products, avoid sustained straining.  He was instructed to go to emergency department if he were to experience severe chest pain or severe upper back pain.  I would like to see him back in 1 year with a CTA of the chest abdomen and pelvis and a transthoracic echo.

## 2024-10-25 ENCOUNTER — APPOINTMENT (OUTPATIENT)
Dept: CARDIAC REHAB | Age: 59
End: 2024-10-25
Payer: COMMERCIAL

## 2024-10-29 ENCOUNTER — CLINICAL SUPPORT (OUTPATIENT)
Dept: CARDIAC REHAB | Age: 59
End: 2024-10-29
Payer: COMMERCIAL

## 2024-10-29 DIAGNOSIS — Z98.890 S/P AORTIC DISSECTION REPAIR: Primary | ICD-10-CM

## 2024-10-29 PROCEDURE — 93798 PHYS/QHP OP CAR RHAB W/ECG: CPT

## 2024-10-30 NOTE — TELEPHONE ENCOUNTER
Patient Santos [phone] established patient of Fidelia Fofana last office visit on 10/3/2024.    Patient was advised by [facility] for his STD page 3 is incomplete.    Patient is asking if Fidelia can sign and initial page 3 and re-fax the paperwork to [facility] before November 7th.    This is time sensitive.    Patient is requesting a telephone call back once the STD paperwork has been signed, initialed and re-faxed to [facility].    Fax number is on paperwork.    Thank you.

## 2024-10-31 ENCOUNTER — APPOINTMENT (OUTPATIENT)
Dept: CARDIAC REHAB | Age: 59
End: 2024-10-31
Payer: COMMERCIAL

## 2024-10-31 ENCOUNTER — CLINICAL SUPPORT (OUTPATIENT)
Dept: CARDIAC REHAB | Age: 59
End: 2024-10-31
Payer: COMMERCIAL

## 2024-10-31 DIAGNOSIS — Z98.890 S/P AORTIC DISSECTION REPAIR: Primary | ICD-10-CM

## 2024-10-31 PROCEDURE — 93798 PHYS/QHP OP CAR RHAB W/ECG: CPT

## 2024-11-04 ENCOUNTER — APPOINTMENT (OUTPATIENT)
Dept: LAB | Facility: IMAGING CENTER | Age: 59
End: 2024-11-04
Payer: COMMERCIAL

## 2024-11-04 DIAGNOSIS — D62 ACUTE BLOOD LOSS AS CAUSE OF POSTOPERATIVE ANEMIA: ICD-10-CM

## 2024-11-04 DIAGNOSIS — I71.010 TYPE 1 DISSECTION OF ASCENDING AORTA (HCC): ICD-10-CM

## 2024-11-04 LAB
ANION GAP SERPL CALCULATED.3IONS-SCNC: 9 MMOL/L (ref 4–13)
BUN SERPL-MCNC: 30 MG/DL (ref 5–25)
CALCIUM SERPL-MCNC: 9.3 MG/DL (ref 8.4–10.2)
CHLORIDE SERPL-SCNC: 105 MMOL/L (ref 96–108)
CO2 SERPL-SCNC: 25 MMOL/L (ref 21–32)
CREAT SERPL-MCNC: 0.88 MG/DL (ref 0.6–1.3)
ERYTHROCYTE [DISTWIDTH] IN BLOOD BY AUTOMATED COUNT: 14.3 % (ref 11.6–15.1)
GFR SERPL CREATININE-BSD FRML MDRD: 92 ML/MIN/1.73SQ M
GLUCOSE P FAST SERPL-MCNC: 101 MG/DL (ref 65–99)
HCT VFR BLD AUTO: 37 % (ref 36.5–49.3)
HGB BLD-MCNC: 11.4 G/DL (ref 12–17)
MCH RBC QN AUTO: 27.9 PG (ref 26.8–34.3)
MCHC RBC AUTO-ENTMCNC: 30.8 G/DL (ref 31.4–37.4)
MCV RBC AUTO: 91 FL (ref 82–98)
PLATELET # BLD AUTO: 296 THOUSANDS/UL (ref 149–390)
PMV BLD AUTO: 8.6 FL (ref 8.9–12.7)
POTASSIUM SERPL-SCNC: 5 MMOL/L (ref 3.5–5.3)
RBC # BLD AUTO: 4.08 MILLION/UL (ref 3.88–5.62)
SODIUM SERPL-SCNC: 139 MMOL/L (ref 135–147)
WBC # BLD AUTO: 9.41 THOUSAND/UL (ref 4.31–10.16)

## 2024-11-04 PROCEDURE — 80048 BASIC METABOLIC PNL TOTAL CA: CPT

## 2024-11-04 PROCEDURE — 85027 COMPLETE CBC AUTOMATED: CPT

## 2024-11-04 PROCEDURE — 36415 COLL VENOUS BLD VENIPUNCTURE: CPT

## 2024-11-05 ENCOUNTER — CLINICAL SUPPORT (OUTPATIENT)
Dept: CARDIAC REHAB | Age: 59
End: 2024-11-05
Payer: COMMERCIAL

## 2024-11-05 DIAGNOSIS — Z98.890 S/P AORTIC DISSECTION REPAIR: Primary | ICD-10-CM

## 2024-11-05 PROCEDURE — 93798 PHYS/QHP OP CAR RHAB W/ECG: CPT

## 2024-11-05 NOTE — PROGRESS NOTES
CARDIAC REHABILITATION   ASSESSMENT AND INDIVIDUALIZED TREATMENT PLAN  30 DAY          Today's date: 2024   # of Exercise Sessions Completed: 4  Patient name: Santos Sandoval      : 1965  Age: 59 y.o.       MRN: [id number]  Referring Physician: Dr. Menchaca  Cardiologist: Dr. Pisano  Provider: [place]  Clinician: Maricruz Prajapati MS, CEP        Treatment is tailored to this patient's individual needs.  The ITP was reviewed with the patient and all questions were answered to their satisfaction.  Additional ITP documentation can be found electronically including daily and monthly exercise summaries, daily session notes with ECG summaries, education notes, daily medication reconciliation, and daily physician supervision.      Comments: Santos completed 4 sessions to date. He is working on increasing his endurance and durations prior to increasing workloads. He states he tends to want to push himself outside of rehab. Reiterated the importance of taking it easy at this time.         Dx:   Encounter Diagnosis   Name Primary?    S/P aortic dissection repair Yes       Description of Diagnosis: presented to [facility] ED with severe chest pain radiating down into his abdomen and legs. The pain started suddenly this afternoon when he was getting out of the shower. He felt like he pulled a muscle. The pain was associated with diaphoresis and SOB. He has not been taking his BP meds. He is a 40 pack year smoker. Type A aortic dissection. [place].  Date of onset:   Other Cardiac History: none prior.        ASSESSMENT    Medical History:   Past Medical History:   Diagnosis Date    Colon polyps     Hypertension        Family History:  Family History   Family history unknown: Yes       Allergies:   Patient has no known allergies.    Current Medications:   Current Outpatient Medications   Medication Sig Dispense Refill    acetaminophen (TYLENOL) 325 mg tablet Take 2 tablets (650 mg total) by mouth every 6 (six)  hours as needed for mild pain, headaches or fever (Patient taking differently: Take 650 mg by mouth every 6 (six) hours as needed for mild pain, headaches or fever pt taking 500mg (2) daily PRN)      amLODIPine-valsartan (EXFORGE) 5-160 MG per tablet Take 1 tablet by mouth daily 30 tablet 3    aspirin 325 mg tablet Take 1 tablet (325 mg total) by mouth daily 30 tablet 3    atorvastatin (LIPITOR) 20 mg tablet Take 1 tablet (20 mg total) by mouth daily with dinner 30 tablet 3    docusate sodium (COLACE) 100 mg capsule Take 1 capsule (100 mg total) by mouth 2 (two) times a day as needed for constipation 60 capsule 1    ferrous gluconate (FERGON) 240 (27 FE) MG tablet Take 1 tablet (240 mg total) by mouth daily with breakfast (Patient not taking: Reported on 10/24/2024) 90 tablet 0    metoprolol tartrate (LOPRESSOR) 100 mg tablet Take 1 tablet (100 mg total) by mouth every 12 (twelve) hours 60 tablet 3    polyethylene glycol (MIRALAX) 17 g packet Take 17 g by mouth daily as needed (constipation) 30 each 1     No current facility-administered medications for this visit.       Medication compliance: Yes   Comments: Pt reports to be compliant with medications    Physical Limitations: lifting restrictions     Fall Risk: Moderate   Comments: Reports a fall in the past 6 months    Cultural needs: none      CAD Risk Factors:  Cholesterol: Yes  HTN: Yes  DM: No  Obesity: No   Inactivity: Yes      EXERCISE ASSESSMENT:     Initial Fitness Assessment: Submaximal TM ETT:  Resting:  BP: 140/74  HR: 67, Exercise:  BP: 148/74  HR: 87, METs:  3.1, ECG Summary: NSR w/ TWI, and Test terminated at:  RPE 6      ECG INTERPRETATION:  NSR    Current Functional Status:  Occupation: full time job [employer]  Recreation/Physical Activity: none  ADL’s:Capable of performing light ADLs only  Gregory: Capable of performing light ADLs only limited by lifting restrictions  Home exercise:  home PT  Other Comments:       SMART Exercise Goals:  "  increased exercise capacity by 40% based on peak METs tolerated in cardiac rehab exercise session  maintain > 150 minutes per week of moderate intensity exercise    Patient Specific EXERCISE GOALS:       Build strength   Increase endurance and stamina     Functional Capacity Screening Tool:  [facility] Activity Status Index:  5.29 METs      PSYCHOSOCIAL ASSESSMENT:    Date of last Assessment:  10/10  Depression screening:  PHQ-9 = 5    Interpretation:  5-9 = Mild Depression  Anxiety screening:  ZULMA-7 = 1    Interpretation: 0-4  = Not anxious    Pt self-report of depression and anxiety   Patient reports they are coping well with good social support and denies depression or anxiety    Self-reported stress level:  4   Stressors:  can't do what he is used to doing. Taking things slow right now  Stress Management Tools: exercise and spend time with family    SMART Psychosocial Goals:     PHQ-9 - reduced severity by one level and feel less tired with more energy    Patient Specific PSYCHOCOSOCIAL GOALS:    Improved energy with activity    Quality of Life Screen:  (Higher score indicates disease impact on QOL)  [facility] COOP score: 26/45     Social Support:   significant other and siblings  Community/Social Activities:      Psychosocial Assessment as it relates to rehabilitation:   Patient denies issues with his/her family or home life that may affect their rehabilitation efforts.       NUTRITION ASSESSMENT:    Initial Weight:  169  Current Weight:     Height:   Ht Readings from Last 1 Encounters:   10/24/24 5' 11\" (1.803 m)       Rate Your Plate Score: 59/81      Lipid management: Discussed diet and lipid management and Last lipid profile 6/24/23  Chol 161  TRG 68  HDL 47      Current Dietary Habits:  Low salt  Has switched from whole milk to 2%  Working on reducing processed foods      SMART Nutrition Goals:   Improved Rate Your Plate score  >64, eat 5 or more servings of fruits and vegetables a day, eat 2 or more " servings of low fat milk or yogurt a day, dine out less than once per week or choose low fat restaurant meals, rarely eat processed meats or eat low fat processed meats, choose 1% or skim milk, rarely eat cheese or choose reduced fat or skim, rarely eat frozen desserts or choose fruit or fat-free sweets, choose healthy snacks such as fruit, pretzels, and low fat crackers, choose healthy desserts and sweets such as kt food cake or  fruit, rarely/never eat salty snacks, and choose low sugar desserts and sweets    Patient Specific NUTRITION GOALS:     1. Improve diet   2. Portion sizes    3. Learn serving sizes    Drug/Alcohol Use:   Rarely drinks.       OTHER CORE COMPONENT ASSESSMENT:    Tobacco Use:     Pt quit within the past year and has abstained    Anginal Symptoms:  SOB, excessive diaphoresis, and severe pain   NTG use: No prescription    SMART Goals:   consistent, controlled resting BP < 130/80, medication compliance, and abstain from smoking    Patient Specific CORE COMPONENT GOALS:    Smoking abstention   Medication compliance       INDIVIDUALIZED TREATMENT PLAN      EXERCISE GOALS and PLAN      Progress toward Exercise goals:   Pt is progressing and showing improvement  toward the following goals:  Building strength, increasing stamina and endurance, completing home exercise 2-3x/wk with walking .      Exercise Intervention/plan:    education on home exercise guidelines and home exercise 30+ mins 2 days opposite CR    The patient was counseled on exercise guidelines to achieve a minimum of 150 mins/wk of moderate intensity (RPE 4-6)   exercise and encouraged to add 1-2 days of exercise on opposite days of cardiac rehab as tolerated.       PHYSICIAN PRESCRIBED EXERCISE:    Current Aerobic Exercise Prescription:      Frequency: 3 days/week   Supplement with home exercise 2+ days/wk as tolerated       Minutes: 30-33         METS: will be determined when pt is cleared to exercise             HR: RHR  +30-40bpm   RPE: 4-6         Modalities: Treadmill, UBE, NuStep, and Recumbent bike     Exercise workloads will be progressed gradually as tolerated, within limits of patient's ability, and according to the patient's   response to the exercise program.      Aerobic Exercise Prescription Plan for Progression   Frequency: 3 days/week of cardiac rehab       Supplement with home exercise 2+ days/wk as tolerated    Minutes: 35-40      >150 mins/wk of moderate intensity exercise   METS: will be determined   HR: Intensity based on RPE 4-6      RPE: 4-6   Modalities: Treadmill, UBE, Lifecycle, NuStep, and Recumbent bike    Strength training:  Will be added following at least 8 weeks post surgery and 8-10 monitored sessions   Modalities: Leg Press, Chest Press, Pull Downs, and Lateral Raise    Home Exercise: Type: walking, Frequency: 2-3 days/week    Exercise Education: benefit of exercise for CAD risk factors and RPE scale     Readiness to change: Action:  (Changing behavior)      NUTRITION GOALS AND PLAN      Nutritional   Reviewed patient's Rate your Plate. Discussed key elements of heart healthy eating. Reviewed patient goals for dietary modifications and their clinical implications.  Reviewed most recent lipid profile.     Patient's progress toward Nutrition goals:    Pt is progressing and showing improvement  toward the following goals:  switched to 1% milk, trying to practice portion control, cut out lunch meat and processed meats, consuming less sodium.  , Pt has not made progress toward the following goals: water intake - drinking mainly milk and mixed juices. Encouraged juice with no added sugars and reviewed portion sizes..       Nutrition Intervention/plan:   increase intake of whole grains, replace refined flours with whole grains, increase daily intake of fruits and vegetables, reduce intake and /or  rarely eat processed meats , drink/use 1%  low fat or skim  milk, eat reduced-fat or part-skim cheese or rarely  eat, rarely eat frozen desserts, eat healthy snacks like fruit, pretzels, and low fat crackers, choose desserts such as fruit, kt food cake, low-fat or fat-free sweets, rarely/never eat salty snacks, choose low sugar desserts and sweets, and drink more water    Measurable goals were based Rate Your Plate Dietary Self-Assessment. These are the areas in which the patient could score higher on the assessment.  Goals include recommendations for a heart healthy diet based on American Heart Association.    Nutrition Education:   heart healthy eating principles  low sodium diet  maintaining hydration  nutrition for  lipid management  portion control  group class:  Label Reading    Readiness to change: Preparation:  (Getting ready to change)  and Action:  (Changing behavior)      PSYCHOSOCIAL GOALS AND PLAN    Psychosocial Assessment as it relates to rehabilitation:   Patient denies issues with his/her family or home life that may affect their rehabilitation efforts.     Patient's progress toward Psychosocial goals:    Pt is progressing and showing improvement  toward the following goals:  pt denies symptoms of depression or anxiety.      Psychosocial Intervention/plan:   Exercise and Enjoy family    Psychosocial Education: benefits of a positive support system, class:  Relaxation, and class:  Stress and Your Health     Information to utilize [employer] was provided as well as contact information for counseling through  Behavioral Health and group psychotherapy groups available.    Readiness to change: Maintenance: (Maintaining the behavior change)      OTHER CORE COMPONENTS GOALS and PLAN      Blood Pressure will be monitored throughout the program and cardiologist will be notified of elevated trends.    Pt will be encouraged to monitor home BP if advised by cardiologist.    Tobacco Intervention/plan:   Pt quit 9/2024 and has abstained since quitting.      Progress toward Core Component goals:   Pt is progressing  and showing improvement  toward the following goals:  medication compliance and abstaining from smoking.      Other Core Components Intervention:   medication compliance, complete abstention from smoking, engage in regular exercise, check labels for sodium content, and monitor home BP    Group and Individual Education:  understanding high blood pressure and it's relationship to CAD, components of blood pressure management, and effects of nicotine and tobacco    Readiness to change: Action:  (Changing behavior)

## 2024-11-08 ENCOUNTER — OFFICE VISIT (OUTPATIENT)
Dept: CARDIOLOGY CLINIC | Facility: CLINIC | Age: 59
End: 2024-11-08
Payer: COMMERCIAL

## 2024-11-08 VITALS
HEART RATE: 55 BPM | OXYGEN SATURATION: 98 % | HEIGHT: 71 IN | DIASTOLIC BLOOD PRESSURE: 62 MMHG | BODY MASS INDEX: 24.92 KG/M2 | WEIGHT: 178 LBS | SYSTOLIC BLOOD PRESSURE: 108 MMHG

## 2024-11-08 DIAGNOSIS — Z98.890 S/P AORTIC DISSECTION REPAIR: ICD-10-CM

## 2024-11-08 DIAGNOSIS — I71.010 TYPE 1 DISSECTION OF ASCENDING AORTA (HCC): Primary | ICD-10-CM

## 2024-11-08 DIAGNOSIS — I10 ESSENTIAL HYPERTENSION: ICD-10-CM

## 2024-11-08 PROCEDURE — 99214 OFFICE O/P EST MOD 30 MIN: CPT | Performed by: STUDENT IN AN ORGANIZED HEALTH CARE EDUCATION/TRAINING PROGRAM

## 2024-11-08 NOTE — LETTER
November 8, 2024     Patient: Santos Sandoval  YOB: 1965  Date of Visit: 11/8/2024      To Whom it May Concern:    Santos Sandoval is under my professional care. Santos was seen in my office on 11/8/2024 for follow up of major surgery. The patient is still unable to return to work and he is undergoing cardiac rehab to build his strength.     If you have any questions or concerns, please don't hesitate to call.    Sincerely,   Triny Pisano MD

## 2024-11-08 NOTE — PROGRESS NOTES
[facility] Cardiology  Follow up note  Santos Sandoval 59 y.o. male MRN: [id number]        1. Type 1 dissection of ascending aorta (HCC)  -     Ambulatory referral to Nutrition Services; Future  2. Essential hypertension  3. S/P aortic dissection repair      Assessment & Plan  Type 1 dissection of ascending aorta (HCC)   underwent S/P ascending aorta and full arch replacement, AV resuspension (Bentall procedure, ascending aortic arch repair with Gelweave graft, total arch replacement, descending aortic repair with a stent, right axillary cannulation, circulatory arrest.  ADM 9/19 - 9/24/2024.  Imaging done on 10/17/2024 which shows normal postoperative changes.  Plan to follow-up yearly with CT surgery for repeat echocardiogram and CTA of chest.  Patient's blood pressure well-controlled, currently undergoing cardiac rehab, advised on exercise/exertion limitations given aortic disease.  Essential hypertension  Blood pressure well-controlled, okay to continue current medications.  S/P aortic dissection repair  See above      HPI:   Santos Sandoval is a 59 y.o. year old male with surgical repair of a type I dissection September 2024, history of hypertension, former smoker.    Patient denies chest pain at rest, dyspnea at rest, dizziness, palpitations, orthopnea, lower extremity edema.  Does report decreased exercise tolerance, reports that he is making headway with cardiopulmonary rehabilitation but still has very decreased exercise tolerance.  Review of Systems    Past Medical History:   Diagnosis Date    Colon polyps     Hypertension      Social History     Substance and Sexual Activity   Alcohol Use Yes    Alcohol/week: 3.0 standard drinks of alcohol    Types: 3 Cans of beer per week    Comment: weekly     Social History     Substance and Sexual Activity   Drug Use Yes    Types: Marijuana     Social History     Tobacco Use   Smoking Status Former    Average packs/day: 0.5 packs/day for 45.7 years (22.9 ttl pk-yrs)     Types: Cigarettes    Start date: 1979   Smokeless Tobacco Never   Tobacco Comments    daily       Allergies:  No Known Allergies    Medications:     Current Outpatient Medications:     acetaminophen (TYLENOL) 325 mg tablet, Take 2 tablets (650 mg total) by mouth every 6 (six) hours as needed for mild pain, headaches or fever (Patient taking differently: Take 650 mg by mouth every 6 (six) hours as needed for mild pain, headaches or fever pt taking 500mg (2) daily PRN), Disp: , Rfl:     amLODIPine-valsartan (EXFORGE) 5-160 MG per tablet, Take 1 tablet by mouth daily, Disp: 30 tablet, Rfl: 3    aspirin 325 mg tablet, Take 1 tablet (325 mg total) by mouth daily, Disp: 30 tablet, Rfl: 3    atorvastatin (LIPITOR) 20 mg tablet, Take 1 tablet (20 mg total) by mouth daily with dinner, Disp: 30 tablet, Rfl: 3    metoprolol tartrate (LOPRESSOR) 100 mg tablet, Take 1 tablet (100 mg total) by mouth every 12 (twelve) hours, Disp: 60 tablet, Rfl: 3    docusate sodium (COLACE) 100 mg capsule, Take 1 capsule (100 mg total) by mouth 2 (two) times a day as needed for constipation (Patient not taking: Reported on 11/8/2024), Disp: 60 capsule, Rfl: 1    ferrous gluconate (FERGON) 240 (27 FE) MG tablet, Take 1 tablet (240 mg total) by mouth daily with breakfast (Patient not taking: Reported on 10/24/2024), Disp: 90 tablet, Rfl: 0    polyethylene glycol (MIRALAX) 17 g packet, Take 17 g by mouth daily as needed (constipation) (Patient not taking: Reported on 11/8/2024), Disp: 30 each, Rfl: 1      Vitals:    11/08/24 0928   BP: 108/62   Pulse: 55   SpO2: 98%     Weight (last 2 days)       Date/Time Weight    11/08/24 0928 80.7 (178)          Physical Exam  Vitals and nursing note reviewed.   Constitutional:       General: He is not in acute distress.     Appearance: He is well-developed.   HENT:      Head: Normocephalic and atraumatic.   Eyes:      Conjunctiva/sclera: Conjunctivae normal.   Cardiovascular:      Rate and Rhythm: Normal rate  "and regular rhythm.      Heart sounds: No murmur heard.  Pulmonary:      Effort: Pulmonary effort is normal. No respiratory distress.      Breath sounds: Normal breath sounds.   Abdominal:      Palpations: Abdomen is soft.      Tenderness: There is no abdominal tenderness.   Musculoskeletal:         General: No swelling.      Cervical back: Neck supple.   Skin:     General: Skin is warm and dry.      Capillary Refill: Capillary refill takes less than 2 seconds.   Neurological:      Mental Status: He is alert.   Psychiatric:         Mood and Affect: Mood normal.         Laboratory Studies:    Laboratory studies personally reviewed  Lab Results   Component Value Date    SODIUM 139 11/04/2024    K 5.0 11/04/2024     11/04/2024    CO2 25 11/04/2024    BUN 30 (H) 11/04/2024    CREATININE 0.88 11/04/2024    GLUC 108 09/30/2024    CALCIUM 9.3 11/04/2024     Lab Results   Component Value Date    WBC 9.41 11/04/2024    HGB 11.4 (L) 11/04/2024    HCT 37.0 11/04/2024    MCV 91 11/04/2024     11/04/2024       Cardiac testing:     EKG reviewed personally:   EKG reviewed 10/3/2024: Sinus rhythm with biphasic T wave in V3, T wave flattening in lateral precordial leads.      Echocardiogram:  TTE 9/24/24. EF 45%.  Mild global hypokinesis with regional variation.  Abnormal septal motion consistent with postoperative status.  RV systolic function mildly reduced.  Mild LAE.  Mild to moderate AI.  Mild MR.  Mild to moderate pulmonic valve regurgitation.  Aortic root mildly dilated.  Aortic root is 4.1 cm.STJ is mildly dilated at 4.2 cm.  TTE 11/4/2024 [facility]: EF 50 to 54%, low normal.  Indeterminate diastolic function, RV mild dilatation with normal function.  Aortic root 4.4 cm.  PASP 26.      Triny Pisano MD    Portions of the record may have been created with voice recognition software.  Occasional wrong word or \"sound a like\" substitutions may have occurred due to the inherent limitations of voice recognition " software.  Read the chart carefully and recognize, using context, where substitutions have occurred.

## 2024-11-08 NOTE — ASSESSMENT & PLAN NOTE
underwent S/P ascending aorta and full arch replacement, AV resuspension (Bentall procedure, ascending aortic arch repair with Gelweave graft, total arch replacement, descending aortic repair with a stent, right axillary cannulation, circulatory arrest.  ADM 9/19 - 9/24/2024.  Imaging done on 10/17/2024 which shows normal postoperative changes.  Plan to follow-up yearly with CT surgery for repeat echocardiogram and CTA of chest.  Patient's blood pressure well-controlled, currently undergoing cardiac rehab, advised on exercise/exertion limitations given aortic disease.

## 2024-11-12 ENCOUNTER — CLINICAL SUPPORT (OUTPATIENT)
Dept: CARDIAC REHAB | Age: 59
End: 2024-11-12
Payer: COMMERCIAL

## 2024-11-12 DIAGNOSIS — Z98.890 S/P AORTIC DISSECTION REPAIR: Primary | ICD-10-CM

## 2024-11-12 PROCEDURE — 93798 PHYS/QHP OP CAR RHAB W/ECG: CPT

## 2024-11-13 ENCOUNTER — TELEPHONE (OUTPATIENT)
Age: 59
End: 2024-11-13

## 2024-11-13 NOTE — TELEPHONE ENCOUNTER
Received a call from Santos looking to find out what is going on with his [employer] disability form.  He advised that pcp advised cardiology needs to fill out. He dropped off the form    Please c/b [phone]

## 2024-11-13 NOTE — TELEPHONE ENCOUNTER
Patient called regarding the a disability form that he has been trying to have filled out for about a month. Patient was advised by Sheldon Moreira MD that Cardiologist should be filling out the form. The form was improperly filled out by the cardiology office and lost. Patient was alaso advised by Cardiology that the form should be completed by his Primary Care Provider. Patient would like to know if he could drop the form off for Dr Moreira to complete.     Please advise     # [phone]

## 2024-11-14 ENCOUNTER — APPOINTMENT (OUTPATIENT)
Dept: CARDIAC REHAB | Age: 59
End: 2024-11-14
Payer: COMMERCIAL

## 2024-11-14 NOTE — TELEPHONE ENCOUNTER
He can drop off the form for me to complete    38 yo F w/PMH dCHF (EF 55% 9/2017), on home O2 4L during day and bipap at night, MANUEL, morbid obesity, HTN, DMII, presented with increasing SOB on minimal exertion progressing over the past month in the setting of medication non-compliance, admitted for decompensated diastolic HFpEF.  She received Lasix IV and was restarted on her home meds with significant improvement.  Echocardiogram revealed a stable moderate-sized pericardial effusion.  She is medically stable for discharge with close outpatient follow-up. 40 yo F w/PMH dCHF (EF 55% 9/2017), on home O2 4L during day and bipap at night, MANUEL, morbid obesity, HTN, DMII, presented with increasing SOB on minimal exertion progressing over the past month in the setting of medication non-compliance, admitted for decompensated diastolic HFpEF.  She received Lasix IV and was restarted on her home meds with significant improvement.  Echocardiogram revealed a stable moderate to large-sized pericardial effusion.  LE dopplers and d-dimer negative. She is medically stable for discharge with close outpatient follow-up.

## 2024-11-15 NOTE — TELEPHONE ENCOUNTER
Patient dropped the forms off in the office and was told that there will be a $15 forms fee.  Patient stated that he will pay when the forms are completed and he picks them up.  Forms scanned into patients chart and placed on Dr. Moreira's desk to complete.

## 2024-11-19 ENCOUNTER — TELEPHONE (OUTPATIENT)
Dept: INTERNAL MEDICINE CLINIC | Facility: OTHER | Age: 59
End: 2024-11-19

## 2024-11-19 ENCOUNTER — CLINICAL SUPPORT (OUTPATIENT)
Dept: CARDIAC REHAB | Age: 59
End: 2024-11-19
Payer: COMMERCIAL

## 2024-11-19 DIAGNOSIS — Z98.890 S/P AORTIC DISSECTION REPAIR: Primary | ICD-10-CM

## 2024-11-19 PROCEDURE — 93798 PHYS/QHP OP CAR RHAB W/ECG: CPT

## 2024-11-19 NOTE — TELEPHONE ENCOUNTER
Patient called to see if form was completed yet.  Did say we will call him once ready for .    Thank you

## 2024-11-19 NOTE — TELEPHONE ENCOUNTER
Message left for patient to return call to discuss FMLA paperwork. 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

## 2024-11-21 ENCOUNTER — APPOINTMENT (OUTPATIENT)
Dept: CARDIAC REHAB | Age: 59
End: 2024-11-21
Payer: COMMERCIAL

## 2024-11-21 NOTE — TELEPHONE ENCOUNTER
Form has been completed.  LMOM notifying patient that forms are ready to be picked up and that there will be a $15 forms fee.  Fee has been entered into patients chart and completed forms have been scanned into chart.

## 2024-11-26 ENCOUNTER — DOCUMENTATION (OUTPATIENT)
Dept: CARDIOLOGY CLINIC | Facility: CLINIC | Age: 59
End: 2024-11-26

## 2024-11-26 ENCOUNTER — APPOINTMENT (OUTPATIENT)
Dept: CARDIAC REHAB | Age: 59
End: 2024-11-26
Payer: COMMERCIAL

## 2024-12-03 ENCOUNTER — CLINICAL SUPPORT (OUTPATIENT)
Dept: CARDIAC REHAB | Age: 59
End: 2024-12-03
Payer: COMMERCIAL

## 2024-12-03 DIAGNOSIS — Z98.890 S/P AORTIC DISSECTION REPAIR: Primary | ICD-10-CM

## 2024-12-03 PROCEDURE — 93798 PHYS/QHP OP CAR RHAB W/ECG: CPT

## 2024-12-03 NOTE — PROGRESS NOTES
CARDIAC REHABILITATION   ASSESSMENT AND INDIVIDUALIZED TREATMENT PLAN  30 DAY          Today's date: 12/3/2024   # of Exercise Sessions Completed: 7  Patient name: Santos Sandoval      : 1965  Age: 59 y.o.       MRN: [id number]  Referring Physician: Dr. Menchaca  Cardiologist: Dr. Pisano  Provider: Arden  Clinician: Maricruz Prajapati MS, CEP        Treatment is tailored to this patient's individual needs.  The ITP was reviewed with the patient and all questions were answered to their satisfaction.  Additional ITP documentation can be found electronically including daily and monthly exercise summaries, daily session notes with ECG summaries, education notes, daily medication reconciliation, and daily physician supervision.      Comments: 12/3: Pt attended 3 sessions since last progress note. He attends 1x/wk. Encouraged he complete home exercise another 2x per week. He is making healthy dietary choices but struggles with portion/serving size. Recviewed recommendations. He is tolerating increasing workload progression and is being introduced to more challenging modalities. He is abstaining from smoking. He does c/o of intermittent incisional pain, especially in the morning after a night of sleep. Will continue to monitor.     : Santos completed 4 sessions to date. He is working on increasing his endurance and durations prior to increasing workloads. He states he tends to want to push himself outside of rehab. Reiterated the importance of taking it easy at this time.         Dx:   Encounter Diagnosis   Name Primary?    S/P aortic dissection repair Yes       Description of Diagnosis: presented to [facility] ED with severe chest pain radiating down into his abdomen and legs. The pain started suddenly this afternoon when he was getting out of the shower. He felt like he pulled a muscle. The pain was associated with diaphoresis and SOB. He has not been taking his BP meds. He is a 40 pack year smoker. Type A  aortic dissection. Caroline.  Date of onset: 9/19  Other Cardiac History: none prior.        ASSESSMENT    Medical History:   Past Medical History:   Diagnosis Date    Colon polyps     Hypertension        Family History:  Family History   Family history unknown: Yes       Allergies:   Patient has no known allergies.    Current Medications:   Current Outpatient Medications   Medication Sig Dispense Refill    acetaminophen (TYLENOL) 325 mg tablet Take 2 tablets (650 mg total) by mouth every 6 (six) hours as needed for mild pain, headaches or fever (Patient taking differently: Take 650 mg by mouth every 6 (six) hours as needed for mild pain, headaches or fever pt taking 500mg (2) daily PRN)      amLODIPine-valsartan (EXFORGE) 5-160 MG per tablet Take 1 tablet by mouth daily 30 tablet 3    aspirin 325 mg tablet Take 1 tablet (325 mg total) by mouth daily 30 tablet 3    atorvastatin (LIPITOR) 20 mg tablet Take 1 tablet (20 mg total) by mouth daily with dinner 30 tablet 3    docusate sodium (COLACE) 100 mg capsule Take 1 capsule (100 mg total) by mouth 2 (two) times a day as needed for constipation (Patient not taking: Reported on 11/8/2024) 60 capsule 1    ferrous gluconate (FERGON) 240 (27 FE) MG tablet Take 1 tablet (240 mg total) by mouth daily with breakfast (Patient not taking: Reported on 10/24/2024) 90 tablet 0    metoprolol tartrate (LOPRESSOR) 100 mg tablet Take 1 tablet (100 mg total) by mouth every 12 (twelve) hours 60 tablet 3    polyethylene glycol (MIRALAX) 17 g packet Take 17 g by mouth daily as needed (constipation) (Patient not taking: Reported on 11/8/2024) 30 each 1     No current facility-administered medications for this visit.       Medication compliance: Yes   Comments: Pt reports to be compliant with medications    Physical Limitations: lifting restrictions     Fall Risk: Moderate   Comments: Reports a fall in the past 6 months    Cultural needs: none      CAD Risk Factors:  Cholesterol: Yes  HTN:  Yes  DM: No  Obesity: No   Inactivity: Yes      EXERCISE ASSESSMENT:     Initial Fitness Assessment: Submaximal TM ETT:  Resting:  BP: 140/74  HR: 67, Exercise:  BP: 148/74  HR: 87, METs:  3.1, ECG Summary: NSR w/ TWI, and Test terminated at:  RPE 6      ECG INTERPRETATION:  NSR    Current Functional Status:  Occupation: full time job [employer]  Recreation/Physical Activity: none  ADL’s:Capable of performing light ADLs only  Railroad: Capable of performing light ADLs only limited by lifting restrictions  Home exercise:  home PT  Other Comments:       SMART Exercise Goals:   increased exercise capacity by 40% based on peak METs tolerated in cardiac rehab exercise session  maintain > 150 minutes per week of moderate intensity exercise    Patient Specific EXERCISE GOALS:       Build strength   Increase endurance and stamina     Functional Capacity Screening Tool:  [facility] Activity Status Index:  5.29 METs      PSYCHOSOCIAL ASSESSMENT:    Date of last Assessment:  10/10  Depression screening:  PHQ-9 = 5    Interpretation:  5-9 = Mild Depression  Anxiety screening:  ZULMA-7 = 1    Interpretation: 0-4  = Not anxious    Pt self-report of depression and anxiety   Patient reports they are coping well with good social support and denies depression or anxiety    Self-reported stress level:  4   Stressors:  can't do what he is used to doing. Taking things slow right now  Stress Management Tools: exercise and spend time with family    SMART Psychosocial Goals:     PHQ-9 - reduced severity by one level and feel less tired with more energy    Patient Specific PSYCHOCOSOCIAL GOALS:    Improved energy with activity    Quality of Life Screen:  (Higher score indicates disease impact on QOL)  [facility] COOP score: 26/45     Social Support:   significant other and siblings  Community/Social Activities:      Psychosocial Assessment as it relates to rehabilitation:   Patient denies issues with his/her family or home life that may affect their  "rehabilitation efforts.       NUTRITION ASSESSMENT:    Initial Weight:  169  Current Weight:     Height:   Ht Readings from Last 1 Encounters:   11/08/24 5' 11\" (1.803 m)       Rate Your Plate Score: 59/81      Lipid management: Discussed diet and lipid management and Last lipid profile 6/24/23  Chol 161  TRG 68  HDL 47      Current Dietary Habits:  Low salt  Has switched from whole milk to 2%  Working on reducing processed foods      SMART Nutrition Goals:   Improved Rate Your Plate score  >64, eat 5 or more servings of fruits and vegetables a day, eat 2 or more servings of low fat milk or yogurt a day, dine out less than once per week or choose low fat restaurant meals, rarely eat processed meats or eat low fat processed meats, choose 1% or skim milk, rarely eat cheese or choose reduced fat or skim, rarely eat frozen desserts or choose fruit or fat-free sweets, choose healthy snacks such as fruit, pretzels, and low fat crackers, choose healthy desserts and sweets such as kt food cake or  fruit, rarely/never eat salty snacks, and choose low sugar desserts and sweets    Patient Specific NUTRITION GOALS:     1. Improve diet   2. Portion sizes    3. Learn serving sizes    Drug/Alcohol Use:   Rarely drinks.       OTHER CORE COMPONENT ASSESSMENT:    Tobacco Use:     Pt quit within the past year and has abstained    Anginal Symptoms:  SOB, excessive diaphoresis, and severe pain   NTG use: No prescription    SMART Goals:   consistent, controlled resting BP < 130/80, medication compliance, and abstain from smoking    Patient Specific CORE COMPONENT GOALS:    Smoking abstention   Medication compliance       INDIVIDUALIZED TREATMENT PLAN      EXERCISE GOALS and PLAN      Progress toward Exercise goals:   Pt is progressing and showing improvement  toward the following goals:  Building strength, increasing stamina and endurance, improved URIBE, progressing workloads in CR .  , Pt has not made progress toward the " following goals: enc home exercise 2x/week days opposite CR. , Will continue to educate and progress as tolerated.    Exercise Intervention/plan:    education on home exercise guidelines and home exercise 30+ mins 2 days opposite CR    The patient was counseled on exercise guidelines to achieve a minimum of 150 mins/wk of moderate intensity (RPE 4-6)   exercise and encouraged to add 1-2 days of exercise on opposite days of cardiac rehab as tolerated.       PHYSICIAN PRESCRIBED EXERCISE:    Current Aerobic Exercise Prescription:      Frequency: 3 days/week   Supplement with home exercise 2+ days/wk as tolerated       Minutes: 40         METS: 2.8-3.8           HR: RHR +30-40bpm   RPE: 4-6         Modalities: Treadmill, Airdyne bike, UBE, NuStep, and Recumbent bike     Exercise workloads will be progressed gradually as tolerated, within limits of patient's ability, and according to the patient's   response to the exercise program.      Aerobic Exercise Prescription Plan for Progression   Frequency: 3 days/week of cardiac rehab       Supplement with home exercise 2+ days/wk as tolerated    Minutes: 35-40      >150 mins/wk of moderate intensity exercise   METS: 2.9-4.5   HR: Intensity based on RPE 4-6      RPE: 4-6   Modalities: Treadmill, Airdyne bike, UBE, Lifecycle, Rower, and Recumbent bike    Strength training:  Will be added following at least 8 weeks post surgery and 8-10 monitored sessions   Modalities: Leg Press, Chest Press, Pull Downs, and Lateral Raise    Home Exercise: Type: walking, Frequency: 2-3 days/week    Exercise Education: benefit of exercise for CAD risk factors, RPE scale, and Group class: Risk Factors for Heart Disease     Readiness to change: Action:  (Changing behavior)      NUTRITION GOALS AND PLAN      Nutritional   Reviewed patient's Rate your Plate. Discussed key elements of heart healthy eating. Reviewed patient goals for dietary modifications and their clinical implications.  Reviewed  most recent lipid profile.     Patient's progress toward Nutrition goals:    Pt is progressing and showing improvement  toward the following goals:  switched to 1% milk, trying to practice portion control, cut out lunch meat and processed meats, consuming less sodium, switched to no added sugar juice, reviewed serving sizes, less processed foods.  , Pt has not made progress toward the following goals: water intake - drinking mainly milk and mixed juices, occ overeating the portions..       Nutrition Intervention/plan:   increase intake of whole grains, replace refined flours with whole grains, increase daily intake of fruits and vegetables, reduce intake and /or  rarely eat processed meats , drink/use 1%  low fat or skim  milk, eat reduced-fat or part-skim cheese or rarely eat, rarely eat frozen desserts, eat healthy snacks like fruit, pretzels, and low fat crackers, choose desserts such as fruit, kt food cake, low-fat or fat-free sweets, rarely/never eat salty snacks, choose low sugar desserts and sweets, and drink more water    Measurable goals were based Rate Your Plate Dietary Self-Assessment. These are the areas in which the patient could score higher on the assessment.  Goals include recommendations for a heart healthy diet based on American Heart Association.    Nutrition Education:   heart healthy eating principles  low sodium diet  maintaining hydration  nutrition for  lipid management  portion control  group class:  Label Reading    Readiness to change: Preparation:  (Getting ready to change)  and Action:  (Changing behavior)      PSYCHOSOCIAL GOALS AND PLAN    Psychosocial Assessment as it relates to rehabilitation:   Patient denies issues with his/her family or home life that may affect their rehabilitation efforts.     Patient's progress toward Psychosocial goals:    Pt is progressing and showing improvement  toward the following goals:  pt denies symptoms of depression or anxiety.      Psychosocial  Intervention/plan:   Exercise and Enjoy family    Psychosocial Education: benefits of a positive support system, class:  Relaxation, and class:  Stress and Your Health     Information to utilize [employer] was provided as well as contact information for counseling through  Behavioral Health and group psychotherapy groups available.    Readiness to change: Maintenance: (Maintaining the behavior change)      OTHER CORE COMPONENTS GOALS and PLAN      Blood Pressure will be monitored throughout the program and cardiologist will be notified of elevated trends.    Pt will be encouraged to monitor home BP if advised by cardiologist.    Tobacco Intervention/plan:   Pt quit 9/2024 and has abstained since quitting.      Progress toward Core Component goals:   Goals met: smoking cessation and medication compliance.    Other Core Components Intervention:   medication compliance, complete abstention from smoking, engage in regular exercise, check labels for sodium content, and monitor home BP    Group and Individual Education:  understanding high blood pressure and it's relationship to CAD, components of blood pressure management, effects of nicotine and tobacco, and group class: Understanding Heart Disease    Readiness to change: Maintenance: (Maintaining the behavior change)

## 2024-12-05 ENCOUNTER — APPOINTMENT (OUTPATIENT)
Dept: CARDIAC REHAB | Age: 59
End: 2024-12-05
Payer: COMMERCIAL

## 2024-12-10 ENCOUNTER — APPOINTMENT (OUTPATIENT)
Dept: CARDIAC REHAB | Age: 59
End: 2024-12-10
Payer: COMMERCIAL

## 2024-12-12 ENCOUNTER — APPOINTMENT (OUTPATIENT)
Dept: CARDIAC REHAB | Age: 59
End: 2024-12-12
Payer: COMMERCIAL

## 2024-12-17 ENCOUNTER — CLINICAL SUPPORT (OUTPATIENT)
Dept: CARDIAC REHAB | Age: 59
End: 2024-12-17
Payer: COMMERCIAL

## 2024-12-17 ENCOUNTER — APPOINTMENT (OUTPATIENT)
Dept: CARDIAC REHAB | Age: 59
End: 2024-12-17
Payer: COMMERCIAL

## 2024-12-17 DIAGNOSIS — Z98.890 S/P AORTIC DISSECTION REPAIR: Primary | ICD-10-CM

## 2024-12-17 PROCEDURE — 93798 PHYS/QHP OP CAR RHAB W/ECG: CPT

## 2024-12-19 ENCOUNTER — APPOINTMENT (OUTPATIENT)
Dept: CARDIAC REHAB | Age: 59
End: 2024-12-19
Payer: COMMERCIAL

## 2024-12-24 ENCOUNTER — APPOINTMENT (OUTPATIENT)
Dept: CARDIAC REHAB | Age: 59
End: 2024-12-24
Payer: COMMERCIAL

## 2024-12-26 ENCOUNTER — APPOINTMENT (OUTPATIENT)
Dept: CARDIAC REHAB | Age: 59
End: 2024-12-26
Payer: COMMERCIAL

## 2024-12-31 ENCOUNTER — CLINICAL SUPPORT (OUTPATIENT)
Dept: CARDIAC REHAB | Age: 59
End: 2024-12-31
Payer: COMMERCIAL

## 2024-12-31 DIAGNOSIS — Z98.890 S/P AORTIC DISSECTION REPAIR: Primary | ICD-10-CM

## 2024-12-31 PROCEDURE — 93798 PHYS/QHP OP CAR RHAB W/ECG: CPT

## 2024-12-31 NOTE — PROGRESS NOTES
CARDIAC REHABILITATION   ASSESSMENT AND INDIVIDUALIZED TREATMENT PLAN  60 DAY          Today's date: 2024   # of Exercise Sessions Completed: 9  Patient name: Santos Sandoval      : 1965  Age: 59 y.o.       MRN: [id number]  Referring Physician: Dr. Menchaca  Cardiologist: Dr. Pisano  Provider: [place]  Clinician: Maricruz Prajapati MS, CEP        Treatment is tailored to this patient's individual needs.  The ITP was reviewed with the patient and all questions were answered to their satisfaction.  Additional ITP documentation can be found electronically including daily and monthly exercise summaries, daily session notes with ECG summaries, education notes, daily medication reconciliation, and daily physician supervision.      Comments: : Pt has only attended 1 session since last ITP was completed. He is slowly progressing his workloads. He is not supplementing with regular home exercise although has been encouraged to. Occ c/o of L knee discomfort. He remains smoke free. His 325mg of acetaminophen was discontinued. He is struggling with healthy diet choices. Denies cardiac complaints. He was introduced to strength training. Will continue to monitor.     12/3: Pt attended 3 sessions since last progress note. He attends 1x/wk. Encouraged he complete home exercise another 2x per week. He is making healthy dietary choices but struggles with portion/serving size. Recviewed recommendations. He is tolerating increasing workload progression and is being introduced to more challenging modalities. He is abstaining from smoking. He does c/o of intermittent incisional pain, especially in the morning after a night of sleep. Will continue to monitor.     : Santos completed 4 sessions to date. He is working on increasing his endurance and durations prior to increasing workloads. He states he tends to want to push himself outside of rehab. Reiterated the importance of taking it easy at this time.         Dx:    Encounter Diagnosis   Name Primary?    S/P aortic dissection repair Yes       Description of Diagnosis: presented to [facility] ED with severe chest pain radiating down into his abdomen and legs. The pain started suddenly this afternoon when he was getting out of the shower. He felt like he pulled a muscle. The pain was associated with diaphoresis and SOB. He has not been taking his BP meds. He is a 40 pack year smoker. Type A aortic dissection. [place].  Date of onset: 9/19  Other Cardiac History: none prior.        ASSESSMENT    Medical History:   Past Medical History:   Diagnosis Date    Colon polyps     Hypertension        Family History:  Family History   Family history unknown: Yes       Allergies:   Patient has no known allergies.    Current Medications:   Current Outpatient Medications   Medication Sig Dispense Refill    acetaminophen (TYLENOL) 325 mg tablet Take 2 tablets (650 mg total) by mouth every 6 (six) hours as needed for mild pain, headaches or fever (Patient taking differently: Take 650 mg by mouth every 6 (six) hours as needed for mild pain, headaches or fever pt taking 500mg (2) daily PRN)      amLODIPine-valsartan (EXFORGE) 5-160 MG per tablet Take 1 tablet by mouth daily 30 tablet 3    aspirin 325 mg tablet Take 1 tablet (325 mg total) by mouth daily 30 tablet 3    atorvastatin (LIPITOR) 20 mg tablet Take 1 tablet (20 mg total) by mouth daily with dinner 30 tablet 3    docusate sodium (COLACE) 100 mg capsule Take 1 capsule (100 mg total) by mouth 2 (two) times a day as needed for constipation (Patient not taking: Reported on 11/8/2024) 60 capsule 1    ferrous gluconate (FERGON) 240 (27 FE) MG tablet Take 1 tablet (240 mg total) by mouth daily with breakfast (Patient not taking: Reported on 10/24/2024) 90 tablet 0    metoprolol tartrate (LOPRESSOR) 100 mg tablet Take 1 tablet (100 mg total) by mouth every 12 (twelve) hours 60 tablet 3    polyethylene glycol (MIRALAX) 17 g packet Take 17 g  by mouth daily as needed (constipation) (Patient not taking: Reported on 11/8/2024) 30 each 1     No current facility-administered medications for this visit.       Medication compliance: Yes   Comments: Pt reports to be compliant with medications    Physical Limitations: lifting restrictions     Fall Risk: Moderate   Comments: Reports a fall in the past 6 months    Cultural needs: none      CAD Risk Factors:  Cholesterol: Yes  HTN: Yes  DM: No  Obesity: No   Inactivity: Yes      EXERCISE ASSESSMENT:     Initial Fitness Assessment: Submaximal TM ETT:  Resting:  BP: 140/74  HR: 67, Exercise:  BP: 148/74  HR: 87, METs:  3.1, ECG Summary: NSR w/ TWI, and Test terminated at:  RPE 6      ECG INTERPRETATION:  NSR    Current Functional Status:  Occupation: full time job [employer]  Recreation/Physical Activity: none  ADL’s:Capable of performing light ADLs only  [place]: Capable of performing light ADLs only limited by lifting restrictions  Home exercise:  home PT  Other Comments:       SMART Exercise Goals:   increased exercise capacity by 40% based on peak METs tolerated in cardiac rehab exercise session  maintain > 150 minutes per week of moderate intensity exercise    Patient Specific EXERCISE GOALS:       Build strength   Increase endurance and stamina     Functional Capacity Screening Tool:  [facility] Activity Status Index:  5.29 METs      PSYCHOSOCIAL ASSESSMENT:    Date of last Assessment:  10/10  Depression screening:  PHQ-9 = 5    Interpretation:  5-9 = Mild Depression  Anxiety screening:  ZULMA-7 = 1    Interpretation: 0-4  = Not anxious    Pt self-report of depression and anxiety   Patient reports they are coping well with good social support and denies depression or anxiety    Self-reported stress level:  4   Stressors:  can't do what he is used to doing. Taking things slow right now  Stress Management Tools: exercise and spend time with family    SMART Psychosocial Goals:     PHQ-9 - reduced severity by one level and  "feel less tired with more energy    Patient Specific PSYCHOCOSOCIAL GOALS:    Improved energy with activity    Quality of Life Screen:  (Higher score indicates disease impact on QOL)  [facility] COOP score: 26/45     Social Support:   significant other and siblings  Community/Social Activities:      Psychosocial Assessment as it relates to rehabilitation:   Patient denies issues with his/her family or home life that may affect their rehabilitation efforts.       NUTRITION ASSESSMENT:    Initial Weight:  169  Current Weight:     Height:   Ht Readings from Last 1 Encounters:   11/08/24 5' 11\" (1.803 m)       Rate Your Plate Score: 59/81      Lipid management: Discussed diet and lipid management and Last lipid profile 6/24/23  Chol 161  TRG 68  HDL 47      Current Dietary Habits:  Low salt  Has switched from whole milk to 2%  Working on reducing processed foods      SMART Nutrition Goals:   Improved Rate Your Plate score  >64, eat 5 or more servings of fruits and vegetables a day, eat 2 or more servings of low fat milk or yogurt a day, dine out less than once per week or choose low fat restaurant meals, rarely eat processed meats or eat low fat processed meats, choose 1% or skim milk, rarely eat cheese or choose reduced fat or skim, rarely eat frozen desserts or choose fruit or fat-free sweets, choose healthy snacks such as fruit, pretzels, and low fat crackers, choose healthy desserts and sweets such as kt food cake or  fruit, rarely/never eat salty snacks, and choose low sugar desserts and sweets    Patient Specific NUTRITION GOALS:     1. Improve diet   2. Portion sizes    3. Learn serving sizes    Drug/Alcohol Use:   Rarely drinks.       OTHER CORE COMPONENT ASSESSMENT:    Tobacco Use:     Pt quit within the past year and has abstained    Anginal Symptoms:  SOB, excessive diaphoresis, and severe pain   NTG use: No prescription    SMART Goals:   consistent, controlled resting BP < 130/80, medication " compliance, and abstain from smoking    Patient Specific CORE COMPONENT GOALS:    Smoking abstention   Medication compliance       INDIVIDUALIZED TREATMENT PLAN      EXERCISE GOALS and PLAN      Progress toward Exercise goals:   Pt is progressing and showing improvement  toward the following goals:  Building strength, increasing stamina and endurance, improved URIBE, progressing workloads in CR, intro to strength training .  , Pt has not made progress toward the following goals: enc home exercise 2x/week days opposite CR. , Will continue to educate and progress as tolerated.    Exercise Intervention/plan:    education on home exercise guidelines and home exercise 30+ mins 2 days opposite CR    The patient was counseled on exercise guidelines to achieve a minimum of 150 mins/wk of moderate intensity (RPE 4-6)   exercise and encouraged to add 1-2 days of exercise on opposite days of cardiac rehab as tolerated.       PHYSICIAN PRESCRIBED EXERCISE:    Current Aerobic Exercise Prescription:      Frequency: 3 days/week   Supplement with home exercise 2+ days/wk as tolerated       Minutes: 40         METS: 2.8-3.8           HR: RHR +30-40bpm   RPE: 4-6         Modalities: Treadmill, Airdyne bike, UBE, NuStep, and Recumbent bike     Exercise workloads will be progressed gradually as tolerated, within limits of patient's ability, and according to the patient's   response to the exercise program.      Aerobic Exercise Prescription Plan for Progression   Frequency: 3 days/week of cardiac rehab       Supplement with home exercise 2+ days/wk as tolerated    Minutes: 35-40      >150 mins/wk of moderate intensity exercise   METS: 2.9-4.5   HR: Intensity based on RPE 4-6      RPE: 4-6   Modalities: Treadmill, Airdyne bike, UBE, Lifecycle, Rower, and Recumbent bike    Strength training:  Will be added following at least 8 weeks post surgery and 8-10 monitored sessions   Modalities: Leg Press, Chest Press, Pull Downs, Lateral Raise,  "and Overhead Press    Home Exercise: Type: walking, Frequency: 2-3 days/week    Exercise Education: benefit of exercise for CAD risk factors, RPE scale, and Group class: Risk Factors for Heart Disease     Readiness to change: Contemplation:  (Acknowledging that there is a problem but not yet ready or sure of wanting to make a change) and Action:  (Changing behavior)      NUTRITION GOALS AND PLAN      Nutritional   Reviewed patient's Rate your Plate. Discussed key elements of heart healthy eating. Reviewed patient goals for dietary modifications and their clinical implications.  Reviewed most recent lipid profile.     Patient's progress toward Nutrition goals:    Pt is progressing and showing improvement  toward the following goals:  switched to 1% milk, trying to practice portion control, cut out lunch meat and processed meats, consuming less sodium, switched to no added sugar juice, reviewed serving sizes, less processed foods.  , Pt has not made progress toward the following goals: water intake - drinking mainly milk and mixed juices, occ overeating the portions, struggling with HHD, eating \"junk\"..       Nutrition Intervention/plan:   increase intake of whole grains, replace refined flours with whole grains, increase daily intake of fruits and vegetables, reduce intake and /or  rarely eat processed meats , drink/use 1%  low fat or skim  milk, eat reduced-fat or part-skim cheese or rarely eat, rarely eat frozen desserts, eat healthy snacks like fruit, pretzels, and low fat crackers, choose desserts such as fruit, kt food cake, low-fat or fat-free sweets, rarely/never eat salty snacks, choose low sugar desserts and sweets, and drink more water    Measurable goals were based Rate Your Plate Dietary Self-Assessment. These are the areas in which the patient could score higher on the assessment.  Goals include recommendations for a heart healthy diet based on American Heart Association.    Nutrition Education: " "  heart healthy eating principles  low sodium diet  maintaining hydration  nutrition for  lipid management  portion control  group class:  Label Reading    Readiness to change: Preparation:  (Getting ready to change)  and Action:  (Changing behavior)      PSYCHOSOCIAL GOALS AND PLAN    Psychosocial Assessment as it relates to rehabilitation:   Patient denies issues with his/her family or home life that may affect their rehabilitation efforts.     Patient's progress toward Psychosocial goals:    Pt is progressing and showing improvement  toward the following goals:  pt denies symptoms of depression or anxiety.  , Pt has not made progress toward the following goals: Pt slow to progress, attending 1x/wk and rarely supplements with home exercise. Has made comments before about \"not wanting to get better.\" We continue to explain the benefits of improved physical health and lifestyle for increased longevity..     Psychosocial Intervention/plan:   Exercise and Enjoy family Understand the importance of wanting to heal and physically improve.     Psychosocial Education: benefits of a positive support system, class:  Relaxation, and class:  Stress and Your Health     Information to utilize [employer] was provided as well as contact information for counseling through  Behavioral Health and group psychotherapy groups available.    Readiness to change: Maintenance: (Maintaining the behavior change)      OTHER CORE COMPONENTS GOALS and PLAN      Blood Pressure will be monitored throughout the program and cardiologist will be notified of elevated trends.    Pt will be encouraged to monitor home BP if advised by cardiologist.    Tobacco Intervention/plan:   Pt quit 9/2024 and has abstained since quitting.      Progress toward Core Component goals:   Goals met: smoking cessation and medication compliance.    Other Core Components Intervention:   medication compliance, complete abstention from smoking, engage in regular exercise, " check labels for sodium content, and monitor home BP    Group and Individual Education:  understanding high blood pressure and it's relationship to CAD, components of blood pressure management, effects of nicotine and tobacco, and group class: Understanding Heart Disease    Readiness to change: Maintenance: (Maintaining the behavior change)

## 2025-01-07 ENCOUNTER — OFFICE VISIT (OUTPATIENT)
Dept: INTERNAL MEDICINE CLINIC | Facility: OTHER | Age: 60
End: 2025-01-07
Payer: COMMERCIAL

## 2025-01-07 VITALS
TEMPERATURE: 98.2 F | BODY MASS INDEX: 25.34 KG/M2 | OXYGEN SATURATION: 98 % | WEIGHT: 181 LBS | DIASTOLIC BLOOD PRESSURE: 60 MMHG | SYSTOLIC BLOOD PRESSURE: 108 MMHG | HEART RATE: 64 BPM | HEIGHT: 71 IN

## 2025-01-07 DIAGNOSIS — I71.010 TYPE 1 DISSECTION OF ASCENDING AORTA (HCC): ICD-10-CM

## 2025-01-07 DIAGNOSIS — G89.18 POSTOPERATIVE PAIN: ICD-10-CM

## 2025-01-07 DIAGNOSIS — I10 ESSENTIAL HYPERTENSION: Primary | ICD-10-CM

## 2025-01-07 DIAGNOSIS — E78.5 DYSLIPIDEMIA: ICD-10-CM

## 2025-01-07 DIAGNOSIS — Z11.59 NEED FOR HEPATITIS C SCREENING TEST: ICD-10-CM

## 2025-01-07 DIAGNOSIS — Z12.5 PROSTATE CANCER SCREENING: ICD-10-CM

## 2025-01-07 DIAGNOSIS — Z12.11 ENCOUNTER FOR SCREENING FOR MALIGNANT NEOPLASM OF COLON: ICD-10-CM

## 2025-01-07 PROBLEM — D62 ACUTE BLOOD LOSS AS CAUSE OF POSTOPERATIVE ANEMIA: Status: RESOLVED | Noted: 2024-09-20 | Resolved: 2025-01-07

## 2025-01-07 PROBLEM — T40.2X5A OPIOID-INDUCED CONSTIPATION: Status: RESOLVED | Noted: 2024-10-01 | Resolved: 2025-01-07

## 2025-01-07 PROBLEM — K59.03 OPIOID-INDUCED CONSTIPATION: Status: RESOLVED | Noted: 2024-10-01 | Resolved: 2025-01-07

## 2025-01-07 PROCEDURE — 99214 OFFICE O/P EST MOD 30 MIN: CPT | Performed by: INTERNAL MEDICINE

## 2025-01-07 NOTE — ASSESSMENT & PLAN NOTE
Stable, continue to monitor clinically.  Continue current medication regimen.    Orders:  •  CBC; Future  •  Comprehensive metabolic panel; Future  •  Lipid panel; Future

## 2025-01-07 NOTE — ASSESSMENT & PLAN NOTE
Continue atorvastatin. Continue to trend lipid panel.   Orders:  •  CBC; Future  •  Comprehensive metabolic panel; Future  •  Lipid panel; Future

## 2025-01-07 NOTE — PROGRESS NOTES
Name: Santos Sandoval      : 1965      MRN: [id number]  Encounter Provider: Sheldon Moreira MD  Encounter Date: 2025   Encounter department: [facility]  :  Assessment & Plan  Encounter for screening for malignant neoplasm of colon    Orders:  •  Ambulatory Referral to Gastroenterology; Future    Type 1 dissection of ascending aorta (HCC)  Stable, continue to monitor clinically.  Continue current medication regimen.    Orders:  •  CBC; Future  •  Comprehensive metabolic panel; Future  •  Lipid panel; Future    Essential hypertension  Continue current antihypertensive regimen.    Orders:  •  CBC; Future  •  Comprehensive metabolic panel; Future  •  Lipid panel; Future    Dyslipidemia  Continue atorvastatin. Continue to trend lipid panel.   Orders:  •  CBC; Future  •  Comprehensive metabolic panel; Future  •  Lipid panel; Future    Need for hepatitis C screening test    Orders:  •  Hepatitis C Antibody; Future    Prostate cancer screening    Orders:  •  PSA, Total Screen; Future    Postoperative chest pain  Recommended he start alpha lipoic acid and vitamin supplementation.                History of Present Illness     Santos Sandoval is seen today for follow up of chronic conditions.   Recent laboratory studies reviewed today with the patient.   he has been compliant with his medication regimen.   He continues to have intermittent diffuse chest pain that he feels is a complications from when he had open heart surgery. Pain occurs twice a week. He has been taking tylenol for the pain.   he has no complaints or concerns at this time.     Hypertension  This is a chronic problem. The current episode started more than 1 year ago. The problem is unchanged. The problem is controlled. Pertinent negatives include no chest pain, headaches, palpitations or shortness of breath. Past treatments include angiotensin blockers, calcium channel blockers and beta blockers. The current  "treatment provides moderate improvement. There are no compliance problems.    Hyperlipidemia  This is a chronic problem. The current episode started more than 1 year ago. The problem is controlled. Recent lipid tests were reviewed and are normal. Pertinent negatives include no chest pain or shortness of breath. Current antihyperlipidemic treatment includes statins. The current treatment provides moderate improvement of lipids. There are no compliance problems.      Review of Systems   Constitutional:  Negative for activity change, appetite change, chills, diaphoresis, fatigue and fever.   HENT:  Negative for congestion, postnasal drip, rhinorrhea, sinus pressure, sinus pain, sneezing and sore throat.    Eyes:  Negative for visual disturbance.   Respiratory:  Negative for apnea, cough, choking, chest tightness, shortness of breath and wheezing.    Cardiovascular:  Negative for chest pain, palpitations and leg swelling.   Gastrointestinal:  Negative for abdominal distention, abdominal pain, anal bleeding, blood in stool, constipation, diarrhea, nausea and vomiting.   Endocrine: Negative for cold intolerance and heat intolerance.   Genitourinary:  Negative for difficulty urinating, dysuria and hematuria.   Musculoskeletal: Negative.    Skin: Negative.    Neurological:  Negative for dizziness, weakness, light-headedness, numbness and headaches.   Hematological:  Negative for adenopathy.   Psychiatric/Behavioral:  Negative for agitation, sleep disturbance and suicidal ideas.    All other systems reviewed and are negative.      Objective   /60 (BP Location: Left arm, Patient Position: Sitting, Cuff Size: Adult)   Pulse 64   Temp 98.2 °F (36.8 °C)   Ht 5' 11\" (1.803 m)   Wt 82.1 kg (181 lb)   SpO2 98%   BMI 25.24 kg/m²      Physical Exam  Vitals and nursing note reviewed.   Constitutional:       General: He is not in acute distress.     Appearance: He is well-developed. He is not diaphoretic.   HENT:      Head: " Normocephalic and atraumatic.   Eyes:      General: No scleral icterus.        Right eye: No discharge.         Left eye: No discharge.      Conjunctiva/sclera: Conjunctivae normal.      Pupils: Pupils are equal, round, and reactive to light.   Neck:      Thyroid: No thyromegaly.      Vascular: No JVD.   Cardiovascular:      Rate and Rhythm: Normal rate and regular rhythm.      Heart sounds: Normal heart sounds. No murmur heard.     No friction rub. No gallop.   Pulmonary:      Effort: Pulmonary effort is normal. No respiratory distress.      Breath sounds: Normal breath sounds. No wheezing or rales.   Chest:      Chest wall: No tenderness.   Abdominal:      General: Bowel sounds are normal. There is no distension.      Palpations: Abdomen is soft. There is no mass.      Tenderness: There is no abdominal tenderness. There is no guarding or rebound.   Musculoskeletal:         General: No tenderness or deformity. Normal range of motion.      Cervical back: Normal range of motion and neck supple.   Lymphadenopathy:      Cervical: No cervical adenopathy.   Skin:     General: Skin is warm and dry.      Coloration: Skin is not pale.      Findings: No erythema or rash.   Neurological:      Mental Status: He is alert and oriented to person, place, and time.      Cranial Nerves: No cranial nerve deficit.      Coordination: Coordination normal.      Deep Tendon Reflexes: Reflexes are normal and symmetric.   Psychiatric:         Behavior: Behavior normal.         Thought Content: Thought content normal.         Judgment: Judgment normal.

## 2025-01-07 NOTE — ASSESSMENT & PLAN NOTE
Continue current antihypertensive regimen.    Orders:  •  CBC; Future  •  Comprehensive metabolic panel; Future  •  Lipid panel; Future

## 2025-01-09 ENCOUNTER — TELEPHONE (OUTPATIENT)
Age: 60
End: 2025-01-09

## 2025-01-09 NOTE — TELEPHONE ENCOUNTER
Hello. Our mutual patient would like to return to work on this Monday, January 13, 2025. He has been partaking in cardiac rehab. Any reservations or objections to him returning to work? Seems like he has been progressing well with rehab.

## 2025-01-09 NOTE — TELEPHONE ENCOUNTER
Pt is requesting a return to work letter per his employer's request as pt would like to start back on Monday.    Please contact to advise when ready for pickup

## 2025-01-09 NOTE — TELEPHONE ENCOUNTER
Spoke with patient, patient is wanting to return to work on 1/13/2025. Returning to work after Type 1 dissection of ascending aorta with repair. Please see all fmla forms are in chart from pcp and cardiology.     Thank you

## 2025-01-27 DIAGNOSIS — Z98.890 S/P AORTIC DISSECTION REPAIR: ICD-10-CM

## 2025-01-27 DIAGNOSIS — I10 ESSENTIAL HYPERTENSION: ICD-10-CM

## 2025-01-27 DIAGNOSIS — I10 ACCELERATED HYPERTENSION: ICD-10-CM

## 2025-01-27 RX ORDER — ATORVASTATIN CALCIUM 20 MG/1
20 TABLET, FILM COATED ORAL
Qty: 30 TABLET | Refills: 5 | Status: SHIPPED | OUTPATIENT
Start: 2025-01-27

## 2025-01-27 RX ORDER — AMLODIPINE AND VALSARTAN 5; 160 MG/1; MG/1
1 TABLET ORAL DAILY
Qty: 30 TABLET | Refills: 5 | Status: SHIPPED | OUTPATIENT
Start: 2025-01-27

## 2025-01-27 RX ORDER — METOPROLOL TARTRATE 100 MG/1
100 TABLET ORAL EVERY 12 HOURS SCHEDULED
Qty: 60 TABLET | Refills: 5 | Status: SHIPPED | OUTPATIENT
Start: 2025-01-27

## 2025-01-27 NOTE — TELEPHONE ENCOUNTER
Reason for call:   [x] Refill   [] Prior Auth  [] Other:     Office:   [] PCP/Provider -   [x] Specialty/Provider - Cardio     Medication:     metoprolol tartrate (LOPRESSOR) 100 mg tablet   atorvastatin (LIPITOR) 20 mg tablet       amLODIPine-valsartan (EXFORGE) 5-160 MG per tablet     Dose/Frequency:     100 mg, Oral, Every 12 hours scheduled   20 mg, Oral, Daily with dinner     1 tablet, Oral, Daily     Quantity:   60  30  30    Pharmacy: [address]     Does the patient have enough for 3 days?   [] Yes   [x] No - Send as HP to POD

## 2025-02-27 ENCOUNTER — OFFICE VISIT (OUTPATIENT)
Dept: CARDIOLOGY CLINIC | Facility: CLINIC | Age: 60
End: 2025-02-27
Payer: COMMERCIAL

## 2025-02-27 VITALS
HEART RATE: 44 BPM | BODY MASS INDEX: 25.06 KG/M2 | WEIGHT: 179 LBS | OXYGEN SATURATION: 100 % | HEIGHT: 71 IN | DIASTOLIC BLOOD PRESSURE: 64 MMHG | SYSTOLIC BLOOD PRESSURE: 104 MMHG

## 2025-02-27 DIAGNOSIS — I10 ESSENTIAL HYPERTENSION: ICD-10-CM

## 2025-02-27 DIAGNOSIS — Z98.890 S/P AORTIC DISSECTION REPAIR: Primary | ICD-10-CM

## 2025-02-27 PROCEDURE — 99214 OFFICE O/P EST MOD 30 MIN: CPT | Performed by: STUDENT IN AN ORGANIZED HEALTH CARE EDUCATION/TRAINING PROGRAM

## 2025-02-27 RX ORDER — METOPROLOL SUCCINATE 100 MG/1
100 TABLET, EXTENDED RELEASE ORAL DAILY
Qty: 30 TABLET | Refills: 3 | Status: SHIPPED | OUTPATIENT
Start: 2025-02-27

## 2025-02-27 NOTE — PATIENT INSTRUCTIONS
1)STOP  metoprolol tartrate twice a day,  and start  Metoprolol succinate 100 mg once daily then check blood pressure/heart rate once daily for one week  2) 1 week for blood pressure check, 6 months visit with me

## 2025-02-27 NOTE — PROGRESS NOTES
[facility] Cardiology  Follow up note  Santos Sandoval 59 y.o. male MRN: [id number]        1. S/P aortic dissection repair  -     metoprolol succinate (TOPROL-XL) 100 mg 24 hr tablet; Take 1 tablet (100 mg total) by mouth daily  2. Essential hypertension      Assessment & Plan  S/P aortic dissection repair  With patient reports that his continue to not smoke since the surgery, does report that he does feel generally fatigued but improved from 3 months ago.  Had to stop cardiac rehab due to his work schedule but is planning to reestablish with our rehab clinic.  He has been noting that he has been feeling a bit fatigued and his heart rate today was in the 40s, we will decrease his metoprolol and switch him to long-acting formulation.  He will follow-up in 1 week with BP/heart rate check in addition with checking his numbers at home to make sure his machine is well calibrated.  He will follow-up with CV surgery 1 year postop for repeat imaging.  He was educated on diet, lifestyle, and exercise restrictions.  Essential hypertension  Blood pressure currently well-controlled with patient bradycardic, will reduce dose of metoprolol and switch to long-acting formulation.  Will follow-up in 1 week for BP check given history of AAA.  Can repeat lipid panel with next set of labs to make sure his LDL stays at goal.        HPI:   Santos Sandoval is a 59 y.o. year old maleith surgical repair of a type I dissection September 2024, history of hypertension, former smoker. S/P ascending aorta and full arch replacement, AV resuspension (Bentall procedure, ascending aortic arch repair with Gelweave graft, total arch replacement, descending aortic repair with a stent, right axillary cannulation, circulatory arrest.  ADM 9/19 - 9/24/2024.  Imaging done on 10/17/2024 which shows normal postoperative changes.  Plan to follow-up yearly with CT surgery for repeat echocardiogram and CTA of chest     Currently denies any fever, chills, new  "visual changes, lightheadedness, syncope, chest pain, palpitations, shortness of breath at rest or with exertion, orthopnea, PND, nausea, vomiting, diarrhea, dark or bright red blood in stools, lower extremity swelling, leg claudication.     The patient does report that he has been having some fatigue which has not improved, reports that after \"warming up\" the symptoms are improved.  Of note he was bradycardic to the 40s during this admission with a relatively low blood pressure, will decrease his metoprolol and reassess his symptoms.        Review of Systems    Past Medical History:   Diagnosis Date    Colon polyps     Hypertension      Social History     Substance and Sexual Activity   Alcohol Use Yes    Alcohol/week: 3.0 standard drinks of alcohol    Types: 3 Cans of beer per week    Comment: weekly     Social History     Substance and Sexual Activity   Drug Use Yes    Types: Marijuana     Social History     Tobacco Use   Smoking Status Former    Average packs/day: 0.5 packs/day for 45.7 years (22.9 ttl pk-yrs)    Types: Cigarettes    Start date: 1/1/1979   Smokeless Tobacco Never   Tobacco Comments    daily       Allergies:  No Known Allergies    Medications:     Current Outpatient Medications:     acetaminophen (TYLENOL) 325 mg tablet, Take 2 tablets (650 mg total) by mouth every 6 (six) hours as needed for mild pain, headaches or fever (Patient taking differently: Take 650 mg by mouth every 6 (six) hours as needed for mild pain, headaches or fever As needed), Disp: , Rfl:     amLODIPine-valsartan (EXFORGE) 5-160 MG per tablet, Take 1 tablet by mouth daily, Disp: 30 tablet, Rfl: 5    atorvastatin (LIPITOR) 20 mg tablet, Take 1 tablet (20 mg total) by mouth daily with dinner, Disp: 30 tablet, Rfl: 5    metoprolol succinate (TOPROL-XL) 100 mg 24 hr tablet, Take 1 tablet (100 mg total) by mouth daily, Disp: 30 tablet, Rfl: 3      Vitals:    02/27/25 1418   BP: 104/64   Pulse: (!) 44   SpO2: 100%     Weight (last 2 " "days)       Date/Time Weight    02/27/25 1418 81.2 (179)          Physical Exam  Vitals and nursing note reviewed.   Constitutional:       General: He is not in acute distress.     Appearance: He is well-developed.   HENT:      Head: Normocephalic and atraumatic.   Eyes:      Conjunctiva/sclera: Conjunctivae normal.   Cardiovascular:      Rate and Rhythm: Regular rhythm. Bradycardia present.      Heart sounds: No murmur heard.     Comments: Surgical scar present on anterior chest  Pulmonary:      Effort: Pulmonary effort is normal. No respiratory distress.      Breath sounds: Normal breath sounds.   Abdominal:      Palpations: Abdomen is soft.      Tenderness: There is no abdominal tenderness.   Musculoskeletal:         General: No swelling.      Cervical back: Neck supple.   Skin:     General: Skin is warm and dry.      Capillary Refill: Capillary refill takes less than 2 seconds.   Neurological:      Mental Status: He is alert.   Psychiatric:         Mood and Affect: Mood normal.         Laboratory Studies:    Laboratory studies personally reviewed    Cardiac testing:     See above     Triny Pisano MD    Portions of the record may have been created with voice recognition software.  Occasional wrong word or \"sound a like\" substitutions may have occurred due to the inherent limitations of voice recognition software.  Read the chart carefully and recognize, using context, where substitutions have occurred.   "

## 2025-02-27 NOTE — ASSESSMENT & PLAN NOTE
With patient reports that his continue to not smoke since the surgery, does report that he does feel generally fatigued but improved from 3 months ago.  Had to stop cardiac rehab due to his work schedule but is planning to reestablish with our rehab clinic.  He has been noting that he has been feeling a bit fatigued and his heart rate today was in the 40s, we will decrease his metoprolol and switch him to long-acting formulation.  He will follow-up in 1 week with BP/heart rate check in addition with checking his numbers at home to make sure his machine is well calibrated.  He will follow-up with CV surgery 1 year postop for repeat imaging.  He was educated on diet, lifestyle, and exercise restrictions.

## 2025-02-27 NOTE — ASSESSMENT & PLAN NOTE
Blood pressure currently well-controlled with patient bradycardic, will reduce dose of metoprolol and switch to long-acting formulation.  Will follow-up in 1 week for BP check given history of AAA.  Can repeat lipid panel with next set of labs to make sure his LDL stays at goal.

## 2025-03-25 ENCOUNTER — CLINICAL SUPPORT (OUTPATIENT)
Dept: CARDIOLOGY CLINIC | Facility: CLINIC | Age: 60
End: 2025-03-25

## 2025-03-25 VITALS — SYSTOLIC BLOOD PRESSURE: 110 MMHG | OXYGEN SATURATION: 98 % | DIASTOLIC BLOOD PRESSURE: 68 MMHG | HEART RATE: 48 BPM

## 2025-03-25 DIAGNOSIS — Z98.890 S/P AORTIC DISSECTION REPAIR: ICD-10-CM

## 2025-03-25 DIAGNOSIS — I10 ESSENTIAL HYPERTENSION: Primary | ICD-10-CM

## 2025-03-25 RX ORDER — METOPROLOL SUCCINATE 50 MG/1
50 TABLET, EXTENDED RELEASE ORAL DAILY
Qty: 90 TABLET | Refills: 3 | Status: SHIPPED | OUTPATIENT
Start: 2025-03-25

## 2025-03-25 NOTE — PROGRESS NOTES
BP check per Dr. Pisano. At last OV pt was to stop metoprolol tartrate 100 mg BID, and to start metoprolol succinate 100 mg q day.     Allergies and Medications reviewed. Per pt, he has returned back to work on light duty but is still having the same symptoms of sob and fatigue, but will be retiring from his job soon. Pt also brought in BP log, pt used an automatic cuff on left forearm. (Will scan into chart).    Vitals today, BP: 110/68     P:48    Reviewed vitals with today's reading doctor, Dr. Weems. Was advised to first get an EKG to r/o pvc's. EKG was performed and reviewed with Dr. Weems, EKG shown sinus bradycardia with LVH. Dr. Weems contacted Dr. Pisano via [employer] secure chat in regards to lowering the metoprolol. Per epic secure chat, Dr. Pisano recommended Santos to decrease his metoprolol to 50 mg daily and follow up in another week or two with a BP check.     Will contact Santos to advise on dose change and set up BP check.

## 2025-03-25 NOTE — TELEPHONE ENCOUNTER
Spoke with Santos and relayed message as given per Dr. Pisano per [employer] secure chat based on today's NV, (decrease his metoprolol to 50 mg daily and follow up in another week or two with a BP check.) Will send updated script to the [facility] pharmacy in [place] and will contact scheduling to set up NV BP check.    Santos is aware and understood.

## 2025-03-25 NOTE — TELEPHONE ENCOUNTER
BP log scanned and attached to this encounter, pt used an automatic cuff for the forearm (used left forearm). Dates from 3/12/25-3/24/25.

## 2025-04-23 ENCOUNTER — TELEPHONE (OUTPATIENT)
Dept: CARDIOLOGY CLINIC | Facility: CLINIC | Age: 60
End: 2025-04-23

## 2025-05-08 ENCOUNTER — TELEPHONE (OUTPATIENT)
Dept: CARDIOLOGY CLINIC | Facility: CLINIC | Age: 60
End: 2025-05-08

## 2025-05-08 NOTE — TELEPHONE ENCOUNTER
Left message for patient to call back and reschedule 7/18 appointment due to provider no longer being in office

## 2025-05-16 ENCOUNTER — TELEPHONE (OUTPATIENT)
Age: 60
End: 2025-05-16

## 2025-05-16 NOTE — TELEPHONE ENCOUNTER
Caller: Santos (patient)    Doctor/Office: Dr Pisano / [facility]    Call regarding :  Patient called to reschedule local office nurse visit today 5/16/25     Call was transferred to: Jess ([facility] [employer])

## 2025-05-20 ENCOUNTER — CLINICAL SUPPORT (OUTPATIENT)
Dept: CARDIOLOGY CLINIC | Facility: CLINIC | Age: 60
End: 2025-05-20
Payer: COMMERCIAL

## 2025-05-20 VITALS
OXYGEN SATURATION: 98 % | HEART RATE: 67 BPM | WEIGHT: 164.8 LBS | BODY MASS INDEX: 23.07 KG/M2 | HEIGHT: 71 IN | DIASTOLIC BLOOD PRESSURE: 70 MMHG | SYSTOLIC BLOOD PRESSURE: 126 MMHG

## 2025-05-20 DIAGNOSIS — Z98.890 S/P AORTIC DISSECTION REPAIR: Primary | ICD-10-CM

## 2025-05-20 PROCEDURE — 99211 OFF/OP EST MAY X REQ PHY/QHP: CPT

## 2025-05-20 NOTE — PROGRESS NOTES
Patient here for B/P check. Patient has no cardiac complaints. Dr. Rodriguez looked over vitals. B/P today was 126/70, pulse 67. Patient brought log in of home B/p's using wrist cuff.

## 2025-05-30 NOTE — ASSESSMENT & PLAN NOTE
-Reports longstanding history of hypertension for which he was noncompliant with medical management  -Currently on lisinopril and metoprolol as mentioned  -BP elevated today, CT surgery adding Norvasc   PDMP reviewed; no aberrant behavior identified, prescription authorized.

## 2025-07-14 DIAGNOSIS — I10 ACCELERATED HYPERTENSION: ICD-10-CM

## 2025-07-14 DIAGNOSIS — Z98.890 S/P AORTIC DISSECTION REPAIR: ICD-10-CM

## 2025-07-15 RX ORDER — ATORVASTATIN CALCIUM 20 MG/1
20 TABLET, FILM COATED ORAL
Qty: 30 TABLET | Refills: 2 | Status: SHIPPED | OUTPATIENT
Start: 2025-07-15

## 2025-07-15 RX ORDER — AMLODIPINE AND VALSARTAN 5; 160 MG/1; MG/1
1 TABLET ORAL DAILY
Qty: 30 TABLET | Refills: 2 | Status: SHIPPED | OUTPATIENT
Start: 2025-07-15

## 2025-08-20 ENCOUNTER — OFFICE VISIT (OUTPATIENT)
Dept: CARDIOLOGY CLINIC | Facility: CLINIC | Age: 60
End: 2025-08-20
Payer: COMMERCIAL

## 2025-08-20 VITALS
HEART RATE: 54 BPM | DIASTOLIC BLOOD PRESSURE: 68 MMHG | OXYGEN SATURATION: 97 % | HEIGHT: 71 IN | WEIGHT: 170.1 LBS | SYSTOLIC BLOOD PRESSURE: 120 MMHG | BODY MASS INDEX: 23.81 KG/M2

## 2025-08-20 DIAGNOSIS — Z98.890 S/P AORTIC DISSECTION REPAIR: Primary | ICD-10-CM

## 2025-08-20 DIAGNOSIS — E78.5 DYSLIPIDEMIA: ICD-10-CM

## 2025-08-20 DIAGNOSIS — I10 ESSENTIAL HYPERTENSION: ICD-10-CM

## 2025-08-20 PROCEDURE — 99214 OFFICE O/P EST MOD 30 MIN: CPT | Performed by: NURSE PRACTITIONER

## (undated) DEVICE — PUMP TUBING FUSION PACK

## (undated) DEVICE — GLOVE SRG BIOGEL ECLIPSE 7.5

## (undated) DEVICE — 32 FR RIGHT ANGLE – SOFT PVC CATHETER: Brand: PVC THORACIC CATHETERS

## (undated) DEVICE — BONE WAX WHITE: Brand: BONE WAX WHITE

## (undated) DEVICE — GLOVE INDICATOR PI UNDERGLOVE SZ 8 BLUE

## (undated) DEVICE — SUTURE GUIDE

## (undated) DEVICE — SUT PROLENE 4-0 SH 36 IN 8521H

## (undated) DEVICE — SILVER-COATED ANTIBACTERIAL BARRIER DRESSING: Brand: ACTICOAT SURGIC 10X12CM 5PK US

## (undated) DEVICE — PACK CUSTOM PERFUSION ANH

## (undated) DEVICE — PLUMEPEN PRO 10FT

## (undated) DEVICE — INTENDED FOR TISSUE SEPARATION, AND OTHER PROCEDURES THAT REQUIRE A SHARP SURGICAL BLADE TO PUNCTURE OR CUT.: Brand: BARD-PARKER ® CARBON RIB-BACK BLADES

## (undated) DEVICE — CATH URET 16FR RED RUBBER

## (undated) DEVICE — SUT PROLENE 4-0 RB-1/RB-1 36 IN 8557H

## (undated) DEVICE — AIRLIFE™ TRI-FLO™ SUCTION CATHETER: Brand: AIRLIFE™

## (undated) DEVICE — EVERGRIP INSERT SET 86MM: Brand: FOGARTY EVERGRIP

## (undated) DEVICE — 32 FR STRAIGHT – SOFT PVC CATHETER: Brand: PVC THORACIC CATHETERS

## (undated) DEVICE — SUT MONOCRYL PLUS 3-0 PS-2 27 IN MCP427H

## (undated) DEVICE — GAUZE SPONGES,16 PLY: Brand: CURITY

## (undated) DEVICE — LIGHT HANDLE COVER SLEEVE DISP BLUE STELLAR

## (undated) DEVICE — DRESSING ALLEVYN LIFE HEEL 25 X 25.2CM

## (undated) DEVICE — VESSEL LOOPS X-RAY DETECTABLE: Brand: DEROYAL

## (undated) DEVICE — Device

## (undated) DEVICE — SUT PDS PLUS 1 CTB 36 IN PDPB359T

## (undated) DEVICE — TRAY FOLEY 16FR SURESTEP TEMP SENS URIMETER STAT LOK

## (undated) DEVICE — THERMOFLECT BLANKET, L, 25EA                               TS THERMOFLECT BLANKET, 48" X 84", SILVER, 5/BG, 5 BG/CS NW: Brand: THERMOFLECT

## (undated) DEVICE — SUT SILK 0 CT-1 30 IN 424H

## (undated) DEVICE — PACK VALVE PBDS

## (undated) DEVICE — SUT PROLENE 4-0 D-SPECIAL CUSTOM KIT D7160

## (undated) DEVICE — UMBILICAL TAPE: Brand: DEROYAL

## (undated) DEVICE — DRESSING ALLEVYN LIFE SACRAL 6.75 X 6.5 IN

## (undated) DEVICE — CATH BAL OCCLUSION CODA-2-10.0-35-140-46

## (undated) DEVICE — SUT PROLENE 5-0 C-1/C-1 36 IN 8321H

## (undated) DEVICE — SUT ETHIBOND 2-0 SH-1/SH-1 30 IN X763H

## (undated) DEVICE — PACK CUSTOM PERFUSION PLEG PK

## (undated) DEVICE — STERNAL WIRE

## (undated) DEVICE — FILTER SMOKE EVAC VIROSAFE

## (undated) DEVICE — SURGICEL 4 X 8IN

## (undated) DEVICE — LIGACLIP MCA MULTIPLE CLIP APPLIERS, 20 SMALL CLIPS: Brand: LIGACLIP

## (undated) DEVICE — EVERGRIP INSERT SET 61MM: Brand: FOGARTY EVERGRIP

## (undated) DEVICE — BLANKET HYPOTHERMIA ADULT GAYMAR

## (undated) DEVICE — SUT SILK 2 60 IN SA8H

## (undated) DEVICE — 3000CC GUARDIAN II: Brand: GUARDIAN

## (undated) DEVICE — SILVER-COATED ANTIBACTERIAL BARRIER DRESSING: Brand: ACTICOAT SURGIC 10X25CM 5PK US

## (undated) DEVICE — OASIS DRAIN, SINGLE, INLINE & ATS COMPATIBLE: Brand: OASIS

## (undated) DEVICE — RECIP.STERNUM SAW BLADE 34/7.5/0.7MM: Brand: AESCULAP

## (undated) DEVICE — 40601 PROLONGED POSITIONING SYSTEM: Brand: 40601 PROLONGED POSITIONING SYSTEM

## (undated) DEVICE — ELECTRODE BLADE E-Z CLEAN 2.75IN 7CM -0012A